# Patient Record
Sex: FEMALE | Race: WHITE | NOT HISPANIC OR LATINO | Employment: OTHER | URBAN - METROPOLITAN AREA
[De-identification: names, ages, dates, MRNs, and addresses within clinical notes are randomized per-mention and may not be internally consistent; named-entity substitution may affect disease eponyms.]

---

## 2017-01-05 ENCOUNTER — HOSPITAL ENCOUNTER (OUTPATIENT)
Facility: MEDICAL CENTER | Age: 62
End: 2017-01-05
Attending: PHYSICIAN ASSISTANT
Payer: COMMERCIAL

## 2017-01-05 PROCEDURE — 87624 HPV HI-RISK TYP POOLED RSLT: CPT

## 2017-01-05 PROCEDURE — 88175 CYTOPATH C/V AUTO FLUID REDO: CPT

## 2017-01-06 LAB
CYTOLOGY REG CYTOL: NORMAL
HPV HR 12 DNA CVX QL NAA+PROBE: NEGATIVE
HPV16 DNA SPEC QL NAA+PROBE: NEGATIVE
HPV18 DNA SPEC QL NAA+PROBE: NEGATIVE
SPECIMEN SOURCE: NORMAL

## 2017-02-08 ENCOUNTER — HOSPITAL ENCOUNTER (OUTPATIENT)
Dept: LAB | Facility: MEDICAL CENTER | Age: 62
End: 2017-02-08
Attending: INTERNAL MEDICINE
Payer: COMMERCIAL

## 2017-02-08 LAB
T4 FREE SERPL-MCNC: 0.8 NG/DL (ref 0.53–1.43)
TSH SERPL DL<=0.005 MIU/L-ACNC: 0.79 UIU/ML (ref 0.3–3.7)

## 2017-02-08 PROCEDURE — 36415 COLL VENOUS BLD VENIPUNCTURE: CPT

## 2017-02-08 PROCEDURE — 84439 ASSAY OF FREE THYROXINE: CPT

## 2017-02-08 PROCEDURE — 84443 ASSAY THYROID STIM HORMONE: CPT

## 2017-02-09 ENCOUNTER — HOSPITAL ENCOUNTER (OUTPATIENT)
Dept: RADIOLOGY | Facility: MEDICAL CENTER | Age: 62
End: 2017-02-09
Attending: FAMILY MEDICINE
Payer: COMMERCIAL

## 2017-02-09 DIAGNOSIS — N92.0 EXCESSIVE OR FREQUENT MENSTRUATION: ICD-10-CM

## 2017-02-09 DIAGNOSIS — N92.6 IRREGULAR MENSTRUAL CYCLE: ICD-10-CM

## 2017-02-09 PROCEDURE — 76830 TRANSVAGINAL US NON-OB: CPT

## 2017-02-21 ENCOUNTER — OFFICE VISIT (OUTPATIENT)
Dept: MEDICAL GROUP | Facility: PHYSICIAN GROUP | Age: 62
End: 2017-02-21
Payer: COMMERCIAL

## 2017-02-21 VITALS
HEART RATE: 74 BPM | BODY MASS INDEX: 40.97 KG/M2 | TEMPERATURE: 97.5 F | DIASTOLIC BLOOD PRESSURE: 82 MMHG | SYSTOLIC BLOOD PRESSURE: 130 MMHG | OXYGEN SATURATION: 98 % | HEIGHT: 64 IN | WEIGHT: 240 LBS

## 2017-02-21 DIAGNOSIS — F41.9 ANXIETY: ICD-10-CM

## 2017-02-21 DIAGNOSIS — I10 ESSENTIAL HYPERTENSION: ICD-10-CM

## 2017-02-21 DIAGNOSIS — E04.2 MULTIPLE THYROID NODULES: ICD-10-CM

## 2017-02-21 DIAGNOSIS — E66.01 MORBID OBESITY WITH BMI OF 40.0-44.9, ADULT (HCC): ICD-10-CM

## 2017-02-21 DIAGNOSIS — F40.243 FEAR OF FLYING: ICD-10-CM

## 2017-02-21 PROCEDURE — 99214 OFFICE O/P EST MOD 30 MIN: CPT | Performed by: FAMILY MEDICINE

## 2017-02-21 ASSESSMENT — PATIENT HEALTH QUESTIONNAIRE - PHQ9: CLINICAL INTERPRETATION OF PHQ2 SCORE: 0

## 2017-02-21 NOTE — ASSESSMENT & PLAN NOTE
Ongoing issue. Patient reports that she has a history of fear of flying and has utilized Xanax 0.25 mg in the past. She is not reporting any recent trips for which she will need the medication.

## 2017-02-21 NOTE — MR AVS SNAPSHOT
"        Kiley Foss   2017 11:40 AM   Office Visit   MRN: [id number]    Department:  [facility]   Dept Phone:  [phone]    Description:  Female : 1955   Provider:  Marysol Navarro D.O.           Reason for Visit     Follow-Up           Allergies as of 2017     Allergen Noted Reactions    Augmentin 2011       Feels anxious    Cephalexin 2015   Vomiting    Pcn [Penicillins] 2011       Sulfa Drugs 2011         You were diagnosed with     Fear of flying   [508126]       Anxiety   [028446]       Essential hypertension   [4954386]       Multiple thyroid nodules   [053043]       Morbid obesity with BMI of 40.0-44.9, adult (CMS-[facility])   [901477]         Vital Signs     Blood Pressure Pulse Temperature Height Weight Body Mass Index    130/82 mmHg 74 36.4 °C (97.5 °F) 1.626 m (5' 4.02\") 108.863 kg (240 lb) 41.18 kg/m2    Oxygen Saturation Smoking Status                98% Never Smoker           Basic Information     Date Of Birth Sex Race Ethnicity Preferred Language    1955 Female White Non- English      Your appointments     2017  2:30 PM   US MINGE with [facility]  2   [facility] ([address])    [address] [address] [phone]           Check in 30 minutes prior.            2017  3:00 PM   MA [id number] with [facility]  2   [facility] ([address])    [address] [address] [phone]           No deodorant, powder, perfume or lotion under the arm or breast area.            Aug 22, 2017 11:20 AM   Established Patient with Marysol Navarro D.O.   [facility] ([place])    [address] [place] [id number] [phone]           You will be receiving a confirmation call a few days before your appointment from our automated call confirmation system.              Problem List              ICD-10-CM Priority Class Noted - " Resolved    Anxiety F41.9   Unknown - Present    Hyperlipidemia E78.5   Unknown - Present    Non-seasonal allergic rhinitis J30.89   Unknown - Present    ASTHMA    Unknown - Present    GERD (gastroesophageal reflux disease) K21.9   Unknown - Present    Multiple thyroid nodules E04.2   Unknown - Present    RAD (reactive airway disease) (Chronic) J45.909   7/1/2011 - Present    Routine health maintenance Z00.00   7/1/2011 - Present    Essential hypertension I10   7/10/2015 - Present    Varicose veins of right lower extremity I83.91   7/10/2015 - Present    Obesity, Class III, BMI 40-49.9 (morbid obesity) (CMS-[facility]) E66.01   8/22/2016 - Present    Fear of flying F40.243   2/21/2017 - Present    Morbid obesity with BMI of 40.0-44.9, adult ([facility]) E66.01, Z68.41   2/21/2017 - Present      Health Maintenance        Date Due Completion Dates    IMM DTaP/Tdap/Td Vaccine (1 - Tdap) 10/22/1976 ---    IMM PNEUMOCOCCAL 19-64 (ADULT) MEDIUM RISK SERIES (1 of 1 - PPSV23) 10/22/1976 ---    IMM INFLUENZA (1) 9/1/2016 10/9/2015, 9/15/2014    MAMMOGRAM 2/12/2017 2/12/2016, 8/14/2014, 8/1/2014, 8/1/2014, 8/1/2014, 8/1/2014, 8/1/2014    PAP SMEAR 1/5/2020 1/5/2017, 8/14/2012    COLONOSCOPY 11/12/2020 11/12/2015            Current Immunizations     Influenza TIV (IM) 10/9/2015, 9/15/2014      Below and/or attached are the medications your provider expects you to take. Review all of your home medications and newly ordered medications with your provider and/or pharmacist. Follow medication instructions as directed by your provider and/or pharmacist. Please keep your medication list with you and share with your provider. Update the information when medications are discontinued, doses are changed, or new medications (including over-the-counter products) are added; and carry medication information at all times in the event of emergency situations     Allergies:  AUGMENTIN - (reactions not documented)     CEPHALEXIN - Vomiting     PCN -  (reactions not documented)     SULFA DRUGS - (reactions not documented)               Medications  Valid as of: February 21, 2017 - 12:09 PM    Generic Name Brand Name Tablet Size Instructions for use    Albuterol Sulfate (Aero Soln) albuterol 108 (90 BASE) MCG/ACT Inhale 2 Puffs by mouth every 6 hours as needed.        ALPRAZolam (Tab) XANAX 0.25 MG Take 1 Tab by mouth 1 time daily as needed for Anxiety (with flying). Indications: prn flying        Beclomethasone Dipropionate (Aero Soln) QVAR 40 MCG/ACT Inhale 2 Puffs by mouth 2 Times a Day.        Cetirizine HCl   Take  by mouth.        Cholecalciferol (Tab) cholecalciferol 1000 UNIT Take 1,000 Units by mouth every day.        Cyanocobalamin (Tab) VITAMIN B-12 100 MCG Take 100 mcg by mouth every day.        Fluticasone Propionate (Suspension) FLONASE 50 MCG/ACT SPRAY 2 SPRAYS IN EACH NOSTRILL EVERY DAY        HydroCHLOROthiazide (Cap) MICROZIDE 12.5 MG TAKE 1 CAPSULE BY MOUTH EVERY DAY        Multiple Vitamins-Minerals   Take  by mouth.        Norethindrone Acetate   Take  by mouth.        PredniSONE (Tab) DELTASONE 20 MG Take one tab PO qd x 5d        Progesterone Micronized   Take  by mouth.        Sertraline HCl (Tab) ZOLOFT 50 MG Take 1 Tab by mouth every day.        .                 Medicines prescribed today were sent to:     [facility] [address] [place] [address] - [address] AT [facility]. & [facility]    [address] [address]    Phone: [phone] Fax: [phone]    Open 24 Hours?: No      Medication refill instructions:       If your prescription bottle indicates you have medication refills left, it is not necessary to call your provider’s office. Please contact your pharmacy and they will refill your medication.    If your prescription bottle indicates you do not have any refills left, you may request refills at any time through one of the following ways: The online [employer] system (except Urgent Care), by calling  your provider’s office, or by asking your pharmacy to contact your provider’s office with a refill request. Medication refills are processed only during regular business hours and may not be available until the next business day. Your provider may request additional information or to have a follow-up visit with you prior to refilling your medication.   *Please Note: Medication refills are assigned a new Rx number when refilled electronically. Your pharmacy may indicate that no refills were authorized even though a new prescription for the same medication is available at the pharmacy. Please request the medicine by name with the pharmacy before contacting your provider for a refill.        Your To Do List     Future Labs/Procedures Complete By Expires    COMP METABOLIC PANEL  8/1/2017 2/22/2018    LIPID PROFILE  8/1/2017 2/22/2018    VITAMIN D,25 HYDROXY  8/1/2017 2/22/2018         "[employer]" Access Code: [email or web address]  Expires: 3/10/2017 10:44 AM    "[employer]"  A secure, online tool to manage your health information     [email or web address]’s "[employer]"® is a secure, online tool that connects you to your personalized health information from the privacy of your home -- day or night - making it very easy for you to manage your healthcare. Once the activation process is completed, you can even access your medical information using the "[employer]" delta, which is available for free in the Apple Delta store or Google Play store.     "[employer]" provides the following levels of access (as shown below):   My Chart Features   [facility] Doctor [facility]  Specialists [facility]  Urgent  Care Non-[facility]  Primary Care  Doctor   Email your healthcare team securely and privately 24/7 X X X    Manage appointments: schedule your next appointment; view details of past/upcoming appointments X      Request prescription refills. X      View recent personal medical records, including lab and immunizations X X X X   View health record, including health  history, allergies, medications X X X X   Read reports about your outpatient visits, procedures, consult and ER notes X X X X   See your discharge summary, which is a recap of your hospital and/or ER visit that includes your diagnosis, lab results, and care plan. X X       How to register for [employer]:  1. Go to  [email or web address].  2. Click on the Sign Up Now box, which takes you to the New Member Sign Up page. You will need to provide the following information:  a. Enter your [employer] Access Code exactly as it appears at the top of this page. (You will not need to use this code after you’ve completed the sign-up process. If you do not sign up before the expiration date, you must request a new code.)   b. Enter your date of birth.   c. Enter your home email address.   d. Click Submit, and follow the next screen’s instructions.  3. Create a [employer] ID. This will be your [employer] login ID and cannot be changed, so think of one that is secure and easy to remember.  4. Create a onkeat password. You can change your password at any time.  5. Enter your Password Reset Question and Answer. This can be used at a later time if you forget your password.   6. Enter your e-mail address. This allows you to receive e-mail notifications when new information is available in [employer].  7. Click Sign Up. You can now view your health information.    For assistance activating your [employer] account, call [phone]

## 2017-02-21 NOTE — PROGRESS NOTES
Subjective:   Kiley Foss is a 61 y.o. female here today for thyroid; obesity; HTN; anxiety    Multiple thyroid nodules  Ongoing issue. Patient reports that she is seeing her endocrinologist. They have evaluated and determined that she has no changes in her thyroid nodules. Chart review shows that her TSH and T4 both well within the normal range. Patient denies any issues such as difficulty swallowing, difficulty breathing, or temperature intolerance.    Morbid obesity with BMI of 40.0-44.9, adult (HCC)  Ongoing issue. Patient reports that over the last 3 months she is not doing very well with eating a healthy diet or getting any regular exercise. She reports that she will be following back up with [employer] and getting restarted on her exercise classes.    Fear of flying  Ongoing issue. Patient reports that she has a history of fear of flying and has utilized Xanax 0.25 mg in the past. She is not reporting any recent trips for which she will need the medication.    Essential hypertension  Ongoing issue. Patient reports 100% compliance with hydrochlorothiazide 12.5 mg daily. Patient denies any issues with changes in urinary frequency, headache, dizziness, chest pain. Chart review shows that her blood pressure and heart rate are within normal range.    Anxiety  Ongoing issue. Patient reports 100% compliance with Zoloft 50 mg daily. Patient states the stabilizes her mood and helps her to manage very well. She states that on one occasion she did have some acute issues of anxiety but this was directly related to her mother's death and going to the . Otherwise she does well and denies any suicidal or homicidal ideation.         Current medicines (including changes today)  Current Outpatient Prescriptions   Medication Sig Dispense Refill   • Progesterone Micronized (PROGESTERONE PO) Take  by mouth.     • NORETHINDRONE ACETATE PO Take  by mouth.     • cyanocobalamin (VITAMIN B-12) 100 MCG TABS Take 100  "mcg by mouth every day.     • vitamin D (CHOLECALCIFEROL) 1000 UNIT TABS Take 1,000 Units by mouth every day.     • sertraline (ZOLOFT) 50 MG TABS Take 1 Tab by mouth every day. 90 Tab 3   • Cetirizine HCl (ZYRTEC PO) Take  by mouth.     • Multiple Vitamins-Minerals (WOMENS MULTI VITAMIN & MINERAL PO) Take  by mouth.     • predniSONE (DELTASONE) 20 MG Tab Take one tab PO qd x 5d 5 Tab 0   • albuterol (PROVENTIL HFA) 108 (90 BASE) MCG/ACT Aero Soln inhalation aerosol Inhale 2 Puffs by mouth every 6 hours as needed. 20.1 g 6   • fluticasone (FLONASE) 50 MCG/ACT nasal spray SPRAY 2 SPRAYS IN EACH NOSTRILL EVERY DAY 1 Bottle 3   • hydrochlorothiazide (MICROZIDE) 12.5 MG capsule TAKE 1 CAPSULE BY MOUTH EVERY DAY 90 Cap 3   • alprazolam (XANAX) 0.25 MG TABS Take 1 Tab by mouth 1 time daily as needed for Anxiety (with flying). Indications: prn flying 10 Tab 0   • beclomethasone (QVAR) 40 MCG/ACT inhaler Inhale 2 Puffs by mouth 2 Times a Day. 3 Inhaler 3     No current facility-administered medications for this visit.     She  has a past medical history of Anxiety; Dyslipidemia; Non-seasonal allergic rhinitis; ASTHMA; Simple phobia; GERD (gastroesophageal reflux disease); Multiple thyroid nodules; RAD (reactive airway disease) (7/1/2011); Obesity -- 43 (7/27/2011); Hemorrhage; and Hemorrhoids (11/12/15). She also has no past medical history of Breast cancer (CMS-HCC).    ROS   No chest pain, no shortness of breath, no abdominal pain       Objective:     Blood pressure 130/82, pulse 74, temperature 36.4 °C (97.5 °F), height 1.626 m (5' 4.02\"), weight 108.863 kg (240 lb), SpO2 98 %. Body mass index is 41.18 kg/(m^2).   Physical Exam:  Alert, oriented in no acute distress.  Eye contact is good, speech goal directed, affect calm  HEENT: conjunctiva non-injected, sclera non-icteric.  Pinna normal. TM pearly gray.   Oral mucous membranes pink and moist with no lesions.  Neck No adenopathy or masses in the neck or supraclavicular " regions.  Lungs: clear to auscultation bilaterally with good excursion.  CV: regular rate and rhythm.  Abdomen: soft, nontender, No CVAT; obese  Ext: no edema, color normal, vascularity normal, temperature normal  Psych :normal affect and mood      Assessment and Plan:   The following treatment plan was discussed     1. Morbid obesity with BMI of 40.0-44.9, adult (CMS-[facility])  Patient identified as having weight management issue.  Appropriate orders and counseling given.    Uncontrolled. Encouraged patient to restart Weight Watchers and get back on her regular exercise program. Monitor   2. Essential hypertension  COMP METABOLIC PANEL    LIPID PROFILE    VITAMIN D,25 HYDROXY    Stable. Continue current medication; monitor   3. Multiple thyroid nodules      Stable. Continue follow-up with endocrinology. Monitor   4. Anxiety      Stable. Continue current medications; monitor   5. Fear of flying      Stable. If needed, would provide prescription Xanax 0.25 mg 5 tablets for travel. Monitor       Followup: Return in about 6 months (around 8/21/2017) for HTN/lipids/labs, Short.

## 2017-02-21 NOTE — ASSESSMENT & PLAN NOTE
Ongoing issue. Patient reports that she is seeing her endocrinologist. They have evaluated and determined that she has no changes in her thyroid nodules. Chart review shows that her TSH and T4 both well within the normal range. Patient denies any issues such as difficulty swallowing, difficulty breathing, or temperature intolerance.

## 2017-02-21 NOTE — ASSESSMENT & PLAN NOTE
Ongoing issue. Patient reports 100% compliance with Zoloft 50 mg daily. Patient states the stabilizes her mood and helps her to manage very well. She states that on one occasion she did have some acute issues of anxiety but this was directly related to her mother's death and going to the . Otherwise she does well and denies any suicidal or homicidal ideation.

## 2017-02-21 NOTE — ASSESSMENT & PLAN NOTE
Ongoing issue. Patient reports 100% compliance with hydrochlorothiazide 12.5 mg daily. Patient denies any issues with changes in urinary frequency, headache, dizziness, chest pain. Chart review shows that her blood pressure and heart rate are within normal range.

## 2017-02-21 NOTE — ASSESSMENT & PLAN NOTE
Ongoing issue. Patient reports that over the last 3 months she is not doing very well with eating a healthy diet or getting any regular exercise. She reports that she will be following back up with [employer] and getting restarted on her exercise classes.

## 2017-04-24 RX ORDER — ALBUTEROL SULFATE 2.5 MG/3ML
2.5 SOLUTION RESPIRATORY (INHALATION) EVERY 4 HOURS PRN
Qty: 75 ML | Refills: 0 | Status: SHIPPED | OUTPATIENT
Start: 2017-04-24 | End: 2017-09-19

## 2017-04-24 RX ORDER — ALBUTEROL SULFATE 2.5 MG/3ML
SOLUTION RESPIRATORY (INHALATION)
Qty: 75 ML | Refills: 0 | OUTPATIENT
Start: 2017-04-24

## 2017-04-25 ENCOUNTER — TELEPHONE (OUTPATIENT)
Dept: MEDICAL GROUP | Facility: PHYSICIAN GROUP | Age: 62
End: 2017-04-25

## 2017-04-25 ENCOUNTER — HOSPITAL ENCOUNTER (OUTPATIENT)
Dept: RADIOLOGY | Facility: MEDICAL CENTER | Age: 62
End: 2017-04-25
Attending: FAMILY MEDICINE
Payer: COMMERCIAL

## 2017-04-25 DIAGNOSIS — R92.8 ABNORMAL MAMMOGRAM OF LEFT BREAST: ICD-10-CM

## 2017-04-25 DIAGNOSIS — R92.30 DENSE BREAST TISSUE: ICD-10-CM

## 2017-04-25 DIAGNOSIS — Z13.9 SCREENING: ICD-10-CM

## 2017-04-25 PROCEDURE — 76641 ULTRASOUND BREAST COMPLETE: CPT

## 2017-04-25 PROCEDURE — 77063 BREAST TOMOSYNTHESIS BI: CPT

## 2017-04-25 NOTE — TELEPHONE ENCOUNTER
----- Message from Marysol Navarro D.O. sent at 4/25/2017 12:43 PM PDT -----  Please advise pt that the mammogram was normal but the ultrasound did show a small area in the left breast which needs further evaluation. I have ordered those tests; please give patient phone number for scheduling.    Marysol Navarro D.O.

## 2017-05-31 ENCOUNTER — HOSPITAL ENCOUNTER (OUTPATIENT)
Dept: RADIOLOGY | Facility: MEDICAL CENTER | Age: 62
End: 2017-05-31
Attending: FAMILY MEDICINE
Payer: COMMERCIAL

## 2017-05-31 DIAGNOSIS — R92.8 ABNORMAL MAMMOGRAM OF LEFT BREAST: ICD-10-CM

## 2017-05-31 PROCEDURE — 76642 ULTRASOUND BREAST LIMITED: CPT | Mod: LT

## 2017-06-01 ENCOUNTER — TELEPHONE (OUTPATIENT)
Dept: MEDICAL GROUP | Facility: PHYSICIAN GROUP | Age: 62
End: 2017-06-01

## 2017-06-01 NOTE — TELEPHONE ENCOUNTER
----- Message from Marysol Navarro D.O. sent at 6/1/2017  9:24 AM PDT -----  Please advise pt that the breast ultrasound of the concerning area was normal. If you have any further questions, please let me know    Marysol Navarro D.O.

## 2017-09-13 ENCOUNTER — HOSPITAL ENCOUNTER (OUTPATIENT)
Dept: LAB | Facility: MEDICAL CENTER | Age: 62
End: 2017-09-13
Attending: FAMILY MEDICINE
Payer: COMMERCIAL

## 2017-09-13 DIAGNOSIS — I10 ESSENTIAL HYPERTENSION: ICD-10-CM

## 2017-09-13 LAB
25(OH)D3 SERPL-MCNC: 31 NG/ML (ref 30–100)
ALBUMIN SERPL BCP-MCNC: 4.1 G/DL (ref 3.2–4.9)
ALBUMIN/GLOB SERPL: 1.4 G/DL
ALP SERPL-CCNC: 64 U/L (ref 30–99)
ALT SERPL-CCNC: 19 U/L (ref 2–50)
ANION GAP SERPL CALC-SCNC: 9 MMOL/L (ref 0–11.9)
AST SERPL-CCNC: 16 U/L (ref 12–45)
BILIRUB SERPL-MCNC: 2.2 MG/DL (ref 0.1–1.5)
BUN SERPL-MCNC: 17 MG/DL (ref 8–22)
CALCIUM SERPL-MCNC: 9.6 MG/DL (ref 8.5–10.5)
CHLORIDE SERPL-SCNC: 105 MMOL/L (ref 96–112)
CHOLEST SERPL-MCNC: 171 MG/DL (ref 100–199)
CO2 SERPL-SCNC: 27 MMOL/L (ref 20–33)
CREAT SERPL-MCNC: 0.85 MG/DL (ref 0.5–1.4)
GFR SERPL CREATININE-BSD FRML MDRD: >60 ML/MIN/1.73 M 2
GLOBULIN SER CALC-MCNC: 2.9 G/DL (ref 1.9–3.5)
GLUCOSE SERPL-MCNC: 77 MG/DL (ref 65–99)
HDLC SERPL-MCNC: 52 MG/DL
LDLC SERPL CALC-MCNC: 103 MG/DL
POTASSIUM SERPL-SCNC: 3.7 MMOL/L (ref 3.6–5.5)
PROT SERPL-MCNC: 7 G/DL (ref 6–8.2)
SODIUM SERPL-SCNC: 141 MMOL/L (ref 135–145)
TRIGL SERPL-MCNC: 82 MG/DL (ref 0–149)

## 2017-09-13 PROCEDURE — 80053 COMPREHEN METABOLIC PANEL: CPT

## 2017-09-13 PROCEDURE — 82306 VITAMIN D 25 HYDROXY: CPT

## 2017-09-13 PROCEDURE — 36415 COLL VENOUS BLD VENIPUNCTURE: CPT

## 2017-09-13 PROCEDURE — 80061 LIPID PANEL: CPT

## 2017-09-19 ENCOUNTER — OFFICE VISIT (OUTPATIENT)
Dept: MEDICAL GROUP | Facility: PHYSICIAN GROUP | Age: 62
End: 2017-09-19
Payer: COMMERCIAL

## 2017-09-19 VITALS
HEART RATE: 80 BPM | OXYGEN SATURATION: 93 % | BODY MASS INDEX: 43.87 KG/M2 | DIASTOLIC BLOOD PRESSURE: 78 MMHG | WEIGHT: 257 LBS | RESPIRATION RATE: 16 BRPM | TEMPERATURE: 97.2 F | HEIGHT: 64 IN | SYSTOLIC BLOOD PRESSURE: 148 MMHG

## 2017-09-19 DIAGNOSIS — J30.1 NON-SEASONAL ALLERGIC RHINITIS DUE TO POLLEN: ICD-10-CM

## 2017-09-19 DIAGNOSIS — I10 ESSENTIAL HYPERTENSION: ICD-10-CM

## 2017-09-19 DIAGNOSIS — F13.10 SEDATIVE, HYPNOTIC, OR ANXIOLYTIC ABUSE, DAILY USE (HCC): ICD-10-CM

## 2017-09-19 DIAGNOSIS — E78.00 PURE HYPERCHOLESTEROLEMIA: ICD-10-CM

## 2017-09-19 DIAGNOSIS — F41.9 ANXIETY: ICD-10-CM

## 2017-09-19 DIAGNOSIS — E66.01 MORBID OBESITY WITH BMI OF 40.0-44.9, ADULT (HCC): ICD-10-CM

## 2017-09-19 DIAGNOSIS — Z23 NEED FOR INFLUENZA VACCINATION: ICD-10-CM

## 2017-09-19 DIAGNOSIS — R79.89 LOW SERUM VITAMIN D: ICD-10-CM

## 2017-09-19 PROCEDURE — 99214 OFFICE O/P EST MOD 30 MIN: CPT | Mod: 25 | Performed by: FAMILY MEDICINE

## 2017-09-19 PROCEDURE — 90686 IIV4 VACC NO PRSV 0.5 ML IM: CPT | Performed by: FAMILY MEDICINE

## 2017-09-19 PROCEDURE — 90471 IMMUNIZATION ADMIN: CPT | Performed by: FAMILY MEDICINE

## 2017-09-19 RX ORDER — METRONIDAZOLE 500 MG/1
500 TABLET ORAL 3 TIMES DAILY
COMMUNITY
End: 2018-05-30

## 2017-09-19 RX ORDER — FLUCONAZOLE 150 MG/1
150 TABLET ORAL DAILY
COMMUNITY
End: 2017-12-19

## 2017-09-19 NOTE — ASSESSMENT & PLAN NOTE
Ongoing issue. Chart review shows recent lab work with a vitamin D level of 32. This is [facility] recommended range of 40. Patient currently takes 1000 international units of vitamin D daily

## 2017-09-19 NOTE — ASSESSMENT & PLAN NOTE
Ongoing issue. Patient reports that she is trying to eat healthy but is not getting regular daily exercise. She also has had multiple rounds of steroids due to her sinus infections.

## 2017-09-19 NOTE — ASSESSMENT & PLAN NOTE
Ongoing issue. Patient reports the last several months she has had issues with her allergies including recurrent sinus infections. She currently is taking antibiotics along with Zyrtec and Flonase. She states that the Flonase is now only mildly beneficial. She continues to have issues with sinus pressure and congestion; ear pressure; runny nose.

## 2017-09-19 NOTE — ASSESSMENT & PLAN NOTE
Ongoing issue. Patient reports that she utilizes Xanax 0.25 mg approximately once a month to help with anxiety that is situational in nature. Patient denies any issues of memory loss, grogginess and she utilizes medication.    Have reviewed risk of medication including but not limited to short term memory loss, grogginess, fatigue; patient instructed that she cannot drive all taking medication. Patient will be signing a controlled substance agreement and urine collected for urine drug screen

## 2017-09-19 NOTE — ASSESSMENT & PLAN NOTE
Ongoing issue. Patient reports 100% compliance with hydrochlorothiazide 12.5 mg daily. She denies any issues with headache, dizziness, chest pain. Chart review shows that her blood pressure is mildly elevated today.

## 2017-09-19 NOTE — PROGRESS NOTES
Subjective:   Kiley Foss is a 61 y.o. female here today for Allergies, obesity, lab review    Sedative, hypnotic, or anxiolytic abuse, daily use  Ongoing issue. Patient reports that she utilizes Xanax 0.25 mg approximately once a month to help with anxiety that is situational in nature. Patient denies any issues of memory loss, grogginess and she utilizes medication.    Have reviewed risk of medication including but not limited to short term memory loss, grogginess, fatigue; patient instructed that she cannot drive all taking medication. Patient will be signing a controlled substance agreement and urine collected for urine drug screen    Non-seasonal allergic rhinitis  Ongoing issue. Patient reports the last several months she has had issues with her allergies including recurrent sinus infections. She currently is taking antibiotics along with Zyrtec and Flonase. She states that the Flonase is now only mildly beneficial. She continues to have issues with sinus pressure and congestion; ear pressure; runny nose.    Morbid obesity with BMI of 40.0-44.9, adult (HCC)  Ongoing issue. Patient reports that she is trying to eat healthy but is not getting regular daily exercise. She also has had multiple rounds of steroids due to her sinus infections.    Low serum vitamin D  Ongoing issue. Chart review shows recent lab work with a vitamin D level of 32. This is [facility] recommended range of 40. Patient currently takes 1000 international units of vitamin D daily    Hyperlipidemia  Ongoing issue. Recent lab work shows significant improvement in her lipid panel. This could be a result of her recent diet changes.    Essential hypertension  Ongoing issue. Patient reports 100% compliance with hydrochlorothiazide 12.5 mg daily. She denies any issues with headache, dizziness, chest pain. Chart review shows that her blood pressure is mildly elevated today.    Anxiety  Ongoing issue. Patient reports 100% compliance with Zoloft 50  mg daily. Patient reports that this does help to stabilize her mood. She denies any issues with anger outbursts, physiologic symptoms such as chest pain or shortness of breath.         Current medicines (including changes today)  Current Outpatient Prescriptions   Medication Sig Dispense Refill   • fluconazole (DIFLUCAN) 150 MG tablet Take 150 mg by mouth every day.     • metronidazole (FLAGYL) 500 MG Tab Take 500 mg by mouth 3 times a day.     • fluticasone (FLONASE) 50 MCG/ACT nasal spray SPRAY 2 SPRAYS IN EACH NOSTRILL EVERY DAY 16 g 6   • hydrochlorothiazide (MICROZIDE) 12.5 MG capsule TAKE 1 CAPSULE BY MOUTH EVERY DAY 90 Cap 3   • cyanocobalamin (VITAMIN B-12) 100 MCG TABS Take 100 mcg by mouth every day.     • vitamin D (CHOLECALCIFEROL) 1000 UNIT TABS Take 1,000 Units by mouth every day.     • sertraline (ZOLOFT) 50 MG TABS Take 1 Tab by mouth every day. 90 Tab 3   • Cetirizine HCl (ZYRTEC PO) Take  by mouth.     • Multiple Vitamins-Minerals (WOMENS MULTI VITAMIN & MINERAL PO) Take  by mouth.     • Progesterone Micronized (PROGESTERONE PO) Take  by mouth.     • albuterol (PROVENTIL HFA) 108 (90 BASE) MCG/ACT Aero Soln inhalation aerosol Inhale 2 Puffs by mouth every 6 hours as needed. 20.1 g 6   • NORETHINDRONE ACETATE PO Take  by mouth.     • alprazolam (XANAX) 0.25 MG TABS Take 1 Tab by mouth 1 time daily as needed for Anxiety (with flying). Indications: prn flying 10 Tab 0   • beclomethasone (QVAR) 40 MCG/ACT inhaler Inhale 2 Puffs by mouth 2 Times a Day. 3 Inhaler 3     No current facility-administered medications for this visit.      She  has a past medical history of Anxiety; ASTHMA; Dyslipidemia; GERD (gastroesophageal reflux disease); Hemorrhage; Hemorrhoids (11/12/15); Multiple thyroid nodules; Non-seasonal allergic rhinitis; Obesity -- 43 (7/27/2011); RAD (reactive airway disease) (7/1/2011); and Simple phobia. She also has no past medical history of Breast cancer (CMS-[facility]).    ROS   No chest pain,  "no shortness of breath, no abdominal pain  +Sinus pressure and congestion     Objective:     Blood pressure 148/78, pulse 80, temperature 36.2 °C (97.2 °F), resp. rate 16, height 1.626 m (5' 4\"), weight 116.6 kg (257 lb), SpO2 93 %. Body mass index is 44.11 kg/m².   Physical Exam:  Alert, oriented in no acute distress.  Eye contact is good, speech goal directed, affect calm  HEENT: conjunctiva non-injected, sclera non-icteric.  Pinna normal. Noted serous drainage behind both tympanic membranes  Oral mucous membranes pink and moist with no lesions.  Neck No adenopathy or masses in the neck or supraclavicular regions.  Lungs: clear to auscultation bilaterally with good excursion.  CV: regular rate and rhythm.  Abdomen: soft, nontender, No CVAT ; obese  Ext: no edema, color normal, vascularity normal, temperature normal        Assessment and Plan:   The following treatment plan was discussed     1. Low serum vitamin D      Uncontrolled. Recommend vitamin D 2000 international units twice daily. Monitor   2. Pure hypercholesterolemia      Stable. Monitor   3. Essential hypertension      Stable. Continue current medications; monitor   4. Morbid obesity with BMI of 40.0-44.9, adult (HCC)      Uncontrolled. Encourage patient to start getting some regular daily exercise. Monitor   5. Non-seasonal allergic rhinitis due to pollen      Uncontrolled. Recommend patient change Flonase to Nasonex; monitor   6. Anxiety      Stable. Continue current medications; monitor   7. Sedative, hypnotic, or anxiolytic abuse, daily use  [facility] SCREEN    CONTROLLED SUBSTANCE TREATMENT AGREEMENT    Stable. Controlled substance agreement signed; urine collected for urine drug screen; with results, prescription for 10 pills will be provided   8. Need for influenza vaccination  Flu Quad Inj >3 Year Pre-Filled PF    Age appropriate immunization provided; patient tolerated procedure well.       Followup: Return in about 6 months " (around 3/19/2018) for medication review, Short.

## 2017-09-19 NOTE — ASSESSMENT & PLAN NOTE
Ongoing issue. Recent lab work shows significant improvement in her lipid panel. This could be a result of her recent diet changes.

## 2017-09-19 NOTE — ASSESSMENT & PLAN NOTE
Ongoing issue. Patient reports 100% compliance with Zoloft 50 mg daily. Patient reports that this does help to stabilize her mood. She denies any issues with anger outbursts, physiologic symptoms such as chest pain or shortness of breath.

## 2017-09-22 ENCOUNTER — HOSPITAL ENCOUNTER (OUTPATIENT)
Dept: LAB | Facility: MEDICAL CENTER | Age: 62
End: 2017-09-22
Attending: SPECIALIST
Payer: COMMERCIAL

## 2017-09-22 DIAGNOSIS — F40.298 SIMPLE PHOBIA: ICD-10-CM

## 2017-09-22 DIAGNOSIS — F41.9 ANXIETY: ICD-10-CM

## 2017-09-22 PROCEDURE — 87086 URINE CULTURE/COLONY COUNT: CPT

## 2017-09-22 RX ORDER — ALPRAZOLAM 0.25 MG/1
0.25 TABLET ORAL
Qty: 10 TAB | Refills: 0 | Status: SHIPPED
Start: 2017-09-22 | End: 2019-01-28 | Stop reason: SDUPTHER

## 2017-09-24 LAB
BACTERIA UR CULT: NORMAL
SIGNIFICANT IND 70042: NORMAL
SITE SITE: NORMAL
SOURCE SOURCE: NORMAL

## 2017-11-13 DIAGNOSIS — I10 ESSENTIAL HYPERTENSION: ICD-10-CM

## 2017-11-14 RX ORDER — HYDROCHLOROTHIAZIDE 12.5 MG/1
CAPSULE, GELATIN COATED ORAL
Qty: 90 CAP | Refills: 3 | Status: SHIPPED | OUTPATIENT
Start: 2017-11-14 | End: 2018-05-30

## 2017-12-15 ENCOUNTER — OFFICE VISIT (OUTPATIENT)
Dept: MEDICAL GROUP | Facility: PHYSICIAN GROUP | Age: 62
End: 2017-12-15
Payer: COMMERCIAL

## 2017-12-15 ENCOUNTER — HOSPITAL ENCOUNTER (OUTPATIENT)
Facility: MEDICAL CENTER | Age: 62
End: 2017-12-15
Attending: NURSE PRACTITIONER
Payer: COMMERCIAL

## 2017-12-15 VITALS
HEIGHT: 64 IN | HEART RATE: 89 BPM | BODY MASS INDEX: 43.71 KG/M2 | TEMPERATURE: 98.1 F | OXYGEN SATURATION: 97 % | RESPIRATION RATE: 12 BRPM | SYSTOLIC BLOOD PRESSURE: 110 MMHG | WEIGHT: 256 LBS | DIASTOLIC BLOOD PRESSURE: 78 MMHG

## 2017-12-15 DIAGNOSIS — N30.01 ACUTE CYSTITIS WITH HEMATURIA: ICD-10-CM

## 2017-12-15 LAB
APPEARANCE UR: ABNORMAL
BILIRUB UR STRIP-MCNC: ABNORMAL MG/DL
COLOR UR AUTO: ABNORMAL
GLUCOSE UR STRIP.AUTO-MCNC: ABNORMAL MG/DL
KETONES UR STRIP.AUTO-MCNC: ABNORMAL MG/DL
LEUKOCYTE ESTERASE UR QL STRIP.AUTO: ABNORMAL
NITRITE UR QL STRIP.AUTO: ABNORMAL
PH UR STRIP.AUTO: 6 [PH] (ref 5–8)
PROT UR QL STRIP: POSITIVE MG/DL
RBC UR QL AUTO: ABNORMAL
SP GR UR STRIP.AUTO: 1.01
UROBILINOGEN UR STRIP-MCNC: 0.2 MG/DL

## 2017-12-15 PROCEDURE — 87086 URINE CULTURE/COLONY COUNT: CPT

## 2017-12-15 PROCEDURE — 99213 OFFICE O/P EST LOW 20 MIN: CPT | Performed by: NURSE PRACTITIONER

## 2017-12-15 PROCEDURE — 81002 URINALYSIS NONAUTO W/O SCOPE: CPT | Performed by: NURSE PRACTITIONER

## 2017-12-15 PROCEDURE — 87077 CULTURE AEROBIC IDENTIFY: CPT

## 2017-12-15 PROCEDURE — 87186 SC STD MICRODIL/AGAR DIL: CPT

## 2017-12-15 RX ORDER — OXYCODONE HYDROCHLORIDE 5 MG/1
TABLET ORAL
Refills: 0 | COMMUNITY
Start: 2017-11-16 | End: 2018-05-30

## 2017-12-15 RX ORDER — DOXYCYCLINE HYCLATE 100 MG/1
CAPSULE ORAL
COMMUNITY
Start: 2017-10-23 | End: 2017-12-15

## 2017-12-15 RX ORDER — NITROFURANTOIN 25; 75 MG/1; MG/1
100 CAPSULE ORAL 2 TIMES DAILY
Qty: 10 CAP | Refills: 0 | Status: SHIPPED | OUTPATIENT
Start: 2017-12-15 | End: 2018-05-30

## 2017-12-15 RX ORDER — PHENAZOPYRIDINE HYDROCHLORIDE 200 MG/1
200 TABLET, FILM COATED ORAL 3 TIMES DAILY PRN
Qty: 6 TAB | Refills: 0 | Status: SHIPPED | OUTPATIENT
Start: 2017-12-15 | End: 2018-05-30

## 2017-12-15 RX ORDER — CLINDAMYCIN HYDROCHLORIDE 300 MG/1
CAPSULE ORAL
COMMUNITY
Start: 2017-10-31 | End: 2017-12-15

## 2017-12-15 RX ORDER — NITROFURANTOIN 25; 75 MG/1; MG/1
CAPSULE ORAL
COMMUNITY
Start: 2017-09-22 | End: 2017-12-15 | Stop reason: SDUPTHER

## 2017-12-16 NOTE — PROGRESS NOTES
Subjective:     Chief Complaint   Patient presents with   • Dysuria       HPI  Kiley Foss is a 62 y.o. female here today for UTI symptoms. Symptoms started a few days ago with abnormal colored urine. She drank a lot of water and this cleared up, but odor persisted. Now today it looks like she is just urinating blood and there are clots. She has urgency, frequency, and dysuria. Denies any F/C or flank pain. No fatigue, malaise, or lethargy. No diarrhea or abd pain. Last had UTI September this year    The encounter diagnosis was Acute cystitis with hematuria.    Allergies: Augmentin; Cephalexin; Pcn [penicillins]; and Sulfa drugs  Current medicines (including changes today)  Current Outpatient Prescriptions   Medication Sig Dispense Refill   • phenazopyridine (PYRIDIUM) 200 MG Tab Take 1 Tab by mouth 3 times a day as needed. 6 Tab 0   • nitrofurantoin monohydr macro (MACROBID) 100 MG Cap Take 1 Cap by mouth 2 times a day. 10 Cap 0   • norethindrone (AYGESTIN) 5 MG tablet TK 1 T PO D FOR THE FIRST 12 DAYS OF THE MONTH  11   • oxycodone immediate-release (ROXICODONE) 5 MG Tab TK 1-2 TS PO Q 4-6 HOURS PRN  0   • hydrochlorothiazide (MICROZIDE) 12.5 MG capsule TAKE 1 CAPSULE BY MOUTH EVERY DAY 90 Cap 3   • alprazolam (XANAX) 0.25 MG Tab Take 1 Tab by mouth 1 time daily as needed for Anxiety (with flying). 10 Tab 0   • fluconazole (DIFLUCAN) 150 MG tablet Take 150 mg by mouth every day.     • metronidazole (FLAGYL) 500 MG Tab Take 500 mg by mouth 3 times a day.     • fluticasone (FLONASE) 50 MCG/ACT nasal spray SPRAY 2 SPRAYS IN EACH NOSTRILL EVERY DAY 16 g 6   • Progesterone Micronized (PROGESTERONE PO) Take  by mouth.     • albuterol (PROVENTIL HFA) 108 (90 BASE) MCG/ACT Aero Soln inhalation aerosol Inhale 2 Puffs by mouth every 6 hours as needed. 20.1 g 6   • NORETHINDRONE ACETATE PO Take  by mouth.     • cyanocobalamin (VITAMIN B-12) 100 MCG TABS Take 100 mcg by mouth every day.     • vitamin D (CHOLECALCIFEROL)  "1000 UNIT TABS Take 1,000 Units by mouth every day.     • sertraline (ZOLOFT) 50 MG TABS Take 1 Tab by mouth every day. 90 Tab 3   • beclomethasone (QVAR) 40 MCG/ACT inhaler Inhale 2 Puffs by mouth 2 Times a Day. 3 Inhaler 3   • Cetirizine HCl (ZYRTEC PO) Take  by mouth.     • Multiple Vitamins-Minerals (WOMENS MULTI VITAMIN & MINERAL PO) Take  by mouth.       No current facility-administered medications for this visit.        She  has a past medical history of Anxiety; ASTHMA; Dyslipidemia; GERD (gastroesophageal reflux disease); Hemorrhage; Hemorrhoids (11/12/15); Multiple thyroid nodules; Non-seasonal allergic rhinitis; Obesity -- 45 (7/27/2011); RAD (reactive airway disease) (7/1/2011); and Simple phobia. She also has no past medical history of Breast cancer (CMS-HCC).        ROS  As stated in HPI     Objective:     Blood pressure 110/78, pulse 89, temperature 36.7 °C (98.1 °F), resp. rate 12, height 1.626 m (5' 4\"), weight 116.1 kg (256 lb), SpO2 97 %. Body mass index is 43.94 kg/m².  Physical Exam:  General: Alert, oriented, in no acute distress.  Eye contact is good, speech goal directed, affect calm  CNs grossly intact.  Gross hearing intact.  Abdomen: No CVAT  Gait steady.     Results for NIMO PINON (MRN [id number]) as of 12/15/2017 16:28   Ref. Range 12/15/2017 16:26   POC Color Latest Ref Range: Negative  kong   POC Appearance Latest Ref Range: Negative  cloudy/turbid   POC Specific Gravity Latest Ref Range: <1.005 - >1.030  1.015   POC Urine PH Latest Ref Range: 5.0 - 8.0  6.0   POC Glucose Latest Ref Range: Negative mg/dL neg   POC Ketones Latest Ref Range: Negative mg/dL neg   POC Protein Latest Ref Range: Negative mg/dL positive   POC Nitrites Latest Ref Range: Negative  neg   POC Leukocyte Esterase Latest Ref Range: Negative  large   POC Blood Latest Ref Range: Negative  large   POC Biliruben Latest Ref Range: Negative mg/dL neg   POC Urobiligen Latest Ref Range: Negative (0.2) mg/dL 0.2 "       Assessment and Plan:   Assessment/Plan:  1. Acute cystitis with hematuria  Start nitrofurantoin 100 mg BID x5 days and Pyridium twice daily for 6 days. Enc fluids. Due to recent multiple antibiotic use and UTI 3 months ago, we will culture urine today   - POCT Urinalysis  - URINE CULTURE       Follow up:   Return if symptoms worsen or fail to improve.    Educated in proper administration of medication(s) ordered today including safety, possible SE, risks, benefits, rationale and alternatives to therapy.   Supportive care, differential diagnoses, and indications for immediate follow-up discussed with patient.    Pathogenesis of diagnosis discussed including typical length and natural progression.    Instructed to return to clinic or nearest emergency department for any change in condition, further concerns, or worsening of symptoms.  Patient states understanding of the plan of care and discharge instructions.      Please note that this dictation was created using voice recognition software. I have made every reasonable attempt to correct obvious errors, but I expect that there are errors of grammar and possibly content that I did not discover before finalizing the note.    Followup: Return if symptoms worsen or fail to improve. sooner should new symptoms or problems arise.

## 2017-12-18 LAB
BACTERIA UR CULT: ABNORMAL
BACTERIA UR CULT: ABNORMAL
SIGNIFICANT IND 70042: ABNORMAL
SITE SITE: ABNORMAL
SOURCE SOURCE: ABNORMAL

## 2017-12-19 ENCOUNTER — TELEPHONE (OUTPATIENT)
Dept: MEDICAL GROUP | Facility: PHYSICIAN GROUP | Age: 62
End: 2017-12-19

## 2017-12-19 RX ORDER — FLUCONAZOLE 150 MG/1
150 TABLET ORAL DAILY
Qty: 1 TAB | Refills: 0 | Status: SHIPPED | OUTPATIENT
Start: 2017-12-19 | End: 2018-05-30

## 2017-12-20 NOTE — TELEPHONE ENCOUNTER
----- Message from Rafaela Doan, Med Ass't sent at 12/19/2017 11:52 AM PST -----  Pt. Notified.  She is now having vaginal burning.  Maybe now has a yeast inf?  Please advise.  She has taken diflucan in the past at one and then another one a couple of days later.

## 2017-12-23 ENCOUNTER — OFFICE VISIT (OUTPATIENT)
Dept: URGENT CARE | Facility: PHYSICIAN GROUP | Age: 62
End: 2017-12-23
Payer: COMMERCIAL

## 2017-12-23 VITALS
OXYGEN SATURATION: 96 % | SYSTOLIC BLOOD PRESSURE: 142 MMHG | DIASTOLIC BLOOD PRESSURE: 92 MMHG | BODY MASS INDEX: 43.71 KG/M2 | TEMPERATURE: 97.5 F | WEIGHT: 256 LBS | RESPIRATION RATE: 16 BRPM | HEART RATE: 88 BPM | HEIGHT: 64 IN

## 2017-12-23 DIAGNOSIS — J20.9 ACUTE BRONCHITIS, UNSPECIFIED ORGANISM: ICD-10-CM

## 2017-12-23 PROCEDURE — 99214 OFFICE O/P EST MOD 30 MIN: CPT | Performed by: NURSE PRACTITIONER

## 2017-12-23 RX ORDER — FLUTICASONE PROPIONATE 110 UG/1
2 AEROSOL, METERED RESPIRATORY (INHALATION) 2 TIMES DAILY
Qty: 1 INHALER | Refills: 0 | Status: SHIPPED | OUTPATIENT
Start: 2017-12-23 | End: 2018-05-30

## 2017-12-23 RX ORDER — PREDNISONE 20 MG/1
TABLET ORAL
Qty: 10 TAB | Refills: 0 | Status: SHIPPED | OUTPATIENT
Start: 2017-12-23 | End: 2018-05-30

## 2017-12-24 NOTE — PROGRESS NOTES
Subjective:      Kiley Foss is a 62 y.o. female who presents with Cough (x 2 months)            HPI New problem. 62 year old female with cough for 2 months. She has chronic sinus issues as well as RAD. She denies fever, chills,myalgia, shortness of breath or wheezing. She has been on numerous antibiotics and would prefer not to treat. Was seen on 12/12 and had steroids and negative chest film.  Taking tessalon, flonase and zyrtec.  Augmentin; Cephalexin; Pcn [penicillins]; and Sulfa drugs  Current Outpatient Prescriptions on File Prior to Visit   Medication Sig Dispense Refill   • fluconazole (DIFLUCAN) 150 MG tablet Take 1 Tab by mouth every day. 1 Tab 0   • norethindrone (AYGESTIN) 5 MG tablet TK 1 T PO D FOR THE FIRST 12 DAYS OF THE MONTH  11   • oxycodone immediate-release (ROXICODONE) 5 MG Tab TK 1-2 TS PO Q 4-6 HOURS PRN  0   • phenazopyridine (PYRIDIUM) 200 MG Tab Take 1 Tab by mouth 3 times a day as needed. 6 Tab 0   • nitrofurantoin monohydr macro (MACROBID) 100 MG Cap Take 1 Cap by mouth 2 times a day. 10 Cap 0   • hydrochlorothiazide (MICROZIDE) 12.5 MG capsule TAKE 1 CAPSULE BY MOUTH EVERY DAY 90 Cap 3   • alprazolam (XANAX) 0.25 MG Tab Take 1 Tab by mouth 1 time daily as needed for Anxiety (with flying). 10 Tab 0   • metronidazole (FLAGYL) 500 MG Tab Take 500 mg by mouth 3 times a day.     • fluticasone (FLONASE) 50 MCG/ACT nasal spray SPRAY 2 SPRAYS IN EACH NOSTRILL EVERY DAY 16 g 6   • Progesterone Micronized (PROGESTERONE PO) Take  by mouth.     • albuterol (PROVENTIL HFA) 108 (90 BASE) MCG/ACT Aero Soln inhalation aerosol Inhale 2 Puffs by mouth every 6 hours as needed. 20.1 g 6   • NORETHINDRONE ACETATE PO Take  by mouth.     • cyanocobalamin (VITAMIN B-12) 100 MCG TABS Take 100 mcg by mouth every day.     • vitamin D (CHOLECALCIFEROL) 1000 UNIT TABS Take 1,000 Units by mouth every day.     • sertraline (ZOLOFT) 50 MG TABS Take 1 Tab by mouth every day. 90 Tab 3   • beclomethasone (QVAR) 40  "MCG/ACT inhaler Inhale 2 Puffs by mouth 2 Times a Day. 3 Inhaler 3   • Cetirizine HCl (ZYRTEC PO) Take  by mouth.     • Multiple Vitamins-Minerals (WOMENS MULTI VITAMIN & MINERAL PO) Take  by mouth.       No current facility-administered medications on file prior to visit.      Social History     Social History   • Marital status:      Spouse name: N/A   • Number of children: 2S   • Years of education: 1y college     Occupational History   • Retired Other     Social History Main Topics   • Smoking status: Never Smoker   • Smokeless tobacco: Never Used   • Alcohol use Yes      Comment: twice a month   • Drug use: No   • Sexual activity: Yes     Partners: Male     Other Topics Concern   • Not on file     Social History Narrative   • No narrative on file     family history includes Cancer in her brother, father, and sister; Diabetes in her mother; Heart Disease in her maternal grandfather and paternal grandfather; Hypertension in her mother; Stroke in her maternal grandfather and paternal grandfather.    ROS       Objective:     /92   Pulse 88   Temp 36.4 °C (97.5 °F)   Resp 16   Ht 1.626 m (5' 4\")   Wt 116.1 kg (256 lb)   SpO2 96%   BMI 43.94 kg/m²      Physical Exam   Constitutional: She is oriented to person, place, and time. She appears well-developed and well-nourished. No distress.   HENT:   Head: Normocephalic and atraumatic.   Right Ear: External ear and ear canal normal. Tympanic membrane is not injected and not perforated. No middle ear effusion.   Left Ear: External ear and ear canal normal. Tympanic membrane is not injected and not perforated.  No middle ear effusion.   Nose: Mucosal edema present.   Mouth/Throat: Posterior oropharyngeal erythema present. No oropharyngeal exudate.   Eyes: Conjunctivae are normal. Right eye exhibits no discharge. Left eye exhibits no discharge.   Neck: Normal range of motion. Neck supple.   Cardiovascular: Normal rate, regular rhythm and normal heart " sounds.    No murmur heard.  Pulmonary/Chest: Effort normal and breath sounds normal. No respiratory distress. She has no wheezes. She has no rales.   Musculoskeletal: Normal range of motion.   Normal movement of all 4 extremities.   Lymphadenopathy:     She has no cervical adenopathy.        Right: No supraclavicular adenopathy present.        Left: No supraclavicular adenopathy present.   Neurological: She is alert and oriented to person, place, and time. Gait normal.   Skin: Skin is warm and dry.   Psychiatric: She has a normal mood and affect. Her behavior is normal. Thought content normal.   Nursing note and vitals reviewed.              Assessment/Plan:     1. Acute bronchitis, unspecified organism  predniSONE (DELTASONE) 20 MG Tab    fluticasone (FLOVENT HFA) 110 MCG/ACT Aerosol     Differential diagnosis, natural history, supportive care, and indications for immediate follow-up discussed at length.   Call if not improving and will consider CT of sinuses.

## 2018-02-26 ENCOUNTER — HOSPITAL ENCOUNTER (OUTPATIENT)
Dept: LAB | Facility: MEDICAL CENTER | Age: 63
End: 2018-02-26
Attending: SPECIALIST
Payer: COMMERCIAL

## 2018-02-26 PROCEDURE — 36415 COLL VENOUS BLD VENIPUNCTURE: CPT

## 2018-02-26 PROCEDURE — 83001 ASSAY OF GONADOTROPIN (FSH): CPT

## 2018-02-27 LAB — FSH SERPL-ACNC: 68.2 MIU/ML

## 2018-03-13 ENCOUNTER — HOSPITAL ENCOUNTER (OUTPATIENT)
Dept: RADIOLOGY | Facility: MEDICAL CENTER | Age: 63
End: 2018-03-13
Attending: SPECIALIST
Payer: COMMERCIAL

## 2018-03-13 DIAGNOSIS — N92.4 PREMENOPAUSAL MENORRHAGIA: ICD-10-CM

## 2018-03-13 DIAGNOSIS — N92.6 IRREGULAR MENSTRUAL CYCLE: ICD-10-CM

## 2018-03-13 PROCEDURE — 76830 TRANSVAGINAL US NON-OB: CPT

## 2018-05-25 ENCOUNTER — HOSPITAL ENCOUNTER (OUTPATIENT)
Dept: LAB | Facility: MEDICAL CENTER | Age: 63
End: 2018-05-25
Attending: INTERNAL MEDICINE
Payer: COMMERCIAL

## 2018-05-25 LAB — TSH SERPL DL<=0.005 MIU/L-ACNC: 0.44 UIU/ML (ref 0.38–5.33)

## 2018-05-25 PROCEDURE — 84443 ASSAY THYROID STIM HORMONE: CPT

## 2018-05-25 PROCEDURE — 36415 COLL VENOUS BLD VENIPUNCTURE: CPT

## 2018-05-30 ENCOUNTER — OFFICE VISIT (OUTPATIENT)
Dept: MEDICAL GROUP | Facility: PHYSICIAN GROUP | Age: 63
End: 2018-05-30
Payer: COMMERCIAL

## 2018-05-30 VITALS
SYSTOLIC BLOOD PRESSURE: 130 MMHG | OXYGEN SATURATION: 95 % | BODY MASS INDEX: 42.51 KG/M2 | WEIGHT: 249 LBS | HEART RATE: 70 BPM | HEIGHT: 64 IN | TEMPERATURE: 97.5 F | DIASTOLIC BLOOD PRESSURE: 78 MMHG

## 2018-05-30 DIAGNOSIS — I10 ESSENTIAL HYPERTENSION: ICD-10-CM

## 2018-05-30 DIAGNOSIS — I83.90 VARICOSE VEIN OF LEG: ICD-10-CM

## 2018-05-30 DIAGNOSIS — R22.43 LOCALIZED SWELLING OF BOTH LOWER LEGS: ICD-10-CM

## 2018-05-30 PROBLEM — R60.0 EDEMA EXTREMITIES: Status: ACTIVE | Noted: 2018-05-30

## 2018-05-30 PROCEDURE — 99214 OFFICE O/P EST MOD 30 MIN: CPT | Performed by: FAMILY MEDICINE

## 2018-05-30 RX ORDER — HYDROCHLOROTHIAZIDE 25 MG/1
25 TABLET ORAL DAILY
Qty: 90 TAB | Refills: 0 | Status: SHIPPED | OUTPATIENT
Start: 2018-05-30 | End: 2018-08-13 | Stop reason: SDUPTHER

## 2018-05-30 ASSESSMENT — PATIENT HEALTH QUESTIONNAIRE - PHQ9: CLINICAL INTERPRETATION OF PHQ2 SCORE: 0

## 2018-05-30 ASSESSMENT — PAIN SCALES - GENERAL: PAINLEVEL: NO PAIN

## 2018-06-03 NOTE — PROGRESS NOTES
Subjective:   Kiley Foss is a 62 y.o. female here today for leg swelling    HPI :     Concerned about swelling and knots on the legs and feet.No pain, redness, rash.  Hypertension managed with low dose HCTZ.No shortness of breath, chest pain.  Recent labs with normal TSH.Routine labs last year unremarkable.    Current medicines (including changes today)  Current Outpatient Prescriptions   Medication Sig Dispense Refill   • hydroCHLOROthiazide (HYDRODIURIL) 25 MG Tab Take 1 Tab by mouth every day. 90 Tab 0   • fluticasone (FLONASE) 50 MCG/ACT nasal spray SPRAY 2 SPRAYS IN EACH NOSTRIL EVERY DAY 1 Bottle 6   • albuterol (PROVENTIL) 2.5mg/3ml Nebu Soln solution for nebulization INHALE 1 VIAL BY MOUTH VIA NEBULIZER EVERY 4 HOURS AS NEEDED FOR SHORTNESS OF BREATH 75 mL 2   • sertraline (ZOLOFT) 50 MG Tab TAKE 1 TABLET BY MOUTH EVERY DAY 90 Tab 0   • PROVENTIL  (90 Base) MCG/ACT Aero Soln inhalation aerosol INHALE 2 PUFFS BY MOUTH EVERY 6 HOURS AS NEEDED 20.1 g 3   • alprazolam (XANAX) 0.25 MG Tab Take 1 Tab by mouth 1 time daily as needed for Anxiety (with flying). 10 Tab 0   • cyanocobalamin (VITAMIN B-12) 100 MCG TABS Take 100 mcg by mouth every day.     • vitamin D (CHOLECALCIFEROL) 1000 UNIT TABS Take 1,000 Units by mouth every day.     • beclomethasone (QVAR) 40 MCG/ACT inhaler Inhale 2 Puffs by mouth 2 Times a Day. 3 Inhaler 3   • Cetirizine HCl (ZYRTEC PO) Take  by mouth.     • Multiple Vitamins-Minerals (WOMENS MULTI VITAMIN & MINERAL PO) Take  by mouth.     • NORETHINDRONE ACETATE PO Take  by mouth.       No current facility-administered medications for this visit.      She  has a past medical history of Anxiety; ASTHMA; Dyslipidemia; GERD (gastroesophageal reflux disease); Hemorrhage; Hemorrhoids (11/12/15); Multiple thyroid nodules; Non-seasonal allergic rhinitis; Obesity -- 45 (7/27/2011); RAD (reactive airway disease) (7/1/2011); and Simple phobia. She also has no past medical history of Breast  "cancer (HCC).    ROS   No chest pain, no shortness of breath, no abdominal pain       Objective:     Blood pressure 130/78, pulse 70, temperature 36.4 °C (97.5 °F), height 1.626 m (5' 4\"), weight 112.9 kg (249 lb), SpO2 95 %. Body mass index is 42.74 kg/m².     PHYSICAL EXAM     GEN: Alert and oriented,well appearing, no acute distress  SKIN: warm, dry to touch, no rashes or lesions in visible areas  PSYCH: mood and affect normal, judgement normal  RESPIRATORY : Unlabored respiratory effort, no distress noted, clear to auscultation bilaterally, no wheeze, rhonchi, crackles  CARDIOVASCULAR: RRR, S1 S2 normal, no murmurs  MUSCULOSKELETAL : Normal gait and stance, no obvious abnormalities   NEURO: No overt focal neurologic deficits,sensation intact  EXT : B/L trace pedal edema, multiple varicose veins present bilaterally      Assessment and Plan:   The following treatment plan was discussed    1. Localized swelling of both lower legs  2. Varicose vein of leg  New problem  Reassured patient.This is chronic but benign condition.Do not suspect any cardiac cause due to absence of associated symptoms.Will get more recent Cr/GFR.Advised leg elevation, compression stockings.  - BASIC METABOLIC PANEL; Future    3. Essential hypertension  BP borderline.Will increase HCTZ to 25 mg daily.Goal BP < 130/80  - hydroCHLOROthiazide (HYDRODIURIL) 25 MG Tab; Take 1 Tab by mouth every day.  Dispense: 90 Tab; Refill: 0      Followup: Return if symptoms worsen or fail to improve.         Please note that this dictation was created using voice recognition software. I have made every reasonable attempt to correct obvious errors, but I expect that there are errors of grammar and possibly content that I did not discover before finalizing the note.  "

## 2018-06-14 ENCOUNTER — HOSPITAL ENCOUNTER (OUTPATIENT)
Dept: LAB | Facility: MEDICAL CENTER | Age: 63
End: 2018-06-14
Attending: FAMILY MEDICINE
Payer: COMMERCIAL

## 2018-06-14 DIAGNOSIS — R22.43 LOCALIZED SWELLING OF BOTH LOWER LEGS: ICD-10-CM

## 2018-06-14 LAB
ANION GAP SERPL CALC-SCNC: 13 MMOL/L (ref 0–11.9)
BUN SERPL-MCNC: 29 MG/DL (ref 8–22)
CALCIUM SERPL-MCNC: 9.5 MG/DL (ref 8.5–10.5)
CHLORIDE SERPL-SCNC: 104 MMOL/L (ref 96–112)
CO2 SERPL-SCNC: 24 MMOL/L (ref 20–33)
CREAT SERPL-MCNC: 0.86 MG/DL (ref 0.5–1.4)
GLUCOSE SERPL-MCNC: 82 MG/DL (ref 65–99)
POTASSIUM SERPL-SCNC: 3.5 MMOL/L (ref 3.6–5.5)
SODIUM SERPL-SCNC: 141 MMOL/L (ref 135–145)

## 2018-06-14 PROCEDURE — 36415 COLL VENOUS BLD VENIPUNCTURE: CPT

## 2018-06-14 PROCEDURE — 80048 BASIC METABOLIC PNL TOTAL CA: CPT

## 2018-06-15 ENCOUNTER — HOSPITAL ENCOUNTER (OUTPATIENT)
Dept: RADIOLOGY | Facility: MEDICAL CENTER | Age: 63
End: 2018-06-15
Attending: SPECIALIST
Payer: COMMERCIAL

## 2018-06-15 DIAGNOSIS — Z12.31 ENCOUNTER FOR SCREENING MAMMOGRAM FOR MALIGNANT NEOPLASM OF BREAST: ICD-10-CM

## 2018-06-15 DIAGNOSIS — R92.30 DENSE BREAST TISSUE: ICD-10-CM

## 2018-06-15 PROCEDURE — 76641 ULTRASOUND BREAST COMPLETE: CPT

## 2018-06-15 PROCEDURE — 77067 SCR MAMMO BI INCL CAD: CPT

## 2018-06-20 ENCOUNTER — OFFICE VISIT (OUTPATIENT)
Dept: MEDICAL GROUP | Facility: PHYSICIAN GROUP | Age: 63
End: 2018-06-20
Payer: COMMERCIAL

## 2018-06-20 VITALS
OXYGEN SATURATION: 94 % | BODY MASS INDEX: 42.68 KG/M2 | WEIGHT: 250 LBS | HEIGHT: 64 IN | DIASTOLIC BLOOD PRESSURE: 82 MMHG | SYSTOLIC BLOOD PRESSURE: 148 MMHG | TEMPERATURE: 97 F | HEART RATE: 81 BPM

## 2018-06-20 DIAGNOSIS — E66.01 MORBID OBESITY WITH BMI OF 40.0-44.9, ADULT (HCC): ICD-10-CM

## 2018-06-20 DIAGNOSIS — I83.90 VARICOSE VEIN OF LEG: ICD-10-CM

## 2018-06-20 DIAGNOSIS — M25.562 CHRONIC PAIN OF BOTH KNEES: ICD-10-CM

## 2018-06-20 DIAGNOSIS — Z23 NEED FOR VACCINATION: ICD-10-CM

## 2018-06-20 DIAGNOSIS — M25.561 CHRONIC PAIN OF BOTH KNEES: ICD-10-CM

## 2018-06-20 DIAGNOSIS — G89.29 CHRONIC PAIN OF BOTH KNEES: ICD-10-CM

## 2018-06-20 PROCEDURE — 99214 OFFICE O/P EST MOD 30 MIN: CPT | Performed by: FAMILY MEDICINE

## 2018-06-20 NOTE — PROGRESS NOTES
Subjective:   Kiley Foss is a 62 y.o. female here today for varicose veins, bilateral knee pain, obesity    Varicose vein of leg  Ongoing issues; patient continues to report issues with varicose veins in both of her legs. She states in 2015 she was evaluated by vascular medicine specialist and was planning to have a procedure but at the time she cannot financially afford it. She states that she would like to be reevaluated this time to see if anything else can be done to help with the pain and swelling.    Morbid obesity with BMI of 40.0-44.9, adult (HCC)  Ongoing issues; patient reports that she has been trying to eat healthy and even tried Weight Watchers again. She states that she continues to have issues with weight loss. At this time she is not getting any regular exercise due to pain in both of her knees.    Chronic pain of both knees  This problem is new to me. Patient is reporting bilateral knee pain that is worse with activity and better with rest. She states there is some occasional swelling but there is no radiation of the pain; no numbness or tingling.         Current medicines (including changes today)  Current Outpatient Prescriptions   Medication Sig Dispense Refill   • Zoster Vac Recomb Adjuvanted (SHINGRIX) 50 MCG Recon Susp 0.5 mL by Intramuscular route Once for 1 dose. 1 Each 0   • hydroCHLOROthiazide (HYDRODIURIL) 25 MG Tab Take 1 Tab by mouth every day. 90 Tab 0   • fluticasone (FLONASE) 50 MCG/ACT nasal spray SPRAY 2 SPRAYS IN EACH NOSTRIL EVERY DAY 1 Bottle 6   • sertraline (ZOLOFT) 50 MG Tab TAKE 1 TABLET BY MOUTH EVERY DAY 90 Tab 0   • cyanocobalamin (VITAMIN B-12) 100 MCG TABS Take 100 mcg by mouth every day.     • vitamin D (CHOLECALCIFEROL) 1000 UNIT TABS Take 1,000 Units by mouth every day.     • Cetirizine HCl (ZYRTEC PO) Take  by mouth.     • Multiple Vitamins-Minerals (WOMENS MULTI VITAMIN & MINERAL PO) Take  by mouth.     • albuterol (PROVENTIL) 2.5mg/3ml Nebu Soln solution  "for nebulization INHALE 1 VIAL BY MOUTH VIA NEBULIZER EVERY 4 HOURS AS NEEDED FOR SHORTNESS OF BREATH 75 mL 2   • PROVENTIL  (90 Base) MCG/ACT Aero Soln inhalation aerosol INHALE 2 PUFFS BY MOUTH EVERY 6 HOURS AS NEEDED 20.1 g 3   • alprazolam (XANAX) 0.25 MG Tab Take 1 Tab by mouth 1 time daily as needed for Anxiety (with flying). 10 Tab 0   • NORETHINDRONE ACETATE PO Take  by mouth.     • beclomethasone (QVAR) 40 MCG/ACT inhaler Inhale 2 Puffs by mouth 2 Times a Day. 3 Inhaler 3     No current facility-administered medications for this visit.      She  has a past medical history of Anxiety; ASTHMA; Dyslipidemia; GERD (gastroesophageal reflux disease); Hemorrhage; Hemorrhoids (11/12/15); Multiple thyroid nodules; Non-seasonal allergic rhinitis; Obesity -- 43 (7/27/2011); RAD (reactive airway disease) (7/1/2011); and Simple phobia. She also has no past medical history of Breast cancer (HCC).    ROS   No chest pain, no shortness of breath, no abdominal pain  + Bilateral knee pain; lower extremity swelling     Objective:     Blood pressure 148/82, pulse 81, temperature 36.1 °C (97 °F), height 1.626 m (5' 4\"), weight 113.4 kg (250 lb), SpO2 94 %. Body mass index is 42.91 kg/m².   Physical Exam:  Alert, oriented in no acute distress.  Eye contact is good, speech goal directed, affect calm  HEENT: conjunctiva non-injected, sclera non-icteric.  Pinna normal. TM pearly gray.   Oral mucous membranes pink and moist with no lesions.  Neck No adenopathy or masses in the neck or supraclavicular regions.  Lungs: clear to auscultation bilaterally with good excursion.  CV: regular rate and rhythm.  Abdomen: soft, nontender, No CVAT; obese  Ext: Trace edema to bilateral ankles, color normal, vascularity normal, temperature normal        Assessment and Plan:   The following treatment plan was discussed   1. Varicose vein of leg  REFERRAL TO VASCULAR MEDICINE Reason for Referral? Established Vascular Disease    Uncontrolled; " referral back to vascular medicine specialist for reevaluation   2. Chronic pain of both knees  REFERRAL TO ORTHOPEDICS    Uncontrolled; referral to orthopedic specialist for further evaluation and treatment   3. Morbid obesity with BMI of 40.0-44.9, adult (HCC)  Patient identified as having weight management issue.  Appropriate orders and counseling given.    REFERRAL TO [facility] (HIP) Services Requested: Physician Medical Weight Management Program; Reason for Referral? BMI>30; Reason for Visit: Overweight/Obesity    Uncontrolled; referral to weight management program; monitor   4. Need for vaccination  Zoster Vac Recomb Adjuvanted (SHINGRIX) 50 MCG Recon Susp    Prescription for shingles vaccine provided       Followup: Return in about 5 months (around 11/20/2018) for lab review, Short.

## 2018-06-20 NOTE — ASSESSMENT & PLAN NOTE
This problem is new to me. Patient is reporting bilateral knee pain that is worse with activity and better with rest. She states there is some occasional swelling but there is no radiation of the pain; no numbness or tingling.

## 2018-06-20 NOTE — ASSESSMENT & PLAN NOTE
Ongoing issues; patient continues to report issues with varicose veins in both of her legs. She states in 2015 she was evaluated by vascular medicine specialist and was planning to have a procedure but at the time she cannot financially afford it. She states that she would like to be reevaluated this time to see if anything else can be done to help with the pain and swelling.

## 2018-06-20 NOTE — ASSESSMENT & PLAN NOTE
Ongoing issues; patient reports that she has been trying to eat healthy and even tried Weight Watchers again. She states that she continues to have issues with weight loss. At this time she is not getting any regular exercise due to pain in both of her knees.

## 2018-06-21 ENCOUNTER — TELEPHONE (OUTPATIENT)
Dept: MEDICAL GROUP | Facility: PHYSICIAN GROUP | Age: 63
End: 2018-06-21

## 2018-06-21 NOTE — TELEPHONE ENCOUNTER
bun is blood urea nitrogen which is an indication of your kidneys filtering protein; anion gap has to do with how your kidneys are filtering electrolytes. These numbers are still within an acceptable range and could've indicated that you were dehydrated on the day that the blood work was done. Her numbers have always been within a recommended range.    Marysol Navarro D.O.

## 2018-06-21 NOTE — TELEPHONE ENCOUNTER
Anion gap and BUN were flagged in her blood work. She would like to know what each of those are and what does this mean. Thank you

## 2018-08-20 ENCOUNTER — TELEPHONE (OUTPATIENT)
Dept: MEDICAL GROUP | Facility: PHYSICIAN GROUP | Age: 63
End: 2018-08-20

## 2018-08-20 NOTE — TELEPHONE ENCOUNTER
1. Caller Name: [facility]                                         Call Back Number: [phone]      Patient approves a detailed voicemail message: N\A    Current medication PROVENTIL  (90 Base) MCG/ACT Aero Soln inhalation aerosol is no longer covered by insurance. Preferred  Alternative is Proairhfa, Proairrespiclick, Ventolinhfa.  Please send in a new Rx for one of the Alternatives or would you like to start a Prior Auth

## 2018-08-21 RX ORDER — ALBUTEROL SULFATE 90 UG/1
2 AEROSOL, METERED RESPIRATORY (INHALATION) EVERY 6 HOURS PRN
Qty: 8.5 G | Refills: 3 | Status: SHIPPED | OUTPATIENT
Start: 2018-08-21 | End: 2019-05-13

## 2018-09-13 ENCOUNTER — TELEPHONE (OUTPATIENT)
Dept: MEDICAL GROUP | Facility: PHYSICIAN GROUP | Age: 63
End: 2018-09-13

## 2018-09-13 NOTE — TELEPHONE ENCOUNTER
Refill request for QVAR Inhaler 40 mg. Manufacture no longer makes Alternative is QVAR 40 mg REDI INHALER  Please send in a new RX.  Thank you

## 2018-11-14 ENCOUNTER — OFFICE VISIT (OUTPATIENT)
Dept: MEDICAL GROUP | Facility: PHYSICIAN GROUP | Age: 63
End: 2018-11-14
Payer: COMMERCIAL

## 2018-11-14 VITALS
HEIGHT: 64 IN | TEMPERATURE: 98.2 F | SYSTOLIC BLOOD PRESSURE: 136 MMHG | WEIGHT: 246 LBS | BODY MASS INDEX: 42 KG/M2 | OXYGEN SATURATION: 95 % | HEART RATE: 74 BPM | RESPIRATION RATE: 12 BRPM | DIASTOLIC BLOOD PRESSURE: 82 MMHG

## 2018-11-14 DIAGNOSIS — E04.2 MULTIPLE THYROID NODULES: ICD-10-CM

## 2018-11-14 DIAGNOSIS — F41.9 ANXIETY: ICD-10-CM

## 2018-11-14 DIAGNOSIS — I10 ESSENTIAL HYPERTENSION: ICD-10-CM

## 2018-11-14 DIAGNOSIS — E66.01 MORBID OBESITY WITH BMI OF 40.0-44.9, ADULT (HCC): ICD-10-CM

## 2018-11-14 DIAGNOSIS — F13.10: ICD-10-CM

## 2018-11-14 DIAGNOSIS — J30.1 NON-SEASONAL ALLERGIC RHINITIS DUE TO POLLEN: ICD-10-CM

## 2018-11-14 PROCEDURE — 99214 OFFICE O/P EST MOD 30 MIN: CPT | Performed by: FAMILY MEDICINE

## 2018-11-14 NOTE — ASSESSMENT & PLAN NOTE
This is a chronic medical problem for the last few years.  It has been well controlled with as needed 0.25 mg Xanax.  She utilizes a Xanax occasionall

## 2018-11-14 NOTE — ASSESSMENT & PLAN NOTE
This is a chronic medical problem which is well controlled with her Zoloft 50 mg and as needed Xanax 0.25 mg.    She does take the Zoloft daily takes the Xanax rarely. As a  Matter of fact, she does have some left over  from 2017.  We will need to sign a controlled substance contract and obtain a urine tox screen today.

## 2018-11-14 NOTE — PROGRESS NOTES
CC:  Diagnoses of Essential hypertension, Non-seasonal allergic rhinitis due to pollen, Anxiety, Morbid obesity with BMI of 40.0-44.9, adult ([facility]), Sedative, hypnotic or anxiolytic abuse, episodic (HCC), and Multiple thyroid nodules were pertinent to this visit.    HISTORY OF THE PRESENT ILLNESS: Patient is a 63 y.o. female. This pleasant patient is here today to establish care.    Health Maintenance: She received her flu vaccine this year.      Essential hypertension  This is a chronic medical problem which is well controlled with hydrochlorothiazide 25 mg daily.  Her blood pressure today is 136/82.  She denies any headaches or chest pain.  She does have some lower extremity swelling.    Non-seasonal allergic rhinitis  This is a chronic medical problem for which she has had multiple flareups this past year.  She takes Zyrtec and Flonase on a daily basis.   She also mentions that she recently received a  Kenalog injection.  Many times her allergies will lead to bronchitis and at that point she will utilize her albuterol or Qvar inhalers.  She denies any coughing or shortness of breath today.  She has been on oral steroids frequently in the past and is concerned about their long-term effects.    Anxiety  This is a chronic medical problem which is well controlled with her Zoloft 50 mg and as needed Xanax 0.25 mg.    She does take the Zoloft daily and takes the Xanax rarely. As a  Matter of fact, she does have some Xanax left over  from 2017.  We will need to sign a controlled substance contract and obtain a urine tox screen today for future refills.    Morbid obesity with BMI of 40.0-44.9, adult (HCC)  This is a chronic medical problem for her.  She is unable to exercise as much due to her bilateral knee arthritis.  She is interested in a referral to the [employer] program.     Multiple thyroid nodules  This is a chronic medical problem for which she has been followed by Dr. Collins.  Her most recent TSH was  performed in May 2018.  She is scheduled for another follow-up with Dr. Karina bass.  She denies any dysphagia,  dry skin or constipation.    Sedative, hypnotic or anxiolytic abuse, episodic (HCC)  This is a chronic medical problem for the last few years.  It has been well controlled with as needed 0.25 mg Xanax.  She utilizes a Xanax occasionall    Allergies: Augmentin; Cephalexin; Pcn [penicillins]; and Sulfa drugs    Current Outpatient Prescriptions Ordered in [facility]   Medication Sig Dispense Refill   • aspirin EC (ECOTRIN) 81 MG Tablet Delayed Response Take 81 mg by mouth every day.     • beclomethasone (QVAR) 40 MCG/ACT inhaler Inhale 1 Puff by mouth 2 Times a Day. 1 Inhaler 4   • hydroCHLOROthiazide (HYDRODIURIL) 25 MG Tab TAKE 1 TABLET BY MOUTH EVERY DAY 90 Tab 3   • fluticasone (FLONASE) 50 MCG/ACT nasal spray SPRAY 2 SPRAYS IN EACH NOSTRIL EVERY DAY 1 Bottle 6   • sertraline (ZOLOFT) 50 MG Tab TAKE 1 TABLET BY MOUTH EVERY DAY 90 Tab 0   • PROVENTIL  (90 Base) MCG/ACT Aero Soln inhalation aerosol INHALE 2 PUFFS BY MOUTH EVERY 6 HOURS AS NEEDED 20.1 g 3   • alprazolam (XANAX) 0.25 MG Tab Take 1 Tab by mouth 1 time daily as needed for Anxiety (with flying). 10 Tab 0   • cyanocobalamin (VITAMIN B-12) 100 MCG TABS Take 100 mcg by mouth every day.     • vitamin D (CHOLECALCIFEROL) 1000 UNIT TABS Take 1,000 Units by mouth every day.     • Cetirizine HCl (ZYRTEC PO) Take  by mouth.     • Multiple Vitamins-Minerals (WOMENS MULTI VITAMIN & MINERAL PO) Take  by mouth.     • albuterol 108 (90 Base) MCG/ACT Aero Soln inhalation aerosol Inhale 2 Puffs by mouth every 6 hours as needed for Shortness of Breath. 8.5 g 3   • albuterol (PROVENTIL) 2.5mg/3ml Nebu Soln solution for nebulization INHALE 1 VIAL BY MOUTH VIA NEBULIZER EVERY 4 HOURS AS NEEDED FOR SHORTNESS OF BREATH 75 mL 2     No current Epic-ordered facility-administered medications on file.        Past Medical History:   Diagnosis Date   • Anxiety    • ASTHMA     • Dyslipidemia    • GERD (gastroesophageal reflux disease)    • Hemorrhage    • Hemorrhoids 11/12/15    colonoscopy    • Multiple thyroid nodules    • Non-seasonal allergic rhinitis    • Obesity -- 45 7/27/2011   • RAD (reactive airway disease) 7/1/2011   • Simple phobia     flying       Past Surgical History:   Procedure Laterality Date   • BIOPSY GENERAL      thyroid    • SALPINGECTOMY      ectopic pregnancy   • SINUSOTOMIES     • TONSILLECTOMY     • TUBAL COAGULATION LAPAROSCOPIC BILATERAL         Social History   Substance Use Topics   • Smoking status: Never Smoker   • Smokeless tobacco: Never Used   • Alcohol use Yes      Comment: twice a month       Social History     Social History Narrative   • No narrative on file       Family History   Problem Relation Age of Onset   • Hypertension Mother    • Diabetes Mother    • Cancer Father         Non Hodgekin's Lymphoma   • Cancer Sister         cervical   • Cancer Brother         skin   • Heart Disease Maternal Grandfather    • Stroke Maternal Grandfather    • Heart Disease Paternal Grandfather    • Stroke Paternal Grandfather        ROS:     - Constitutional: Negative for fever, chills, and fatigue   - Eyes:  Negative blurry vision or eye pain    - ENT: Negative for ear pain, rhinorrhea, sinus congestion, sore throat    - Respiratory: Negative for cough or shortness of breath    - Cardiovascular: Negative for chest pain, palpitations    - Gastrointestinal: Negative for heartburn, nausea, vomiting, abdominal pain,     - Genitourinary: Negative for dysuria    - Musculoskeletal: Negative for myalgias    - Skin: Negative for rash, itching    - Neurological: Negative for headaches, dizziness    - Endo:  Negative for increased thirst or polyuria    - Heme/Lymph: Does not bruise/bleed easily.     - Psychiatric/Behavioral: Negative for depression and anxiety   .      Exam: Blood pressure 136/82, pulse 74, temperature 36.8 °C (98.2 °F), resp. rate 12, height 1.626 m (5'  "4\"), weight 111.6 kg (246 lb), SpO2 95 %. Body mass index is 42.23 kg/m².    General:  Normal appearing. No distress.  Eyes:  Eyes conjunctiva clear lids without ptosis, pupils equal and reactive to light accommodation,   ENMT:   ears normal shape and contour, canals are clear bilaterally, tympanic membranes are benign, nasal mucosa benign, oropharynx is without erythema, edema or exudates.   Neck:  Supple without JVD or bruit.   Pulmonary:  Clear to ausculation.  Normal effort. No rales, ronchi, or wheezing.  Cardiovascular:  1+ LE edema Regular rate and rhythm without murmur.  Abdomen:  Soft, nontender, nondistended. Normal bowel sounds.  Neurologic:  No tremors  Lymph:  No cervical, supraclavicular  lymph nodes are palpable  Skin: LE varicosities Warm and dry.  No obvious lesions.  Musculoskeletal:  Normal gait. No extremity cyanosis, clubbing, or edema.  Psych:   Normal mood and affect. Alert and oriented x3. Judgment and insight is normal.    Please note that this dictation was created using voice recognition software. I have made every reasonable attempt to correct obvious errors, but I expect that there are errors of grammar and possibly content that I did not discover before finalizing the note.      Assessment/Plan  1. Essential hypertension  This is a chronic medical problem which is well controlled with hydrochlorothiazide.  - BASIC METABOLIC PANEL; Future  - Lipid Profile; Future  - CBC WITHOUT DIFFERENTIAL; Future    2. Non-seasonal allergic rhinitis due to pollen  This is a chronic medical problem which is currently well controlled with Flonase and Zyrtec.    3. Anxiety  This is a chronic medical problem which is well controlled with Zoloft and as needed Xanax.  - [facility] PAIN MANAGEMENT SCREEN; Future  - Controlled Substance Treatment Agreement    4. Morbid obesity with BMI of 40.0-44.9, adult (HCC)  Chronic medical problem which is currently not well controlled.  Will refer to [facility] " improvement programs.  - REFERRAL TO [facility] IMPROVEMENT [facility]GRAMS (HIP) Services Requested: Physician Medical Weight Management Program, Registered Dietitian for Medical Nutrition Therapy; Reason for Referral? BMI>30; Reason for Visit: Overweight/Obesity    5. Multiple thyroid nodules  This is a chronic medical problem for which she continues to follow-up with endocrinology.  Currently stable.        Return in about 2 weeks (around 11/28/2018), or 2-3 weeks, for Controlled Substances Management.

## 2018-11-14 NOTE — LETTER
[facility]  Anne Ryan M.D.  [address] [place] [place] [address]  Fax: [phone]   Authorization for Release/Disclosure of   Protected Health Information   Name: NIMO PINON : 1955 SSN: [id number]   Address: [address] [place] [address] Phone:    [phone] (home)    I authorize the entity listed below to release/disclose the PHI below to:   [facility]/Anne Ryan M.D. and Anne Ryan M.D.   Provider or Entity Name:  Vascular Surgeon, Kelley [facility]   Address   City, State, [facility] Phone:      Fax:     Reason for request: continuity of care   Information to be released:    [  ] LAST COLONOSCOPY,  including any PATH REPORT and follow-up  [  ] LAST FIT/COLOGUARD RESULT [  ] LAST DEXA  [  ] LAST MAMMOGRAM  [  ] LAST PAP  [  ] LAST LABS [  ] RETINA EXAM REPORT  [  ] IMMUNIZATION RECORDS  [xxx] Release all info      [  ] Check here and initial the line next to each item to release ALL health information INCLUDING  _____ Care and treatment for drug and / or alcohol abuse  _____ HIV testing, infection status, or AIDS  _____ Genetic Testing    DATES OF SERVICE OR TIME PERIOD TO BE DISCLOSED: _____________  I understand and acknowledge that:  * This Authorization may be revoked at any time by you in writing, except if your health information has already been used or disclosed.  * Your health information that will be used or disclosed as a result of you signing this authorization could be re-disclosed by the recipient. If this occurs, your re-disclosed health information may no longer be protected by State or Federal laws.  * You may refuse to sign this Authorization. Your refusal will not affect your ability to obtain treatment.  * This Authorization becomes effective upon signing and will  on (date) __________.      If no date is indicated, this Authorization will  one (1) year from the signature date.    Name: Nimo  Prudence    Signature:   Date:     11/14/2018       PLEASE FAX REQUESTED RECORDS BACK TO: [phone]

## 2018-11-14 NOTE — ASSESSMENT & PLAN NOTE
This is a chronic medical problem for which she is been followed by Dr. Collins.  Her most recent TSH was performed in May 2018.  She is scheduled for another follow-up with Dr. Collins soon.  She denies any dysphagia,  dry skin or constipation.

## 2018-11-14 NOTE — ASSESSMENT & PLAN NOTE
This is a chronic medical problem which is well controlled with hydrochlorothiazide 25 mg daily.  Her blood pressure today is 136/82.  She denies any headaches or chest pain.

## 2018-11-14 NOTE — ASSESSMENT & PLAN NOTE
This is a chronic medical problem for which she has had multiple flareups this past year.  She takes Zyrtec and Flonase on a daily basis.   She also mentions that she recently received a  Kenalog injection.  Many times her allergies will lead to bronchitis and at that point she will utilize her albuterol or Qvar inhalers.  She denies any coughing or shortness of breath today.

## 2018-11-14 NOTE — LETTER
[facility]  Anne Ryan M.D.  [address]  [facility] [address]  Fax: [phone]   Authorization for Release/Disclosure of   Protected Health Information   Name: KILEY PINON : 1955 SSN: [id number]   Address: [address] [place] [address] Phone:    [phone] (home)    I authorize the entity listed below to release/disclose the PHI below to:   [facility]/Anne Ryan M.D. and Anne Ryan M.D.   Provider or Entity Name:  Dr. Peralta   Address   City, [facility] Phone:      Fax:     Reason for request: continuity of care   Information to be released:    [  ] LAST COLONOSCOPY,  including any PATH REPORT and follow-up  [  ] LAST FIT/COLOGUARD RESULT [  ] LAST DEXA  [  ] LAST MAMMOGRAM  [  ] LAST PAP  [  ] LAST LABS [  ] RETINA EXAM REPORT  [  ] IMMUNIZATION RECORDS  [xxx] Release all info      [  ] Check here and initial the line next to each item to release ALL health information INCLUDING  _____ Care and treatment for drug and / or alcohol abuse  _____ HIV testing, infection status, or AIDS  _____ Genetic Testing    DATES OF SERVICE OR TIME PERIOD TO BE DISCLOSED: _____________  I understand and acknowledge that:  * This Authorization may be revoked at any time by you in writing, except if your health information has already been used or disclosed.  * Your health information that will be used or disclosed as a result of you signing this authorization could be re-disclosed by the recipient. If this occurs, your re-disclosed health information may no longer be protected by State or Federal laws.  * You may refuse to sign this Authorization. Your refusal will not affect your ability to obtain treatment.  * This Authorization becomes effective upon signing and will  on (date) __________.      If no date is indicated, this Authorization will  one (1) year from the signature date.    Name: Kiley Pinon    Signature:   Date:     2018       PLEASE  FAX REQUESTED RECORDS BACK TO: [phone]

## 2018-11-14 NOTE — ASSESSMENT & PLAN NOTE
This is a chronic medical problem for her.  She is unable to exercise as much due to her bilateral knee arthritis.  She is interested in a referral to the [facility] program.

## 2018-11-26 ENCOUNTER — TELEPHONE (OUTPATIENT)
Dept: MEDICAL GROUP | Facility: PHYSICIAN GROUP | Age: 63
End: 2018-11-26

## 2018-11-26 NOTE — TELEPHONE ENCOUNTER
VOICEMAIL  1. Caller Name: Kiley Foss                      Call Back Number: [phone] (home)     2. Message: patient states that Dr. Ryan would like to see her for lab results, she has not done them as she is waiting for her to get the appointment. Unfortunately we are booked out, she is requesting her appointment be after December 11th and it is ok to book out to January if needed. What date and time works best for you. Please advise    3. Patient approves office to leave a detailed voicemail/MyChart message: yes

## 2018-11-27 ENCOUNTER — HOSPITAL ENCOUNTER (OUTPATIENT)
Dept: LAB | Facility: MEDICAL CENTER | Age: 63
End: 2018-11-27
Attending: FAMILY MEDICINE
Payer: COMMERCIAL

## 2018-11-27 DIAGNOSIS — I10 ESSENTIAL HYPERTENSION: ICD-10-CM

## 2018-11-27 LAB
ANION GAP SERPL CALC-SCNC: 8 MMOL/L (ref 0–11.9)
BUN SERPL-MCNC: 17 MG/DL (ref 8–22)
CALCIUM SERPL-MCNC: 9.4 MG/DL (ref 8.5–10.5)
CHLORIDE SERPL-SCNC: 103 MMOL/L (ref 96–112)
CHOLEST SERPL-MCNC: 196 MG/DL (ref 100–199)
CO2 SERPL-SCNC: 27 MMOL/L (ref 20–33)
CREAT SERPL-MCNC: 0.77 MG/DL (ref 0.5–1.4)
ERYTHROCYTE [DISTWIDTH] IN BLOOD BY AUTOMATED COUNT: 43.5 FL (ref 35.9–50)
FASTING STATUS PATIENT QL REPORTED: NORMAL
GLUCOSE SERPL-MCNC: 75 MG/DL (ref 65–99)
HCT VFR BLD AUTO: 45.9 % (ref 37–47)
HDLC SERPL-MCNC: 50 MG/DL
HGB BLD-MCNC: 15.5 G/DL (ref 12–16)
LDLC SERPL CALC-MCNC: 123 MG/DL
MCH RBC QN AUTO: 30.6 PG (ref 27–33)
MCHC RBC AUTO-ENTMCNC: 33.8 G/DL (ref 33.6–35)
MCV RBC AUTO: 90.5 FL (ref 81.4–97.8)
PLATELET # BLD AUTO: 230 K/UL (ref 164–446)
PMV BLD AUTO: 10.8 FL (ref 9–12.9)
POTASSIUM SERPL-SCNC: 3.8 MMOL/L (ref 3.6–5.5)
RBC # BLD AUTO: 5.07 M/UL (ref 4.2–5.4)
SODIUM SERPL-SCNC: 138 MMOL/L (ref 135–145)
TRIGL SERPL-MCNC: 116 MG/DL (ref 0–149)
WBC # BLD AUTO: 5.9 K/UL (ref 4.8–10.8)

## 2018-11-27 PROCEDURE — 80048 BASIC METABOLIC PNL TOTAL CA: CPT

## 2018-11-27 PROCEDURE — 36415 COLL VENOUS BLD VENIPUNCTURE: CPT

## 2018-11-27 PROCEDURE — 85027 COMPLETE CBC AUTOMATED: CPT

## 2018-11-27 PROCEDURE — 80061 LIPID PANEL: CPT

## 2018-11-27 NOTE — TELEPHONE ENCOUNTER
Phone Number Called: [phone] (home)     Message: Left message to call back    Left Message for patient to call back: yes

## 2018-12-24 NOTE — TELEPHONE ENCOUNTER
Was the patient seen in the last year in this department? Yes     Does patient have an active prescription for medications requested? Yes    Received Request Via: Pharmacy

## 2019-01-28 ENCOUNTER — OFFICE VISIT (OUTPATIENT)
Dept: MEDICAL GROUP | Facility: PHYSICIAN GROUP | Age: 64
End: 2019-01-28
Payer: COMMERCIAL

## 2019-01-28 VITALS
TEMPERATURE: 97.9 F | WEIGHT: 250 LBS | OXYGEN SATURATION: 94 % | SYSTOLIC BLOOD PRESSURE: 132 MMHG | DIASTOLIC BLOOD PRESSURE: 88 MMHG | HEIGHT: 64 IN | RESPIRATION RATE: 12 BRPM | BODY MASS INDEX: 42.68 KG/M2 | HEART RATE: 68 BPM

## 2019-01-28 DIAGNOSIS — E78.00 PURE HYPERCHOLESTEROLEMIA: ICD-10-CM

## 2019-01-28 DIAGNOSIS — F41.9 ANXIETY: ICD-10-CM

## 2019-01-28 DIAGNOSIS — F40.298 SIMPLE PHOBIA: ICD-10-CM

## 2019-01-28 DIAGNOSIS — I10 ESSENTIAL HYPERTENSION: ICD-10-CM

## 2019-01-28 DIAGNOSIS — E66.01 MORBID OBESITY WITH BMI OF 40.0-44.9, ADULT (HCC): ICD-10-CM

## 2019-01-28 DIAGNOSIS — F13.10: ICD-10-CM

## 2019-01-28 PROCEDURE — 99214 OFFICE O/P EST MOD 30 MIN: CPT | Performed by: FAMILY MEDICINE

## 2019-01-28 RX ORDER — ALPRAZOLAM 0.25 MG/1
0.25 TABLET ORAL
Qty: 30 TAB | Refills: 0 | Status: SHIPPED | OUTPATIENT
Start: 2019-01-28 | End: 2019-01-28 | Stop reason: SDUPTHER

## 2019-01-28 RX ORDER — ALPRAZOLAM 0.25 MG/1
0.25 TABLET ORAL
Qty: 10 TAB | Refills: 0 | Status: SHIPPED | OUTPATIENT
Start: 2019-01-28 | End: 2020-12-01 | Stop reason: SDUPTHER

## 2019-01-28 NOTE — PROGRESS NOTES
cc: Follow-up anxiety and discussion of lab results    Subjective:     Kiley Foss is a 63 y.o. female presenting for a Xanax refill and to further discuss her cholesterol lab results.    Essential hypertension  This is a chronic medical problem which is well controlled with cHydrochlorothiazide 25 mg daily.  Her blood pressure today is 132/88.  She denies any headaches or chest pain.    Hyperlipidemia  This is a chronic medical problem which she is currently not on any statin.  She does mention that she was on a statin many years ago and had discontinued it due to some muscle aches.  Her labs were monitored and were fine off of the statin.  However over the last 2 years have been increasing.  Her most recent cholesterol panel from November 2018 shows total cholesterol 196, triglycerides 116, HDL 50, and .    Anxiety  This is a chronic medical problem for which she has been utilizing Xanax 0.25 mg as needed.    She typically takes it for flying or with panic attacks.  She does mention that it has been a few years since she last had to use it.      Asthma  This is a chronic medical problem which is well controlled with As needed albuterol inhaler and Flonase.  She denies any coughing or wheezing.    Sedative, hypnotic or anxiolytic abuse, episodic (HCC)  This is a chronic diagnosis for her.    She will re-sign a controlled substance contract today.      Morbid obesity with BMI of 40.0-44.9, adult (HCC)  This is a chronic medical problem for her.  BMI is 42.91 today.  She just recently bought an exercise bike and has started using it.  She also several times a week and will try to decrease that.        Review of systems:    Patient Active Problem List    Diagnosis Date Noted   • Chronic pain of both knees 06/20/2018   • Edema extremities 05/30/2018   • Low serum vitamin D 09/19/2017   • Sedative, hypnotic or anxiolytic abuse, episodic (HCC) 09/19/2017   • Fear of flying 02/21/2017   • Morbid obesity with  BMI of 40.0-44.9, adult ([facility]) 02/21/2017   • Essential hypertension 07/10/2015   • Varicose vein of leg 07/10/2015   • Routine health maintenance 07/01/2011   • Anxiety    • Hyperlipidemia    • Non-seasonal allergic rhinitis    • ASTHMA    • GERD (gastroesophageal reflux disease)    • Multiple thyroid nodules      Past Medical History:   Diagnosis Date   • Anxiety    • ASTHMA    • Dyslipidemia    • GERD (gastroesophageal reflux disease)    • Hemorrhage    • Hemorrhoids 11/12/15    colonoscopy    • Multiple thyroid nodules    • Non-seasonal allergic rhinitis    • Obesity -- 45 7/27/2011   • RAD (reactive airway disease) 7/1/2011   • Simple phobia     flying     Current Outpatient Prescriptions on File Prior to Visit   Medication Sig Dispense Refill   • sertraline (ZOLOFT) 50 MG Tab TAKE 1 TABLET BY MOUTH EVERY DAY 90 Tab 3   • aspirin EC (ECOTRIN) 81 MG Tablet Delayed Response Take 81 mg by mouth every day.     • hydroCHLOROthiazide (HYDRODIURIL) 25 MG Tab TAKE 1 TABLET BY MOUTH EVERY DAY 90 Tab 3   • fluticasone (FLONASE) 50 MCG/ACT nasal spray SPRAY 2 SPRAYS IN EACH NOSTRIL EVERY DAY 1 Bottle 6   • PROVENTIL  (90 Base) MCG/ACT Aero Soln inhalation aerosol INHALE 2 PUFFS BY MOUTH EVERY 6 HOURS AS NEEDED 20.1 g 3   • cyanocobalamin (VITAMIN B-12) 100 MCG TABS Take 100 mcg by mouth every day.     • vitamin D (CHOLECALCIFEROL) 1000 UNIT TABS Take 1,000 Units by mouth every day.     • Cetirizine HCl (ZYRTEC PO) Take  by mouth.     • Multiple Vitamins-Minerals (WOMENS MULTI VITAMIN & MINERAL PO) Take  by mouth.     • albuterol 108 (90 Base) MCG/ACT Aero Soln inhalation aerosol Inhale 2 Puffs by mouth every 6 hours as needed for Shortness of Breath. 8.5 g 3   • albuterol (PROVENTIL) 2.5mg/3ml Nebu Soln solution for nebulization INHALE 1 VIAL BY MOUTH VIA NEBULIZER EVERY 4 HOURS AS NEEDED FOR SHORTNESS OF BREATH 75 mL 2     No current facility-administered medications on file prior to visit.      Allergies    Allergen Reactions   • Augmentin      Feels anxious   • Cephalexin Vomiting   • Pcn [Penicillins]    • Sulfa Drugs      Family History   Problem Relation Age of Onset   • Hypertension Mother    • Diabetes Mother    • Cancer Father         Non Hodgekin's Lymphoma   • Cancer Sister         cervical   • Cancer Brother         skin   • Heart Disease Maternal Grandfather    • Stroke Maternal Grandfather    • Heart Disease Paternal Grandfather    • Stroke Paternal Grandfather      Social History   Substance Use Topics   • Smoking status: Never Smoker   • Smokeless tobacco: Never Used   • Alcohol use Yes      Comment: twice a month       ROS  Constitutional :  No fevers, chills, fatigue.  Eye :  No eye pain, no redness, no  dry eyes  ENT: no sore throat, no tinnitus  Respiratory : No chronic cough, No shortness of breath,  Cardiovascular : No chest pain, No palpitations  Gastrointestinal : No abdominal pain, No nausea, vomiting, diarrhea, or constipation  Genitourinary:  no dysuria, no hematuria  Musculoskeletal/Extremities : no muscle weakness, no joint swelling,  Hematologic/Lymphatic :  No easy bruising, No night sweats, No swollen nodes  Skin/Integumentary :No evidence of rash. No pruritus  Neurologic : No headaches,  No tremors,  Psychiatric : No depression, No anxiety      Current Outpatient Prescriptions:   •  norethindrone (AYGESTIN) 5 MG tablet, TK 1 T PO D FOR THE FIRST 12 DAYS OF THE MONTH, Disp: , Rfl: 10  •  ALPRAZolam (XANAX) 0.25 MG Tab, Take 1 Tab by mouth 1 time daily as needed for Anxiety (with flying) for up to 10 days., Disp: 10 Tab, Rfl: 0  •  sertraline (ZOLOFT) 50 MG Tab, TAKE 1 TABLET BY MOUTH EVERY DAY, Disp: 90 Tab, Rfl: 3  •  aspirin EC (ECOTRIN) 81 MG Tablet Delayed Response, Take 81 mg by mouth every day., Disp: , Rfl:   •  hydroCHLOROthiazide (HYDRODIURIL) 25 MG Tab, TAKE 1 TABLET BY MOUTH EVERY DAY, Disp: 90 Tab, Rfl: 3  •  fluticasone (FLONASE) 50 MCG/ACT nasal spray, SPRAY 2 SPRAYS IN  "EACH NOSTRIL EVERY DAY, Disp: 1 Bottle, Rfl: 6  •  PROVENTIL  (90 Base) MCG/ACT Aero Soln inhalation aerosol, INHALE 2 PUFFS BY MOUTH EVERY 6 HOURS AS NEEDED, Disp: 20.1 g, Rfl: 3  •  cyanocobalamin (VITAMIN B-12) 100 MCG TABS, Take 100 mcg by mouth every day., Disp: , Rfl:   •  vitamin D (CHOLECALCIFEROL) 1000 UNIT TABS, Take 1,000 Units by mouth every day., Disp: , Rfl:   •  Cetirizine HCl (ZYRTEC PO), Take  by mouth., Disp: , Rfl:   •  Multiple Vitamins-Minerals (WOMENS MULTI VITAMIN & MINERAL PO), Take  by mouth., Disp: , Rfl:   •  albuterol 108 (90 Base) MCG/ACT Aero Soln inhalation aerosol, Inhale 2 Puffs by mouth every 6 hours as needed for Shortness of Breath., Disp: 8.5 g, Rfl: 3  •  albuterol (PROVENTIL) 2.5mg/3ml Nebu Soln solution for nebulization, INHALE 1 VIAL BY MOUTH VIA NEBULIZER EVERY 4 HOURS AS NEEDED FOR SHORTNESS OF BREATH, Disp: 75 mL, Rfl: 2    Allergies, past medical history, past surgical history, family history, social history reviewed and updated    Objective:     Vitals: /88   Pulse 68   Temp 36.6 °C (97.9 °F)   Resp 12   Ht 1.626 m (5' 4\")   Wt 113.4 kg (250 lb)   SpO2 94%   BMI 42.91 kg/m²   General:  Alert, pleasant, NAD  Eyes:  normal inspection of conjunctivae and lids, PERRLA, no icterus  ENMT:  External ears and nose are normal.  TMs visualized, Oropharynx non-erythematous, no exudates    Neck  supple  Heart:  Regular rate and rhythm  Respiratory:  Normal respiratory effortClear to auscultation bilaterally.  Abdomen:   soft, Non-distended, non tender,   Skin:  Warm, dryno rashesno jaundice  Musculoskeletal:  Normal gait  Neurological: No tremors,   Psych:   Affect/mood is normal, judgement is good, memory is intact, grooming is appropriate.    Assessment/Plan:     1. Essential hypertension  Chronic medical condition.  Currently well controlled with hydrochlorothiazide 25 mg daily.    2. Pure hypercholesterolemia  Chronic medical condition.  Currently not well " controlled.  We did discuss that her labs have been increasing over the last 2 years.  She will work on diet, exercise and weight loss.  She will follow-up with me in 4 months and recheck fasting lipid profile a week prior to that.  - Lipid Profile; Future    3. Anxiety  Chronic medical condition.  Stable with as needed Xanax.  - ALPRAZolam (XANAX) 0.25 MG Tab; Take 1 Tab by mouth 1 time daily as needed for Anxiety (with flying) for up to 10 days.  Dispense: 10 Tab; Refill: 0    4. Sedative, hypnotic or anxiolytic abuse, episodic (HCC)  Chronic diagnosis for her.  - Controlled Substance Treatment Agreement    5. Simple phobia  This is a chronic diagnosis for her.  She does have a fear of flying and utilizes Xanax 0.25 mg prior to that.  - ALPRAZolam (XANAX) 0.25 MG Tab; Take 1 Tab by mouth 1 time daily as needed for Anxiety (with flying) for up to 10 days.  Dispense: 10 Tab; Refill: 0    6. Anxiety  Chronic medical problem for her.  Currently stable with daily Zoloft and as needed Xanax.  - ALPRAZolam (XANAX) 0.25 MG Tab; Take 1 Tab by mouth 1 time daily as needed for Anxiety (with flying) for up to 10 days.  Dispense: 10 Tab; Refill: 0    7. Morbid obesity with BMI of 40.0-44.9, adult (HCC)  Chronic medical condition.  Her BMI has been stable.  She recently got an exercise bike for [employer] and will start utilizing it.  Follow-up with me in 4 months.      No Follow-up on file.

## 2019-01-28 NOTE — ASSESSMENT & PLAN NOTE
This is a chronic diagnosis for her.    She will re-signed a controlled substance contract today.

## 2019-01-28 NOTE — ASSESSMENT & PLAN NOTE
This is a chronic medical problem which is well controlled with As needed albuterol inhaler and Flonase.  She denies any coughing or wheezing.

## 2019-01-28 NOTE — ASSESSMENT & PLAN NOTE
This is a chronic medical problem for her.  BMI is 42.91 today.  She just recently bought an exercise bike and has started using it.  She also several times a week and will try to decrease that.

## 2019-01-28 NOTE — ASSESSMENT & PLAN NOTE
This is a chronic medical problem for which she has been utilizing Xanax 0.25 mg as needed.    She typically takes it for flying or with panic attacks.  She does mention that it has been a few years since she last had to use it.

## 2019-01-28 NOTE — ASSESSMENT & PLAN NOTE
This is a chronic medical problem which she is currently not on any statin.  She does mention that she was on a statin many years ago and had discontinued it due to some muscle aches.  Her labs were monitored and were fine off of the statin.  However over the last 2 years have been increasing.  Her most recent cholesterol panel from November 2018 shows total cholesterol 196, triglycerides 116, HDL 50, and .

## 2019-01-28 NOTE — ASSESSMENT & PLAN NOTE
This is a chronic medical problem which is well controlled with cHydrochlorothiazide 25 mg daily.  Her blood pressure today is 132/88.  She denies any headaches or chest pain.

## 2019-05-02 ENCOUNTER — HOSPITAL ENCOUNTER (OUTPATIENT)
Dept: LAB | Facility: MEDICAL CENTER | Age: 64
End: 2019-05-02
Attending: INTERNAL MEDICINE
Payer: COMMERCIAL

## 2019-05-02 LAB — TSH SERPL DL<=0.005 MIU/L-ACNC: 0.54 UIU/ML (ref 0.38–5.33)

## 2019-05-02 PROCEDURE — 36415 COLL VENOUS BLD VENIPUNCTURE: CPT

## 2019-05-02 PROCEDURE — 84439 ASSAY OF FREE THYROXINE: CPT

## 2019-05-02 PROCEDURE — 84443 ASSAY THYROID STIM HORMONE: CPT

## 2019-05-07 LAB — T4 FREE SERPL DIALY-MCNC: 1.5 NG/DL (ref 1.1–2.4)

## 2019-05-13 RX ORDER — ALBUTEROL SULFATE 2.5 MG/3ML
SOLUTION RESPIRATORY (INHALATION)
Qty: 75 ML | Refills: 0 | Status: SHIPPED | OUTPATIENT
Start: 2019-05-13 | End: 2020-05-27

## 2019-05-13 NOTE — TELEPHONE ENCOUNTER
Was the patient seen in the last year in this department? Yes LOV 01/28/19    Does patient have an active prescription for medications requested? No     Received Request Via: Pharmacy

## 2019-05-13 NOTE — TELEPHONE ENCOUNTER
Requested Prescriptions     Pending Prescriptions Disp Refills   • albuterol (PROVENTIL) 2.5mg/3ml Nebu Soln solution for nebulization [Pharmacy Med Name: ALBUTEROL 0.083%(2.5MG/3ML) 25X3ML] 75 mL 0     Sig: INHALE 1 VIAL BY MOUTH VIA NEBULIZER EVERY 4 HOURS AS NEEDED FOR SHORTNESS OF BREATH   Anne Ryan M.D.

## 2019-05-20 ENCOUNTER — OFFICE VISIT (OUTPATIENT)
Dept: URGENT CARE | Facility: PHYSICIAN GROUP | Age: 64
End: 2019-05-20
Payer: COMMERCIAL

## 2019-05-20 VITALS
WEIGHT: 250 LBS | HEART RATE: 83 BPM | BODY MASS INDEX: 42.68 KG/M2 | DIASTOLIC BLOOD PRESSURE: 76 MMHG | RESPIRATION RATE: 18 BRPM | OXYGEN SATURATION: 97 % | TEMPERATURE: 98 F | HEIGHT: 64 IN | SYSTOLIC BLOOD PRESSURE: 128 MMHG

## 2019-05-20 DIAGNOSIS — H92.02 OTALGIA, LEFT EAR: ICD-10-CM

## 2019-05-20 DIAGNOSIS — Z86.19 HISTORY OF CANDIDIASIS: ICD-10-CM

## 2019-05-20 DIAGNOSIS — J01.01 ACUTE RECURRENT MAXILLARY SINUSITIS: Primary | ICD-10-CM

## 2019-05-20 PROCEDURE — 99214 OFFICE O/P EST MOD 30 MIN: CPT | Performed by: PHYSICIAN ASSISTANT

## 2019-05-20 RX ORDER — DOXYCYCLINE HYCLATE 100 MG
100 TABLET ORAL 2 TIMES DAILY
Qty: 20 TAB | Refills: 0 | Status: SHIPPED | OUTPATIENT
Start: 2019-05-20 | End: 2019-05-30

## 2019-05-20 RX ORDER — FLUCONAZOLE 150 MG/1
150 TABLET ORAL DAILY
Qty: 2 TAB | Refills: 0 | Status: SHIPPED | OUTPATIENT
Start: 2019-05-20 | End: 2019-07-31

## 2019-05-20 NOTE — PROGRESS NOTES
Subjective:      Kiley Foss is a 63 y.o. female who presents with Sinus Problem (with coughing x 2 weeks )    PMH:  has a past medical history of Anxiety; ASTHMA; Dyslipidemia; GERD (gastroesophageal reflux disease); Hemorrhage; Hemorrhoids (11/12/15); Multiple thyroid nodules; Non-seasonal allergic rhinitis; Obesity -- 45 (7/27/2011); RAD (reactive airway disease) (7/1/2011); and Simple phobia. She also has no past medical history of Breast cancer (HCC).  MEDS:   Current Outpatient Prescriptions:   •  doxycycline (VIBRAMYCIN) 100 MG Tab, Take 1 Tab by mouth 2 times a day for 10 days., Disp: 20 Tab, Rfl: 0  •  fluconazole (DIFLUCAN) 150 MG tablet, Take 1 Tab by mouth every day., Disp: 2 Tab, Rfl: 0  •  sertraline (ZOLOFT) 50 MG Tab, TAKE 1 TABLET BY MOUTH EVERY DAY, Disp: 90 Tab, Rfl: 3  •  aspirin EC (ECOTRIN) 81 MG Tablet Delayed Response, Take 81 mg by mouth every day., Disp: , Rfl:   •  hydroCHLOROthiazide (HYDRODIURIL) 25 MG Tab, TAKE 1 TABLET BY MOUTH EVERY DAY, Disp: 90 Tab, Rfl: 3  •  fluticasone (FLONASE) 50 MCG/ACT nasal spray, SPRAY 2 SPRAYS IN EACH NOSTRIL EVERY DAY, Disp: 1 Bottle, Rfl: 6  •  cyanocobalamin (VITAMIN B-12) 100 MCG TABS, Take 100 mcg by mouth every day., Disp: , Rfl:   •  vitamin D (CHOLECALCIFEROL) 1000 UNIT TABS, Take 1,000 Units by mouth every day., Disp: , Rfl:   •  Cetirizine HCl (ZYRTEC PO), Take  by mouth., Disp: , Rfl:   •  albuterol (PROVENTIL) 2.5mg/3ml Nebu Soln solution for nebulization, INHALE 1 VIAL BY MOUTH VIA NEBULIZER EVERY 4 HOURS AS NEEDED FOR SHORTNESS OF BREATH, Disp: 75 mL, Rfl: 0  •  norethindrone (AYGESTIN) 5 MG tablet, TK 1 T PO D FOR THE FIRST 12 DAYS OF THE MONTH, Disp: , Rfl: 10  •  PROVENTIL  (90 Base) MCG/ACT Aero Soln inhalation aerosol, INHALE 2 PUFFS BY MOUTH EVERY 6 HOURS AS NEEDED, Disp: 20.1 g, Rfl: 3  •  Multiple Vitamins-Minerals (WOMENS MULTI VITAMIN & MINERAL PO), Take  by mouth., Disp: , Rfl:   ALLERGIES:   Allergies   Allergen  "Reactions   • Augmentin      Feels anxious   • Cephalexin Vomiting   • Pcn [Penicillins]    • Sulfa Drugs      SURGHX:   Past Surgical History:   Procedure Laterality Date   • BIOPSY GENERAL      thyroid    • SALPINGECTOMY      ectopic pregnancy   • SINUSOTOMIES     • TONSILLECTOMY     • TUBAL COAGULATION LAPAROSCOPIC BILATERAL       SOCHX:  reports that she has never smoked. She has never used smokeless tobacco. She reports that she drinks alcohol. She reports that she does not use drugs.  FH: Reviewed with patient, not pertinent to this visit.           Patient presents with:  Sinus Problem: sinus congestion, pain and pressure with coughing x 2 weeks . Pt has history of chronic and recurrent sinusitis, as well as history of sinus surgery.  Pt has been using otc allergy medicine, neti pot, flonase, sudafed and humidifier with little relief.           Sinus Problem   This is a new problem. The current episode started 1 to 4 weeks ago. The problem has been gradually worsening since onset. There has been no fever. Her pain is at a severity of 6/10. Associated symptoms include congestion, coughing, ear pain, headaches and sinus pressure. Pertinent negatives include no sore throat. Past treatments include oral decongestants and saline sprays (otc meds). The treatment provided no relief.       Review of Systems   Constitutional: Negative for fever.   HENT: Positive for congestion, ear pain, sinus pain and sinus pressure. Negative for sore throat.    Respiratory: Positive for cough. Negative for sputum production and wheezing.    Neurological: Positive for headaches.   All other systems reviewed and are negative.         Objective:     /76 (BP Location: Left arm, Patient Position: Sitting, BP Cuff Size: Large adult)   Pulse 83   Temp 36.7 °C (98 °F) (Temporal)   Resp 18   Ht 1.626 m (5' 4\")   Wt 113.4 kg (250 lb)   LMP 07/26/2012   SpO2 97%   BMI 42.91 kg/m²      Physical Exam   Constitutional: She is " oriented to person, place, and time. She appears well-developed and well-nourished. No distress.   HENT:   Head: Normocephalic and atraumatic.   Right Ear: Tympanic membrane normal.   Left Ear: A middle ear effusion (clear) is present.   Nose: Mucosal edema and rhinorrhea present. Right sinus exhibits maxillary sinus tenderness. Left sinus exhibits maxillary sinus tenderness.   Mouth/Throat: Uvula is midline and mucous membranes are normal. Posterior oropharyngeal erythema present. No oropharyngeal exudate or posterior oropharyngeal edema.   Eyes: Pupils are equal, round, and reactive to light. Conjunctivae and EOM are normal.   Neck: Normal range of motion. Neck supple.   Cardiovascular: Normal rate and regular rhythm.    Pulmonary/Chest: Effort normal and breath sounds normal. No respiratory distress.   Abdominal: Soft.   Musculoskeletal: Normal range of motion.   Lymphadenopathy:     She has no cervical adenopathy.   Neurological: She is alert and oriented to person, place, and time.   Skin: Skin is warm and dry. Capillary refill takes less than 2 seconds.   Psychiatric: She has a normal mood and affect.   Nursing note and vitals reviewed.       Assessment/Plan:     1. Acute recurrent maxillary sinusitis  doxycycline (VIBRAMYCIN) 100 MG Tab    fluconazole (DIFLUCAN) 150 MG tablet   2. Otalgia, left ear  doxycycline (VIBRAMYCIN) 100 MG Tab    fluconazole (DIFLUCAN) 150 MG tablet   3. History of candidiasis  fluconazole (DIFLUCAN) 150 MG tablet     PT can continue OTC medications, increase fluids and rest until symptoms improve.     PT should follow up with PCP in 1-2 days for re-evaluation if symptoms have not improved.     Discussed red flags and reasons to return to UC or ED.  Pt and/or family verbalized understanding of diagnosis and follow up instructions and was offered informational handout on diagnosis.  PT discharged.

## 2019-05-21 ASSESSMENT — ENCOUNTER SYMPTOMS
SINUS PAIN: 1
WHEEZING: 0
COUGH: 1
SINUS PRESSURE: 1
FEVER: 0
HEADACHES: 1
SORE THROAT: 0
SPUTUM PRODUCTION: 0

## 2019-05-24 DIAGNOSIS — J20.9 ACUTE BRONCHITIS, UNSPECIFIED ORGANISM: ICD-10-CM

## 2019-05-24 RX ORDER — PREDNISONE 20 MG/1
TABLET ORAL
Qty: 10 TAB | Refills: 0 | OUTPATIENT
Start: 2019-05-24

## 2019-05-24 NOTE — TELEPHONE ENCOUNTER
Please let patient know that she needs to be evaluated by a provider before receiving steroids and/ or antibiotics.

## 2019-05-24 NOTE — TELEPHONE ENCOUNTER
Was the patient seen in the last year in this department? Yes    Does patient have an active prescription for medications requested? No     Received Request Via: Patient     Pt has sinus infection and the last time she took the antibiotic as well as the steroid to get rid of it

## 2019-05-25 ENCOUNTER — OFFICE VISIT (OUTPATIENT)
Dept: URGENT CARE | Facility: PHYSICIAN GROUP | Age: 64
End: 2019-05-25
Payer: COMMERCIAL

## 2019-05-25 VITALS
HEART RATE: 75 BPM | HEIGHT: 64 IN | WEIGHT: 258 LBS | OXYGEN SATURATION: 95 % | DIASTOLIC BLOOD PRESSURE: 78 MMHG | TEMPERATURE: 98.7 F | SYSTOLIC BLOOD PRESSURE: 128 MMHG | RESPIRATION RATE: 16 BRPM | BODY MASS INDEX: 44.05 KG/M2

## 2019-05-25 DIAGNOSIS — J45.20 MILD INTERMITTENT REACTIVE AIRWAY DISEASE WITHOUT COMPLICATION: ICD-10-CM

## 2019-05-25 DIAGNOSIS — J30.89 NON-SEASONAL ALLERGIC RHINITIS, UNSPECIFIED TRIGGER: ICD-10-CM

## 2019-05-25 PROCEDURE — 99203 OFFICE O/P NEW LOW 30 MIN: CPT | Performed by: EMERGENCY MEDICINE

## 2019-05-25 RX ORDER — PREDNISONE 20 MG/1
40 TABLET ORAL DAILY
Qty: 10 TAB | Refills: 0 | Status: SHIPPED | OUTPATIENT
Start: 2019-05-25 | End: 2019-05-30

## 2019-05-25 ASSESSMENT — ENCOUNTER SYMPTOMS
COUGH: 1
VOMITING: 0
SINUS PAIN: 1
SINUS PRESSURE: 1
WHEEZING: 0
DIARRHEA: 0
HEARTBURN: 0
SHORTNESS OF BREATH: 0
HEMOPTYSIS: 0
SORE THROAT: 0

## 2019-05-25 NOTE — PROGRESS NOTES
Subjective:      Kiley Foss is a 63 y.o. female who presents with Sinus Problem (ongoing sinus pressure & drainage, cough was seen 05/20/19 on antibiotics for 5 days not getting better)            Sinus Problem   This is a recurrent problem. Episode onset: 3 weeks. The problem has been waxing and waning since onset. There has been no fever. The pain is mild. Associated symptoms include congestion, coughing and sinus pressure. Pertinent negatives include no ear pain, shortness of breath or sore throat. Treatments tried: doxycycline. The treatment provided mild relief.       Review of Systems   HENT: Positive for congestion, sinus pain and sinus pressure. Negative for ear discharge, ear pain, hearing loss, nosebleeds and sore throat.    Respiratory: Positive for cough. Negative for hemoptysis, shortness of breath and wheezing.    Gastrointestinal: Negative for diarrhea, heartburn and vomiting.   Endo/Heme/Allergies: Positive for environmental allergies.     PMH:  has a past medical history of Anxiety; ASTHMA; Dyslipidemia; GERD (gastroesophageal reflux disease); Hemorrhage; Hemorrhoids (11/12/15); Multiple thyroid nodules; Non-seasonal allergic rhinitis; Obesity -- 45 (7/27/2011); RAD (reactive airway disease) (7/1/2011); and Simple phobia. She also has no past medical history of Breast cancer (HCC).  MEDS:   Current Outpatient Prescriptions:   •  predniSONE (DELTASONE) 20 MG Tab, Take 2 Tabs by mouth every day for 5 days., Disp: 10 Tab, Rfl: 0  •  doxycycline (VIBRAMYCIN) 100 MG Tab, Take 1 Tab by mouth 2 times a day for 10 days., Disp: 20 Tab, Rfl: 0  •  norethindrone (AYGESTIN) 5 MG tablet, TK 1 T PO D FOR THE FIRST 12 DAYS OF THE MONTH, Disp: , Rfl: 10  •  sertraline (ZOLOFT) 50 MG Tab, TAKE 1 TABLET BY MOUTH EVERY DAY, Disp: 90 Tab, Rfl: 3  •  aspirin EC (ECOTRIN) 81 MG Tablet Delayed Response, Take 81 mg by mouth every day., Disp: , Rfl:   •  hydroCHLOROthiazide (HYDRODIURIL) 25 MG Tab, TAKE 1 TABLET BY  "MOUTH EVERY DAY, Disp: 90 Tab, Rfl: 3  •  fluticasone (FLONASE) 50 MCG/ACT nasal spray, SPRAY 2 SPRAYS IN EACH NOSTRIL EVERY DAY, Disp: 1 Bottle, Rfl: 6  •  PROVENTIL  (90 Base) MCG/ACT Aero Soln inhalation aerosol, INHALE 2 PUFFS BY MOUTH EVERY 6 HOURS AS NEEDED, Disp: 20.1 g, Rfl: 3  •  cyanocobalamin (VITAMIN B-12) 100 MCG TABS, Take 100 mcg by mouth every day., Disp: , Rfl:   •  vitamin D (CHOLECALCIFEROL) 1000 UNIT TABS, Take 1,000 Units by mouth every day., Disp: , Rfl:   •  Cetirizine HCl (ZYRTEC PO), Take  by mouth., Disp: , Rfl:   •  Multiple Vitamins-Minerals (WOMENS MULTI VITAMIN & MINERAL PO), Take  by mouth., Disp: , Rfl:   •  fluconazole (DIFLUCAN) 150 MG tablet, Take 1 Tab by mouth every day., Disp: 2 Tab, Rfl: 0  •  albuterol (PROVENTIL) 2.5mg/3ml Nebu Soln solution for nebulization, INHALE 1 VIAL BY MOUTH VIA NEBULIZER EVERY 4 HOURS AS NEEDED FOR SHORTNESS OF BREATH, Disp: 75 mL, Rfl: 0  ALLERGIES:   Allergies   Allergen Reactions   • Augmentin      Feels anxious   • Cephalexin Vomiting   • Pcn [Penicillins]    • Sulfa Drugs      SURGHX:   Past Surgical History:   Procedure Laterality Date   • BIOPSY GENERAL      thyroid    • SALPINGECTOMY      ectopic pregnancy   • SINUSOTOMIES     • TONSILLECTOMY     • TUBAL COAGULATION LAPAROSCOPIC BILATERAL       SOCHX:  reports that she has never smoked. She has never used smokeless tobacco. She reports that she drinks alcohol. She reports that she does not use drugs.  FH: family history includes Cancer in her brother, father, and sister; Diabetes in her mother; Heart Disease in her maternal grandfather and paternal grandfather; Hypertension in her mother; Stroke in her maternal grandfather and paternal grandfather.       Objective:     /78 (BP Location: Left arm, Patient Position: Sitting, BP Cuff Size: Adult)   Pulse 75   Temp 37.1 °C (98.7 °F) (Temporal)   Resp 16   Ht 1.626 m (5' 4\")   Wt 117 kg (258 lb)   LMP 07/26/2012   SpO2 95%   " BMI 44.29 kg/m²      Physical Exam   Constitutional: She is oriented to person, place, and time. She appears well-developed and well-nourished. She is cooperative.  Non-toxic appearance. She does not appear ill. No distress.   HENT:   Head: Normocephalic.   Right Ear: Tympanic membrane and ear canal normal.   Left Ear: Tympanic membrane and ear canal normal.   Nose: Mucosal edema and rhinorrhea present.   Mouth/Throat: Uvula is midline. No trismus in the jaw. No uvula swelling. Oropharyngeal exudate present. No posterior oropharyngeal edema or posterior oropharyngeal erythema.   Eyes: Conjunctivae are normal.   Neck: Trachea normal. Neck supple.   Cardiovascular: Normal rate, regular rhythm and normal heart sounds.    Pulmonary/Chest: Effort normal. She has no wheezes. She has no rhonchi. She has no rales.   Lymphadenopathy:     She has no cervical adenopathy.   Neurological: She is alert and oriented to person, place, and time.   Skin: Skin is warm and dry.   Psychiatric: She has a normal mood and affect.          Advised finish course of doxycycline     Assessment/Plan:     1. Non-seasonal allergic rhinitis, unspecified trigger  Continue saline rinse, inhaled nasal steroid  - predniSONE (DELTASONE) 20 MG Tab; Take 2 Tabs by mouth every day for 5 days.  Dispense: 10 Tab; Refill: 0    2. Mild intermittent reactive airway disease without complication  Use albuterol with spacer as needed  - predniSONE (DELTASONE) 20 MG Tab; Take 2 Tabs by mouth every day for 5 days.  Dispense: 10 Tab; Refill: 0

## 2019-06-06 ENCOUNTER — HOSPITAL ENCOUNTER (OUTPATIENT)
Dept: RADIOLOGY | Facility: MEDICAL CENTER | Age: 64
End: 2019-06-06
Attending: INTERNAL MEDICINE
Payer: COMMERCIAL

## 2019-06-06 DIAGNOSIS — E04.2 NONTOXIC MULTINODULAR GOITER: ICD-10-CM

## 2019-06-06 PROCEDURE — 78014 THYROID IMAGING W/BLOOD FLOW: CPT

## 2019-07-26 ENCOUNTER — HOSPITAL ENCOUNTER (OUTPATIENT)
Dept: LAB | Facility: MEDICAL CENTER | Age: 64
End: 2019-07-26
Attending: FAMILY MEDICINE
Payer: COMMERCIAL

## 2019-07-26 DIAGNOSIS — E78.00 PURE HYPERCHOLESTEROLEMIA: ICD-10-CM

## 2019-07-26 LAB
CHOLEST SERPL-MCNC: 197 MG/DL (ref 100–199)
FASTING STATUS PATIENT QL REPORTED: NORMAL
HDLC SERPL-MCNC: 49 MG/DL
LDLC SERPL CALC-MCNC: 122 MG/DL
TRIGL SERPL-MCNC: 132 MG/DL (ref 0–149)

## 2019-07-26 PROCEDURE — 80061 LIPID PANEL: CPT

## 2019-07-26 PROCEDURE — 36415 COLL VENOUS BLD VENIPUNCTURE: CPT

## 2019-07-31 ENCOUNTER — OFFICE VISIT (OUTPATIENT)
Dept: MEDICAL GROUP | Facility: PHYSICIAN GROUP | Age: 64
End: 2019-07-31
Payer: COMMERCIAL

## 2019-07-31 VITALS
DIASTOLIC BLOOD PRESSURE: 60 MMHG | TEMPERATURE: 98.6 F | SYSTOLIC BLOOD PRESSURE: 118 MMHG | HEART RATE: 80 BPM | RESPIRATION RATE: 12 BRPM | BODY MASS INDEX: 44.39 KG/M2 | HEIGHT: 64 IN | OXYGEN SATURATION: 94 % | WEIGHT: 260 LBS

## 2019-07-31 DIAGNOSIS — Z01.84 IMMUNITY STATUS TESTING: ICD-10-CM

## 2019-07-31 DIAGNOSIS — E04.2 MULTIPLE THYROID NODULES: ICD-10-CM

## 2019-07-31 DIAGNOSIS — I10 ESSENTIAL HYPERTENSION: ICD-10-CM

## 2019-07-31 DIAGNOSIS — E78.00 PURE HYPERCHOLESTEROLEMIA: ICD-10-CM

## 2019-07-31 PROCEDURE — 99214 OFFICE O/P EST MOD 30 MIN: CPT | Performed by: FAMILY MEDICINE

## 2019-07-31 RX ORDER — PREDNISONE 10 MG/1
TABLET ORAL
Refills: 0 | COMMUNITY
Start: 2019-06-10 | End: 2019-07-31

## 2019-07-31 RX ORDER — AZELASTINE 1 MG/ML
1 SPRAY, METERED NASAL DAILY
Refills: 12 | COMMUNITY
Start: 2019-05-23 | End: 2021-05-14

## 2019-07-31 RX ORDER — DOXYCYCLINE HYCLATE 100 MG/1
CAPSULE ORAL
Refills: 0 | COMMUNITY
Start: 2019-06-24 | End: 2019-07-31

## 2019-07-31 SDOH — HEALTH STABILITY: MENTAL HEALTH: HOW OFTEN DO YOU HAVE 6 OR MORE DRINKS ON ONE OCCASION?: NEVER

## 2019-07-31 SDOH — HEALTH STABILITY: MENTAL HEALTH: HOW OFTEN DO YOU HAVE A DRINK CONTAINING ALCOHOL?: MONTHLY OR LESS

## 2019-07-31 SDOH — HEALTH STABILITY: MENTAL HEALTH: HOW MANY STANDARD DRINKS CONTAINING ALCOHOL DO YOU HAVE ON A TYPICAL DAY?: 1 OR 2

## 2019-07-31 NOTE — PROGRESS NOTES
cc: Hypercholesterolemia    Subjective:     Kiley Foss is a 63 y.o. female presenting for follow-up of her chronic medical problems and to discuss her recent lab results.    1. Pure hypercholesterolemia  Problem.  Currently not on any medications.  Most recent labs  From last week do show n total cholesterol 197, triglycerides 132, HDL 49, and .  She is gained approximately 10 pounds over the last few months.  Mentions that she was sick with sinus infection and completed a course of steroids.  Has not been exercising as much.    2. Essential hypertension  Chronic medical diagnosis.  Current blood pressure is 118/60.  Currently taking hydrochlorothiazide 25 mg daily.    3. Multiple thyroid nodules  Chronic medical diagnosis.  Continues to follow-up with endocrine, Dr. Collins for this.  She recently had a thyroid uptake scan in June.  Results showed normal iodine uptake.  And has a follow-up with Dr. Collins in December.    4. Immunity status testing  Is wondering if she is immune to measles.  Does not recall receiving the MMR vaccine.      Review of systems:  See above and negative for fever chills, chest pain or shortness of breath.  Allergies   Allergen Reactions   • Augmentin      Feels anxious   • Cephalexin Vomiting   • Pcn [Penicillins]    • Sulfa Drugs          Current Outpatient Medications:   •  azelastine (ASTELIN) 137 MCG/SPRAY nasal spray, SPRAY 1 SPRAY IN EACH NOSTRIL BID, Disp: , Rfl: 12  •  albuterol (PROVENTIL) 2.5mg/3ml Nebu Soln solution for nebulization, INHALE 1 VIAL BY MOUTH VIA NEBULIZER EVERY 4 HOURS AS NEEDED FOR SHORTNESS OF BREATH, Disp: 75 mL, Rfl: 0  •  sertraline (ZOLOFT) 50 MG Tab, TAKE 1 TABLET BY MOUTH EVERY DAY, Disp: 90 Tab, Rfl: 3  •  aspirin EC (ECOTRIN) 81 MG Tablet Delayed Response, Take 81 mg by mouth every day., Disp: , Rfl:   •  hydroCHLOROthiazide (HYDRODIURIL) 25 MG Tab, TAKE 1 TABLET BY MOUTH EVERY DAY, Disp: 90 Tab, Rfl: 3  •  fluticasone (FLONASE) 50  "MCG/ACT nasal spray, SPRAY 2 SPRAYS IN EACH NOSTRIL EVERY DAY, Disp: 1 Bottle, Rfl: 6  •  PROVENTIL  (90 Base) MCG/ACT Aero Soln inhalation aerosol, INHALE 2 PUFFS BY MOUTH EVERY 6 HOURS AS NEEDED, Disp: 20.1 g, Rfl: 3  •  cyanocobalamin (VITAMIN B-12) 100 MCG TABS, Take 100 mcg by mouth every day., Disp: , Rfl:   •  vitamin D (CHOLECALCIFEROL) 1000 UNIT TABS, Take 1,000 Units by mouth every day., Disp: , Rfl:   •  Cetirizine HCl (ZYRTEC PO), Take  by mouth., Disp: , Rfl:   •  Multiple Vitamins-Minerals (WOMENS MULTI VITAMIN & MINERAL PO), Take  by mouth., Disp: , Rfl:     Allergies, past medical history, past surgical history, family history, social history reviewed and updated    Objective:     Vitals: /60 (BP Location: Left arm, Patient Position: Sitting, BP Cuff Size: Adult long)   Pulse 80   Temp 37 °C (98.6 °F) (Temporal)   Resp 12   Ht 1.626 m (5' 4\")   Wt 117.9 kg (260 lb)   LMP 07/26/2012   SpO2 94%   BMI 44.63 kg/m²   General:  Alert, pleasant, NAD  Eyes:  normal inspection of conjunctivae and lids, EOMI,   ENMT:  External ears and nose are normal.    Neck  supple,   Heart:  Regular rate and rhythm,  No LE edema  Respiratory:  Normal respiratory effort, Clear to auscultation bilaterally.  Abdomen:   soft, Non-distended,   Skin:  Warm, dry, no rashes,   Musculoskeletal:  Normal gait, Normal digits and nails.  Neurological: No tremors,   Psych:   Affect/mood is normal, judgement is good, memory is intact, grooming is appropriate.    Assessment/Plan:     Kiley was seen today for follow-up, skin discoloration and results.    Diagnoses and all orders for this visit:    Pure hypercholesterolemia  Chronic.  Stable.  Encouraged to make lifestyle changes and we will recheck these labs in 3 months.  -     Lipid Profile; Future    Essential hypertension  Chronic.  Stable.  Continue with hydrochlorothiazide 25 mg daily.    Multiple thyroid nodules  Chronic.  Stable.  Continue to follow-up with " Dr. Collins.    Immunity status testing  -     RUBEOLA IGG AB; Future          Return in about 3 months (around 10/31/2019) for hyperlipidemia.

## 2019-08-14 ENCOUNTER — OFFICE VISIT (OUTPATIENT)
Dept: MEDICAL GROUP | Facility: PHYSICIAN GROUP | Age: 64
End: 2019-08-14
Payer: COMMERCIAL

## 2019-08-14 ENCOUNTER — HOSPITAL ENCOUNTER (OUTPATIENT)
Dept: LAB | Facility: MEDICAL CENTER | Age: 64
End: 2019-08-14
Attending: FAMILY MEDICINE
Payer: COMMERCIAL

## 2019-08-14 VITALS
BODY MASS INDEX: 44.05 KG/M2 | WEIGHT: 258 LBS | OXYGEN SATURATION: 97 % | RESPIRATION RATE: 20 BRPM | DIASTOLIC BLOOD PRESSURE: 82 MMHG | TEMPERATURE: 97.7 F | HEIGHT: 64 IN | SYSTOLIC BLOOD PRESSURE: 142 MMHG | HEART RATE: 80 BPM

## 2019-08-14 DIAGNOSIS — E04.2 MULTIPLE THYROID NODULES: ICD-10-CM

## 2019-08-14 DIAGNOSIS — I10 ESSENTIAL HYPERTENSION: ICD-10-CM

## 2019-08-14 DIAGNOSIS — R00.2 HEART PALPITATIONS: ICD-10-CM

## 2019-08-14 LAB
ALBUMIN SERPL BCP-MCNC: 4.6 G/DL (ref 3.2–4.9)
ALBUMIN/GLOB SERPL: 1.5 G/DL
ALP SERPL-CCNC: 65 U/L (ref 30–99)
ALT SERPL-CCNC: 29 U/L (ref 2–50)
ANION GAP SERPL CALC-SCNC: 9 MMOL/L (ref 0–11.9)
AST SERPL-CCNC: 25 U/L (ref 12–45)
BASOPHILS # BLD AUTO: 0.8 % (ref 0–1.8)
BASOPHILS # BLD: 0.06 K/UL (ref 0–0.12)
BILIRUB SERPL-MCNC: 1.9 MG/DL (ref 0.1–1.5)
BUN SERPL-MCNC: 21 MG/DL (ref 8–22)
CALCIUM SERPL-MCNC: 10.1 MG/DL (ref 8.5–10.5)
CHLORIDE SERPL-SCNC: 103 MMOL/L (ref 96–112)
CO2 SERPL-SCNC: 29 MMOL/L (ref 20–33)
CREAT SERPL-MCNC: 0.87 MG/DL (ref 0.5–1.4)
EOSINOPHIL # BLD AUTO: 0.09 K/UL (ref 0–0.51)
EOSINOPHIL NFR BLD: 1.3 % (ref 0–6.9)
ERYTHROCYTE [DISTWIDTH] IN BLOOD BY AUTOMATED COUNT: 45 FL (ref 35.9–50)
FASTING STATUS PATIENT QL REPORTED: NORMAL
GLOBULIN SER CALC-MCNC: 3 G/DL (ref 1.9–3.5)
GLUCOSE SERPL-MCNC: 84 MG/DL (ref 65–99)
HCT VFR BLD AUTO: 50.9 % (ref 37–47)
HGB BLD-MCNC: 16.1 G/DL (ref 12–16)
IMM GRANULOCYTES # BLD AUTO: 0.03 K/UL (ref 0–0.11)
IMM GRANULOCYTES NFR BLD AUTO: 0.4 % (ref 0–0.9)
LYMPHOCYTES # BLD AUTO: 2.18 K/UL (ref 1–4.8)
LYMPHOCYTES NFR BLD: 30.4 % (ref 22–41)
MCH RBC QN AUTO: 29.1 PG (ref 27–33)
MCHC RBC AUTO-ENTMCNC: 31.6 G/DL (ref 33.6–35)
MCV RBC AUTO: 91.9 FL (ref 81.4–97.8)
MONOCYTES # BLD AUTO: 0.66 K/UL (ref 0–0.85)
MONOCYTES NFR BLD AUTO: 9.2 % (ref 0–13.4)
NEUTROPHILS # BLD AUTO: 4.15 K/UL (ref 2–7.15)
NEUTROPHILS NFR BLD: 57.9 % (ref 44–72)
NRBC # BLD AUTO: 0 K/UL
NRBC BLD-RTO: 0 /100 WBC
PLATELET # BLD AUTO: 243 K/UL (ref 164–446)
PMV BLD AUTO: 10.9 FL (ref 9–12.9)
POTASSIUM SERPL-SCNC: 3.9 MMOL/L (ref 3.6–5.5)
PROT SERPL-MCNC: 7.6 G/DL (ref 6–8.2)
RBC # BLD AUTO: 5.54 M/UL (ref 4.2–5.4)
SODIUM SERPL-SCNC: 141 MMOL/L (ref 135–145)
WBC # BLD AUTO: 7.2 K/UL (ref 4.8–10.8)

## 2019-08-14 PROCEDURE — 99214 OFFICE O/P EST MOD 30 MIN: CPT | Performed by: FAMILY MEDICINE

## 2019-08-14 PROCEDURE — 80053 COMPREHEN METABOLIC PANEL: CPT

## 2019-08-14 PROCEDURE — 85025 COMPLETE CBC W/AUTO DIFF WBC: CPT

## 2019-08-14 PROCEDURE — 36415 COLL VENOUS BLD VENIPUNCTURE: CPT

## 2019-08-14 PROCEDURE — 84439 ASSAY OF FREE THYROXINE: CPT

## 2019-08-14 PROCEDURE — 84443 ASSAY THYROID STIM HORMONE: CPT

## 2019-08-14 PROCEDURE — 93000 ELECTROCARDIOGRAM COMPLETE: CPT | Performed by: FAMILY MEDICINE

## 2019-08-14 ASSESSMENT — PATIENT HEALTH QUESTIONNAIRE - PHQ9: CLINICAL INTERPRETATION OF PHQ2 SCORE: 0

## 2019-08-14 NOTE — PROGRESS NOTES
cc: Heart flutter    Subjective:     Kiley Foss is a 63 y.o. female presenting for further evaluation of her new onset of heart flutter.    1. Heart palpitations  This is a new medical problem which started few weeks ago.  She says that onset was less than a month ago.  She has noticed which she describes as vibrations to her heart several times a day.  There is no chest pain, no nausea no diaphoresis no lightheadedness associated with this.  She has not changed any amount of caffeine which she drinks.  Denies any changes to her baseline shortness of breath.  She had a follow-up with Dr. Collins recently and labs were checked for her thyroid in May which were within normal range.  Continues to take an aspirin.  She mentions that she had multiple episodes yesterday evening and even in the exam room today.    2. Essential hypertension  Chronic medical diagnosis.  Currently taking hydrochlorothiazide 25 mg daily.  Has not been checking her blood pressure at home as it has been in the normal range in the past.  However in the office it is elevated at 150/86 today.  She does have a blood pressure cuff at home will start doing that.    3. Multiple thyroid nodules  Chronic medical diagnosis for which she continues to follow-up with endocrine, Dr. Mitzi Collins.  Recent labs from May 2019 were reviewed.  We will go ahead and recheck these.  She denies any tremor.      Review of systems:  See above and negative for fever chills or anxiety  Allergies   Allergen Reactions   • Augmentin      Feels anxious   • Cephalexin Vomiting   • Pcn [Penicillins]    • Sulfa Drugs          Current Outpatient Medications:   •  azelastine (ASTELIN) 137 MCG/SPRAY nasal spray, SPRAY 1 SPRAY IN EACH NOSTRIL BID, Disp: , Rfl: 12  •  albuterol (PROVENTIL) 2.5mg/3ml Nebu Soln solution for nebulization, INHALE 1 VIAL BY MOUTH VIA NEBULIZER EVERY 4 HOURS AS NEEDED FOR SHORTNESS OF BREATH, Disp: 75 mL, Rfl: 0  •  sertraline (ZOLOFT) 50 MG Tab,  "TAKE 1 TABLET BY MOUTH EVERY DAY, Disp: 90 Tab, Rfl: 3  •  aspirin EC (ECOTRIN) 81 MG Tablet Delayed Response, Take 81 mg by mouth every day., Disp: , Rfl:   •  hydroCHLOROthiazide (HYDRODIURIL) 25 MG Tab, TAKE 1 TABLET BY MOUTH EVERY DAY, Disp: 90 Tab, Rfl: 3  •  fluticasone (FLONASE) 50 MCG/ACT nasal spray, SPRAY 2 SPRAYS IN EACH NOSTRIL EVERY DAY, Disp: 1 Bottle, Rfl: 6  •  PROVENTIL  (90 Base) MCG/ACT Aero Soln inhalation aerosol, INHALE 2 PUFFS BY MOUTH EVERY 6 HOURS AS NEEDED, Disp: 20.1 g, Rfl: 3  •  cyanocobalamin (VITAMIN B-12) 100 MCG TABS, Take 100 mcg by mouth every day., Disp: , Rfl:   •  vitamin D (CHOLECALCIFEROL) 1000 UNIT TABS, Take 1,000 Units by mouth every day., Disp: , Rfl:   •  Cetirizine HCl (ZYRTEC PO), Take  by mouth., Disp: , Rfl:   •  Multiple Vitamins-Minerals (WOMENS MULTI VITAMIN & MINERAL PO), Take  by mouth., Disp: , Rfl:     Allergies, past medical history, past surgical history, family history, social history reviewed and updated    Objective:     Vitals: /82 (BP Location: Left arm, Patient Position: Sitting, BP Cuff Size: Adult long)   Pulse 80   Temp 36.5 °C (97.7 °F) (Temporal)   Resp 20   Ht 1.626 m (5' 4\")   Wt 117 kg (258 lb)   LMP 07/26/2012   SpO2 97%   BMI 44.29 kg/m²   General:  Alert, pleasant, NAD  Eyes:  normal inspection of conjunctivae and lids, EOMI,   ENMT:  External ears and nose are normal.    Neck  supple,   Heart:  Regular rate and rhythm,  No LE edema  Respiratory:  Normal respiratory effort, Clear to auscultation bilaterally.  Abdomen:   soft, Non-distended,   Skin:  Warm, dry, no rashes,   Musculoskeletal:  Normal gait, Normal digits and nails.  Neurological: No tremors,   Psych:   Affect/mood is normal, judgement is good, memory is intact, grooming is appropriate.    Assessment/Plan:     Kiley was seen today for heart problem.    Diagnoses and all orders for this visit:    Heart palpitations  New medical problem.  EKG done in the " office reviewed with the patient.  Sinus rhythm with probable LVH.  -     EKG  -     EC-ECHOCARDIOGRAM COMPLETE W/O CONT; Future  -     REFERRAL TO CARDIOLOGY    Essential hypertension  Chronic medical problem.  Blood pressure is currently elevated systolic in the 150s.  Encouraged to start checking blood pressure at home and update me.  Discussed with her that we may need to titrate her dose of medication.  -     CBC WITH DIFFERENTIAL; Future  -     Comp Metabolic Panel; Future    Multiple thyroid nodules  Chronic diagnosis.  Stable per laboratory work.  -     FREE THYROXINE; Future  -     TSH; Future          Return in about 4 weeks (around 9/11/2019) for palpitations.

## 2019-08-15 LAB
T4 FREE SERPL-MCNC: 0.91 NG/DL (ref 0.53–1.43)
TSH SERPL DL<=0.005 MIU/L-ACNC: 0.63 UIU/ML (ref 0.38–5.33)

## 2019-08-16 ENCOUNTER — PATIENT MESSAGE (OUTPATIENT)
Dept: MEDICAL GROUP | Facility: PHYSICIAN GROUP | Age: 64
End: 2019-08-16

## 2019-08-19 ENCOUNTER — TELEPHONE (OUTPATIENT)
Dept: MEDICAL GROUP | Facility: PHYSICIAN GROUP | Age: 64
End: 2019-08-19

## 2019-08-19 DIAGNOSIS — R92.30 DENSE BREAST: ICD-10-CM

## 2019-08-19 DIAGNOSIS — R92.2 DENSE BREAST: ICD-10-CM

## 2019-08-19 NOTE — TELEPHONE ENCOUNTER
2. SPECIFIC Action To Be Taken: Ultra Sound Order  US-SCREENING WHOLE BREAST BILATERAL Prevoius image done on 06/15/18       3. Diagnosis/Clinical Reason for Request: For  Dense Breast    4. Specialty & Provider Name/Lab/Imaging Location: [facility]     5. Is appointment scheduled for requested order/referral: no    Patient was informed they will receive a return phone call from the office ONLY if there are any questions before processing their request. Advised to call back if they haven't received a call from the referral department in 5 days.

## 2019-08-21 ENCOUNTER — HOSPITAL ENCOUNTER (OUTPATIENT)
Dept: CARDIOLOGY | Facility: MEDICAL CENTER | Age: 64
End: 2019-08-21
Attending: FAMILY MEDICINE
Payer: COMMERCIAL

## 2019-08-21 DIAGNOSIS — R00.2 HEART PALPITATIONS: ICD-10-CM

## 2019-08-21 LAB
LV EJECT FRACT  99904: 65
LV EJECT FRACT MOD 2C 99903: 70.24
LV EJECT FRACT MOD 4C 99902: 58.52
LV EJECT FRACT MOD BP 99901: 65.71

## 2019-08-21 PROCEDURE — 93306 TTE W/DOPPLER COMPLETE: CPT

## 2019-08-21 PROCEDURE — 93306 TTE W/DOPPLER COMPLETE: CPT | Mod: 26 | Performed by: INTERNAL MEDICINE

## 2019-09-10 DIAGNOSIS — I10 ESSENTIAL HYPERTENSION: ICD-10-CM

## 2019-09-11 RX ORDER — HYDROCHLOROTHIAZIDE 25 MG/1
TABLET ORAL
Qty: 90 TAB | Refills: 2 | Status: SHIPPED | OUTPATIENT
Start: 2019-09-11 | End: 2020-05-29

## 2019-09-11 NOTE — TELEPHONE ENCOUNTER
Requested Prescriptions     Pending Prescriptions Disp Refills   • hydroCHLOROthiazide (HYDRODIURIL) 25 MG Tab [Pharmacy Med Name: HYDROCHLOROTHIAZIDE 25MG TABLETS] 90 Tab 2     Sig: TAKE 1 TABLET BY MOUTH EVERY DAY   Anne Ryan M.D.

## 2019-09-16 DIAGNOSIS — E80.6 HYPERBILIRUBINEMIA: ICD-10-CM

## 2019-09-18 DIAGNOSIS — R00.2 HEART PALPITATIONS: ICD-10-CM

## 2019-09-19 ENCOUNTER — HOSPITAL ENCOUNTER (OUTPATIENT)
Dept: LAB | Facility: MEDICAL CENTER | Age: 64
End: 2019-09-19
Attending: FAMILY MEDICINE
Payer: COMMERCIAL

## 2019-09-19 DIAGNOSIS — R00.2 HEART PALPITATIONS: ICD-10-CM

## 2019-09-19 DIAGNOSIS — Z01.84 IMMUNITY STATUS TESTING: ICD-10-CM

## 2019-09-19 DIAGNOSIS — E80.6 HYPERBILIRUBINEMIA: ICD-10-CM

## 2019-09-19 LAB
ALBUMIN SERPL BCP-MCNC: 4.8 G/DL (ref 3.2–4.9)
ALBUMIN/GLOB SERPL: 2.1 G/DL
ALP SERPL-CCNC: 66 U/L (ref 30–99)
ALT SERPL-CCNC: 21 U/L (ref 2–50)
ANION GAP SERPL CALC-SCNC: 11 MMOL/L (ref 0–11.9)
AST SERPL-CCNC: 20 U/L (ref 12–45)
BASOPHILS # BLD AUTO: 1 % (ref 0–1.8)
BASOPHILS # BLD: 0.07 K/UL (ref 0–0.12)
BILIRUB SERPL-MCNC: 2.1 MG/DL (ref 0.1–1.5)
BUN SERPL-MCNC: 22 MG/DL (ref 8–22)
CALCIUM SERPL-MCNC: 9.8 MG/DL (ref 8.5–10.5)
CHLORIDE SERPL-SCNC: 104 MMOL/L (ref 96–112)
CO2 SERPL-SCNC: 26 MMOL/L (ref 20–33)
CREAT SERPL-MCNC: 0.86 MG/DL (ref 0.5–1.4)
EOSINOPHIL # BLD AUTO: 0.08 K/UL (ref 0–0.51)
EOSINOPHIL NFR BLD: 1.2 % (ref 0–6.9)
ERYTHROCYTE [DISTWIDTH] IN BLOOD BY AUTOMATED COUNT: 43.2 FL (ref 35.9–50)
FASTING STATUS PATIENT QL REPORTED: NORMAL
GLOBULIN SER CALC-MCNC: 2.3 G/DL (ref 1.9–3.5)
GLUCOSE SERPL-MCNC: 80 MG/DL (ref 65–99)
HCT VFR BLD AUTO: 48 % (ref 37–47)
HGB BLD-MCNC: 15.6 G/DL (ref 12–16)
IMM GRANULOCYTES # BLD AUTO: 0.04 K/UL (ref 0–0.11)
IMM GRANULOCYTES NFR BLD AUTO: 0.6 % (ref 0–0.9)
LYMPHOCYTES # BLD AUTO: 2.16 K/UL (ref 1–4.8)
LYMPHOCYTES NFR BLD: 31.5 % (ref 22–41)
MCH RBC QN AUTO: 29.7 PG (ref 27–33)
MCHC RBC AUTO-ENTMCNC: 32.5 G/DL (ref 33.6–35)
MCV RBC AUTO: 91.3 FL (ref 81.4–97.8)
MONOCYTES # BLD AUTO: 0.69 K/UL (ref 0–0.85)
MONOCYTES NFR BLD AUTO: 10.1 % (ref 0–13.4)
NEUTROPHILS # BLD AUTO: 3.81 K/UL (ref 2–7.15)
NEUTROPHILS NFR BLD: 55.6 % (ref 44–72)
NRBC # BLD AUTO: 0 K/UL
NRBC BLD-RTO: 0 /100 WBC
PLATELET # BLD AUTO: 255 K/UL (ref 164–446)
PMV BLD AUTO: 11 FL (ref 9–12.9)
POTASSIUM SERPL-SCNC: 3.8 MMOL/L (ref 3.6–5.5)
PROT SERPL-MCNC: 7.1 G/DL (ref 6–8.2)
RBC # BLD AUTO: 5.26 M/UL (ref 4.2–5.4)
SODIUM SERPL-SCNC: 141 MMOL/L (ref 135–145)
TROPONIN T SERPL-MCNC: 7 NG/L (ref 6–19)
WBC # BLD AUTO: 6.9 K/UL (ref 4.8–10.8)

## 2019-09-19 PROCEDURE — 85025 COMPLETE CBC W/AUTO DIFF WBC: CPT

## 2019-09-19 PROCEDURE — 80053 COMPREHEN METABOLIC PANEL: CPT

## 2019-09-19 PROCEDURE — 36415 COLL VENOUS BLD VENIPUNCTURE: CPT

## 2019-09-19 PROCEDURE — 86765 RUBEOLA ANTIBODY: CPT

## 2019-09-19 PROCEDURE — 84484 ASSAY OF TROPONIN QUANT: CPT

## 2019-09-20 LAB — MEV IGG SER IA-ACNC: 2.97

## 2019-09-24 ENCOUNTER — HOSPITAL ENCOUNTER (OUTPATIENT)
Dept: RADIOLOGY | Facility: MEDICAL CENTER | Age: 64
End: 2019-09-24
Attending: FAMILY MEDICINE
Payer: COMMERCIAL

## 2019-09-24 ENCOUNTER — OFFICE VISIT (OUTPATIENT)
Dept: MEDICAL GROUP | Facility: PHYSICIAN GROUP | Age: 64
End: 2019-09-24
Payer: COMMERCIAL

## 2019-09-24 VITALS
WEIGHT: 258 LBS | TEMPERATURE: 98.4 F | BODY MASS INDEX: 44.05 KG/M2 | DIASTOLIC BLOOD PRESSURE: 78 MMHG | OXYGEN SATURATION: 95 % | HEIGHT: 64 IN | RESPIRATION RATE: 20 BRPM | SYSTOLIC BLOOD PRESSURE: 142 MMHG | HEART RATE: 90 BPM

## 2019-09-24 DIAGNOSIS — E78.00 PURE HYPERCHOLESTEROLEMIA: ICD-10-CM

## 2019-09-24 DIAGNOSIS — R92.2 DENSE BREAST: ICD-10-CM

## 2019-09-24 DIAGNOSIS — R00.2 HEART PALPITATIONS: ICD-10-CM

## 2019-09-24 DIAGNOSIS — Z11.59 NEED FOR HEPATITIS C SCREENING TEST: ICD-10-CM

## 2019-09-24 DIAGNOSIS — E80.6 HYPERBILIRUBINEMIA: ICD-10-CM

## 2019-09-24 DIAGNOSIS — I10 ESSENTIAL HYPERTENSION: ICD-10-CM

## 2019-09-24 DIAGNOSIS — Z12.31 VISIT FOR SCREENING MAMMOGRAM: ICD-10-CM

## 2019-09-24 DIAGNOSIS — Z11.59 NEED FOR HEPATITIS B SCREENING TEST: ICD-10-CM

## 2019-09-24 DIAGNOSIS — R92.30 DENSE BREAST: ICD-10-CM

## 2019-09-24 PROCEDURE — 99214 OFFICE O/P EST MOD 30 MIN: CPT | Performed by: FAMILY MEDICINE

## 2019-09-24 PROCEDURE — 4410555 US-SCREENING WHOLE BREAST (3D SCREENING)

## 2019-09-24 PROCEDURE — 77063 BREAST TOMOSYNTHESIS BI: CPT

## 2019-09-24 NOTE — PROGRESS NOTES
cc: Palpitations    Subjective:     Kiley Foss is a 63 y.o. female presenting for follow-up of her recent labs and to discuss her heart palpitations.    1. Heart palpitations  This is an ongoing issue.  She has had an EKG and an echocardiogram recently with normal results.  She is scheduled to follow-up with cardiology, Dr. casey, in November.  She had an episode of midsternal chest pain while at rest with no radiation.  She was concerned and called [facility].  Keenan are round and EKG and recommended a troponin test.  Patient had these labs done last week with results in the normal range.  Has not had any recurrence of this pain since then.      2. Essential hypertension  Chronic medical diagnosis.  She is brought in her blood pressure readings from home.  Readings are in the systolics 130s to 140s range.  Denies any headaches.  Continues to take hydrochlorothiazide 25 mg daily.  Discontinue to complain of chronic knee pain which she thinks could be contributing to her blood pressure being slightly elevated.  She is scheduled to follow-up with Ortho next week for injection    3. Need for hepatitis C screening test  She will need to be tested for hepatitis C.    4. Hyperbilirubinemia  Chronic medical diagnosis.  Reviewing her labs back 7 8 years it seems that her bilirubin level has been elevated.  Most recent labs show a level of 2.1.  Denies any jaundice or pruritus.    5. Need for hepatitis B screening test  She will need to be screened for hepatitis B.  Unsure if she is been immunized.    6. Pure hypercholesterolemia  Chronic medical diagnosis.  Recent labs from July 2019 do have total cholesterol 197, triglycerides 132, HDL 49, and .       Review of systems:  See above and negative for fever chills  Allergies   Allergen Reactions   • Augmentin      Feels anxious   • Cephalexin Vomiting   • Pcn [Penicillins]    • Sulfa Drugs          Current Outpatient Medications:   •  Diclofenac Sodium 1 % Gel, APPLY  "1 TO 2 GRAMS  AA 4 TO 5 TIMES DAILY, Disp: , Rfl: 0  •  hydroCHLOROthiazide (HYDRODIURIL) 25 MG Tab, TAKE 1 TABLET BY MOUTH EVERY DAY, Disp: 90 Tab, Rfl: 2  •  azelastine (ASTELIN) 137 MCG/SPRAY nasal spray, SPRAY 1 SPRAY IN EACH NOSTRIL BID, Disp: , Rfl: 12  •  albuterol (PROVENTIL) 2.5mg/3ml Nebu Soln solution for nebulization, INHALE 1 VIAL BY MOUTH VIA NEBULIZER EVERY 4 HOURS AS NEEDED FOR SHORTNESS OF BREATH, Disp: 75 mL, Rfl: 0  •  sertraline (ZOLOFT) 50 MG Tab, TAKE 1 TABLET BY MOUTH EVERY DAY, Disp: 90 Tab, Rfl: 3  •  aspirin EC (ECOTRIN) 81 MG Tablet Delayed Response, Take 81 mg by mouth every day., Disp: , Rfl:   •  fluticasone (FLONASE) 50 MCG/ACT nasal spray, SPRAY 2 SPRAYS IN EACH NOSTRIL EVERY DAY, Disp: 1 Bottle, Rfl: 6  •  PROVENTIL  (90 Base) MCG/ACT Aero Soln inhalation aerosol, INHALE 2 PUFFS BY MOUTH EVERY 6 HOURS AS NEEDED, Disp: 20.1 g, Rfl: 3  •  cyanocobalamin (VITAMIN B-12) 100 MCG TABS, Take 100 mcg by mouth every day., Disp: , Rfl:   •  vitamin D (CHOLECALCIFEROL) 1000 UNIT TABS, Take 1,000 Units by mouth every day., Disp: , Rfl:   •  Cetirizine HCl (ZYRTEC PO), Take  by mouth., Disp: , Rfl:   •  Multiple Vitamins-Minerals (WOMENS MULTI VITAMIN & MINERAL PO), Take  by mouth., Disp: , Rfl:     Allergies, past medical history, past surgical history, family history, social history reviewed and updated    Objective:     Vitals: /78 (BP Location: Right arm, Patient Position: Sitting, BP Cuff Size: Adult long)   Pulse 90   Temp 36.9 °C (98.4 °F) (Temporal)   Resp 20   Ht 1.626 m (5' 4\")   Wt 117 kg (258 lb)   LMP 07/26/2012   SpO2 95%   BMI 44.29 kg/m²   General:  Alert, pleasant, NAD  Eyes:  normal inspection of conjunctivae and lids, EOMI,   ENMT:  External ears and nose are normal.    Neck  supple,   Heart:  Regular rate and rhythm,  No LE edema  Respiratory:  Normal respiratory effort, Clear to auscultation bilaterally.  Abdomen:   soft, Non-distended,   Skin:  Warm, " dry, no rashes,   Musculoskeletal:  Normal gait, Normal digits and nails.  Neurological: No tremors,   Psych:   Affect/mood is normal, judgement is good, memory is intact, grooming is appropriate.    Assessment/Plan:     Kiley was seen today for follow-up.    Diagnoses and all orders for this visit:    Heart palpitations  Chronic medical problem.  Episodes are not as frequent as they were before.  Has an appointment with cardiology next month.  Precautions discussed with patient.    Essential hypertension  Medical diagnosis.  Stable with hydrochlorothiazide.    Need for hepatitis C screening test  -     HEP C VIRUS ANTIBODY; Future    Hyperbilirubinemia  Chronic medical diagnosis.  Not improving.  Has not been further worked up.  -     US-RUQ; Future  -     Comp Metabolic Panel; Future    Need for hepatitis B screening test  -     HEP B SURFACE AB; Future    Pure hypercholesterolemia  Chronic medical diagnosis.  Currently working on lifestyle modifications.  We will recheck labs prior to next appointment with me.      Return in about 4 months (around 1/24/2020), or if symptoms worsen or fail to improve.  This note was created using voice recognition software (Dragon). The accuracy of the dictation is limited by the abilities of the software. I have reviewed the note prior to signing, however some errors in grammar and context are still possible. If you have any questions related to this note please do not hesitate to contact our office.

## 2019-10-08 ENCOUNTER — HOSPITAL ENCOUNTER (OUTPATIENT)
Dept: RADIOLOGY | Facility: MEDICAL CENTER | Age: 64
End: 2019-10-08
Attending: FAMILY MEDICINE
Payer: COMMERCIAL

## 2019-10-08 DIAGNOSIS — E80.6 HYPERBILIRUBINEMIA: ICD-10-CM

## 2019-10-08 PROCEDURE — 76705 ECHO EXAM OF ABDOMEN: CPT

## 2019-10-09 DIAGNOSIS — R06.02 SOB (SHORTNESS OF BREATH): ICD-10-CM

## 2019-10-09 NOTE — TELEPHONE ENCOUNTER
Was the patient seen in the last year in this department? Yes LOV 09/24/19    Does patient have an active prescription for medications requested? No     Received Request Via: Pharmacy

## 2019-10-10 RX ORDER — ALBUTEROL SULFATE 90 UG/1
2 AEROSOL, METERED RESPIRATORY (INHALATION) EVERY 6 HOURS PRN
Qty: 18 G | Refills: 0 | Status: SHIPPED | OUTPATIENT
Start: 2019-10-10 | End: 2020-02-28

## 2019-11-01 ENCOUNTER — OFFICE VISIT (OUTPATIENT)
Dept: CARDIOLOGY | Facility: MEDICAL CENTER | Age: 64
End: 2019-11-01
Payer: COMMERCIAL

## 2019-11-01 VITALS
HEART RATE: 68 BPM | DIASTOLIC BLOOD PRESSURE: 80 MMHG | HEIGHT: 64 IN | BODY MASS INDEX: 43.71 KG/M2 | OXYGEN SATURATION: 96 % | SYSTOLIC BLOOD PRESSURE: 142 MMHG | WEIGHT: 256 LBS

## 2019-11-01 DIAGNOSIS — I10 HTN (HYPERTENSION), MALIGNANT: ICD-10-CM

## 2019-11-01 DIAGNOSIS — R00.2 PALPITATIONS: ICD-10-CM

## 2019-11-01 PROCEDURE — 99244 OFF/OP CNSLTJ NEW/EST MOD 40: CPT | Performed by: INTERNAL MEDICINE

## 2019-11-01 PROCEDURE — 93000 ELECTROCARDIOGRAM COMPLETE: CPT | Performed by: INTERNAL MEDICINE

## 2019-11-01 RX ORDER — AMLODIPINE BESYLATE 5 MG/1
5 TABLET ORAL DAILY
Qty: 90 TAB | Refills: 3 | Status: SHIPPED | OUTPATIENT
Start: 2019-11-01 | End: 2020-01-15

## 2019-11-01 ASSESSMENT — ENCOUNTER SYMPTOMS
SPEECH CHANGE: 0
DEPRESSION: 0
FEVER: 0
WEIGHT LOSS: 0
MYALGIAS: 0
SHORTNESS OF BREATH: 0
SENSORY CHANGE: 0
EYE PAIN: 0
COUGH: 0
CHILLS: 0
BLURRED VISION: 0
ORTHOPNEA: 0
DOUBLE VISION: 0
PND: 0
DIZZINESS: 0
CLAUDICATION: 0
VOMITING: 0
PALPITATIONS: 1
ABDOMINAL PAIN: 0
EYE DISCHARGE: 0
NAUSEA: 0
BLOOD IN STOOL: 0
FALLS: 0
HALLUCINATIONS: 0
BRUISES/BLEEDS EASILY: 0
HEADACHES: 0
LOSS OF CONSCIOUSNESS: 0

## 2019-11-01 NOTE — PROGRESS NOTES
Chief Complaint   Patient presents with   • Palpitations     New patient        Subjective:   Kiley Foss is a 64 y.o. female who presents today for cardiac care and evaluation of palpitations. Palpitation is sporadic. No specific worsening symptoms or precipitating symptoms. Patient feels like his heart is being pulled down. No family history of sudden cardiac death. No presyncope or syncope associated with his palpitations.    I have personally interpreted her EKG today with patient, there is no evidence of acute coronary syndrome, no evidence of prior infarct, normal WI and QT interval, no significant conduction disease.    I have independently interpreted and reviewed echocardiogram's actual images with patient which showed normal left ventricular systolic function. No wall motion abnormality. No evidence of pulmonary hypertension. No significant valvular disease.      Past Medical History:   Diagnosis Date   • Anxiety    • ASTHMA    • Dyslipidemia    • GERD (gastroesophageal reflux disease)    • Hemorrhage    • Hemorrhoids 11/12/15    colonoscopy    • Multiple thyroid nodules    • Non-seasonal allergic rhinitis    • Obesity -- 45 7/27/2011   • RAD (reactive airway disease) 7/1/2011   • Simple phobia     flying     Past Surgical History:   Procedure Laterality Date   • BIOPSY GENERAL      thyroid    • SALPINGECTOMY      ectopic pregnancy   • SINUSOTOMIES     • TONSILLECTOMY     • TUBAL COAGULATION LAPAROSCOPIC BILATERAL       Family History   Problem Relation Age of Onset   • Hypertension Mother    • Diabetes Mother    • Cancer Father         Non Hodgekin's Lymphoma   • Cancer Sister         cervical   • Cancer Brother         skin   • Heart Disease Maternal Grandfather    • Stroke Maternal Grandfather    • Heart Disease Paternal Grandfather    • Stroke Paternal Grandfather      Social History     Socioeconomic History   • Marital status:      Spouse name: Not on file   • Number of children: 2S    • Years of education: 1y college   • Highest education level: Not on file   Occupational History   • Occupation: Retired     Employer: OTHER   Social Needs   • Financial resource strain: Not on file   • Food insecurity:     Worry: Not on file     Inability: Not on file   • Transportation needs:     Medical: Not on file     Non-medical: Not on file   Tobacco Use   • Smoking status: Never Smoker   • Smokeless tobacco: Never Used   Substance and Sexual Activity   • Alcohol use: Yes     Frequency: Monthly or less     Drinks per session: 1 or 2     Binge frequency: Never     Comment: twice a month   • Drug use: No   • Sexual activity: Yes     Partners: Male     Comment: , 2 sons, retired from [employer]   Lifestyle   • Physical activity:     Days per week: Not on file     Minutes per session: Not on file   • Stress: Not on file   Relationships   • Social connections:     Talks on phone: Not on file     Gets together: Not on file     Attends Buddhist service: Not on file     Active member of club or organization: Not on file     Attends meetings of clubs or organizations: Not on file     Relationship status: Not on file   • Intimate partner violence:     Fear of current or ex partner: Not on file     Emotionally abused: Not on file     Physically abused: Not on file     Forced sexual activity: Not on file   Other Topics Concern   • Not on file   Social History Narrative   • Not on file     Allergies   Allergen Reactions   • Augmentin      Feels anxious   • Cephalexin Vomiting   • Pcn [Penicillins]    • Sulfa Drugs      Outpatient Encounter Medications as of 11/1/2019   Medication Sig Dispense Refill   • albuterol 108 (90 Base) MCG/ACT Aero Soln inhalation aerosol INHALE 2 PUFFS BY MOUTH EVERY 6 HOURS AS NEEDED FOR SHORTNESS OF BREATH 18 g 0   • hydroCHLOROthiazide (HYDRODIURIL) 25 MG Tab TAKE 1 TABLET BY MOUTH EVERY DAY 90 Tab 2   • albuterol (PROVENTIL) 2.5mg/3ml Nebu Soln solution for nebulization INHALE 1 VIAL  BY MOUTH VIA NEBULIZER EVERY 4 HOURS AS NEEDED FOR SHORTNESS OF BREATH 75 mL 0   • sertraline (ZOLOFT) 50 MG Tab TAKE 1 TABLET BY MOUTH EVERY DAY 90 Tab 3   • aspirin EC (ECOTRIN) 81 MG Tablet Delayed Response Take 81 mg by mouth every day.     • fluticasone (FLONASE) 50 MCG/ACT nasal spray SPRAY 2 SPRAYS IN EACH NOSTRIL EVERY DAY 1 Bottle 6   • vitamin D (CHOLECALCIFEROL) 1000 UNIT TABS Take 1,000 Units by mouth every day.     • Multiple Vitamins-Minerals (WOMENS MULTI VITAMIN & MINERAL PO) Take  by mouth.     • Diclofenac Sodium 1 % Gel APPLY 1 TO 2 GRAMS  AA 4 TO 5 TIMES DAILY  0   • azelastine (ASTELIN) 137 MCG/SPRAY nasal spray SPRAY 1 SPRAY IN EACH NOSTRIL BID  12   • PROVENTIL  (90 Base) MCG/ACT Aero Soln inhalation aerosol INHALE 2 PUFFS BY MOUTH EVERY 6 HOURS AS NEEDED (Patient not taking: Reported on 11/1/2019) 20.1 g 3   • cyanocobalamin (VITAMIN B-12) 100 MCG TABS Take 100 mcg by mouth every day.     • Cetirizine HCl (ZYRTEC PO) Take  by mouth.       No facility-administered encounter medications on file as of 11/1/2019.      Review of Systems   Constitutional: Negative for chills, fever, malaise/fatigue and weight loss.   HENT: Negative for ear discharge, ear pain, hearing loss and nosebleeds.    Eyes: Negative for blurred vision, double vision, pain and discharge.   Respiratory: Negative for cough and shortness of breath.    Cardiovascular: Positive for palpitations. Negative for chest pain, orthopnea, claudication, leg swelling and PND.   Gastrointestinal: Negative for abdominal pain, blood in stool, melena, nausea and vomiting.   Genitourinary: Negative for dysuria and hematuria.   Musculoskeletal: Negative for falls, joint pain and myalgias.   Skin: Negative for itching and rash.   Neurological: Negative for dizziness, sensory change, speech change, loss of consciousness and headaches.   Endo/Heme/Allergies: Negative for environmental allergies. Does not bruise/bleed easily.  "  Psychiatric/Behavioral: Negative for depression, hallucinations and suicidal ideas.        Objective:   /80 (BP Location: Left arm, Patient Position: Sitting, BP Cuff Size: Adult)   Pulse 68   Ht 1.626 m (5' 4\")   Wt 116.1 kg (256 lb)   LMP 07/26/2012   SpO2 96%   BMI 43.94 kg/m²     Physical Exam   Constitutional: She is oriented to person, place, and time. No distress.   HENT:   Head: Normocephalic and atraumatic.   Right Ear: External ear normal.   Left Ear: External ear normal.   Eyes: Right eye exhibits no discharge. Left eye exhibits no discharge.   Neck: No JVD present. No thyromegaly present.   Cardiovascular: Normal rate, regular rhythm, normal heart sounds and intact distal pulses. Exam reveals no gallop and no friction rub.   No murmur heard.  Pulmonary/Chest: Breath sounds normal. No respiratory distress.   Abdominal: Bowel sounds are normal. She exhibits no distension. There is no tenderness.   Musculoskeletal: She exhibits no edema or tenderness.   Neurological: She is alert and oriented to person, place, and time. No cranial nerve deficit.   Skin: Skin is warm and dry. She is not diaphoretic.   Psychiatric: She has a normal mood and affect. Her behavior is normal.   Nursing note and vitals reviewed.      Assessment:     1. Palpitations  EKG    [facility] ZIO PATCH MONITOR   2. HTN (hypertension), malignant         Medical Decision Making:  Today's Assessment / Status / Plan:   I will obtain home long-term event monitoring with zio patch.  I will also send patient for sleep study evaluation  Trial of Amlodipine 5 mg once a day for better blood pressure control.    "

## 2019-11-04 LAB — EKG IMPRESSION: NORMAL

## 2019-11-19 ENCOUNTER — HOSPITAL ENCOUNTER (OUTPATIENT)
Dept: RADIOLOGY | Facility: MEDICAL CENTER | Age: 64
End: 2019-11-19
Attending: SPECIALIST
Payer: COMMERCIAL

## 2019-11-19 DIAGNOSIS — N95.0 POSTMENOPAUSAL BLEEDING: ICD-10-CM

## 2019-11-19 PROCEDURE — 76830 TRANSVAGINAL US NON-OB: CPT

## 2019-11-25 ENCOUNTER — NON-PROVIDER VISIT (OUTPATIENT)
Dept: CARDIOLOGY | Facility: MEDICAL CENTER | Age: 64
End: 2019-11-25
Payer: COMMERCIAL

## 2019-11-25 ENCOUNTER — TELEPHONE (OUTPATIENT)
Dept: CARDIOLOGY | Facility: MEDICAL CENTER | Age: 64
End: 2019-11-25

## 2019-11-25 DIAGNOSIS — I49.3 PVC (PREMATURE VENTRICULAR CONTRACTION): ICD-10-CM

## 2019-11-25 DIAGNOSIS — R00.2 PALPITATIONS: ICD-10-CM

## 2019-12-04 ENCOUNTER — HOSPITAL ENCOUNTER (OUTPATIENT)
Facility: MEDICAL CENTER | Age: 64
End: 2019-12-04
Attending: ORTHOPAEDIC SURGERY | Admitting: ORTHOPAEDIC SURGERY
Payer: COMMERCIAL

## 2019-12-12 ENCOUNTER — HOSPITAL ENCOUNTER (OUTPATIENT)
Dept: LAB | Facility: MEDICAL CENTER | Age: 64
End: 2019-12-12
Attending: INTERNAL MEDICINE
Payer: COMMERCIAL

## 2019-12-12 LAB
T4 FREE SERPL-MCNC: 0.88 NG/DL (ref 0.53–1.43)
TSH SERPL DL<=0.005 MIU/L-ACNC: 0.69 UIU/ML (ref 0.38–5.33)

## 2019-12-12 PROCEDURE — 84439 ASSAY OF FREE THYROXINE: CPT

## 2019-12-12 PROCEDURE — 84443 ASSAY THYROID STIM HORMONE: CPT

## 2019-12-12 PROCEDURE — 36415 COLL VENOUS BLD VENIPUNCTURE: CPT

## 2019-12-18 PROCEDURE — 0296T PR EXT ECG > 48HR TO 21 DAY RCRD W/CONECT INTL RCRD: CPT | Performed by: INTERNAL MEDICINE

## 2019-12-18 PROCEDURE — 0298T PR EXT ECG > 48HR TO 21 DAY REVIEW AND INTERPRETATN: CPT | Performed by: INTERNAL MEDICINE

## 2019-12-26 ENCOUNTER — TELEPHONE (OUTPATIENT)
Dept: CARDIOLOGY | Facility: MEDICAL CENTER | Age: 64
End: 2019-12-26

## 2019-12-26 NOTE — TELEPHONE ENCOUNTER
Returned patient call. Discussed Zio patch results. Pt informed to have Dr. Lind's office send us a surgical clearance request if in fact she does need one. [phone] provided to patient. Discussed sleep study referral. Pt has not scheduled yet. Pt given [phone] to call to schedule. Pt also notified that the plan moving forward is 2/2020 f/u. Pt is appreciative of the information and call back and wishes me a happy new year.

## 2019-12-26 NOTE — TELEPHONE ENCOUNTER
TT      Patient is calling to discuss surgical clearance and her monitor results. She also wants to discuss what the plan is going forward. She can be reached at [phone].

## 2020-01-08 ENCOUNTER — APPOINTMENT (RX ONLY)
Dept: URBAN - METROPOLITAN AREA CLINIC 4 | Facility: CLINIC | Age: 65
Setting detail: DERMATOLOGY
End: 2020-01-08

## 2020-01-08 DIAGNOSIS — L82.1 OTHER SEBORRHEIC KERATOSIS: ICD-10-CM

## 2020-01-08 DIAGNOSIS — D18.0 HEMANGIOMA: ICD-10-CM

## 2020-01-08 DIAGNOSIS — D22 MELANOCYTIC NEVI: ICD-10-CM

## 2020-01-08 DIAGNOSIS — L81.4 OTHER MELANIN HYPERPIGMENTATION: ICD-10-CM

## 2020-01-08 PROBLEM — D22.61 MELANOCYTIC NEVI OF RIGHT UPPER LIMB, INCLUDING SHOULDER: Status: ACTIVE | Noted: 2020-01-08

## 2020-01-08 PROBLEM — D22.62 MELANOCYTIC NEVI OF LEFT UPPER LIMB, INCLUDING SHOULDER: Status: ACTIVE | Noted: 2020-01-08

## 2020-01-08 PROBLEM — D22.71 MELANOCYTIC NEVI OF RIGHT LOWER LIMB, INCLUDING HIP: Status: ACTIVE | Noted: 2020-01-08

## 2020-01-08 PROBLEM — D48.5 NEOPLASM OF UNCERTAIN BEHAVIOR OF SKIN: Status: ACTIVE | Noted: 2020-01-08

## 2020-01-08 PROBLEM — D18.01 HEMANGIOMA OF SKIN AND SUBCUTANEOUS TISSUE: Status: ACTIVE | Noted: 2020-01-08

## 2020-01-08 PROBLEM — D22.5 MELANOCYTIC NEVI OF TRUNK: Status: ACTIVE | Noted: 2020-01-08

## 2020-01-08 PROBLEM — D22.72 MELANOCYTIC NEVI OF LEFT LOWER LIMB, INCLUDING HIP: Status: ACTIVE | Noted: 2020-01-08

## 2020-01-08 PROCEDURE — 99386 PREV VISIT NEW AGE 40-64: CPT | Mod: 25

## 2020-01-08 PROCEDURE — 11103 TANGNTL BX SKIN EA SEP/ADDL: CPT

## 2020-01-08 PROCEDURE — ? SUNSCREEN RECOMMENDATIONS

## 2020-01-08 PROCEDURE — 11102 TANGNTL BX SKIN SINGLE LES: CPT

## 2020-01-08 PROCEDURE — ? BIOPSY BY SHAVE METHOD

## 2020-01-08 PROCEDURE — ? COUNSELING

## 2020-01-08 ASSESSMENT — LOCATION DETAILED DESCRIPTION DERM
LOCATION DETAILED: LEFT PROXIMAL DORSAL FOREARM
LOCATION DETAILED: SUPERIOR THORACIC SPINE
LOCATION DETAILED: SUPERIOR LUMBAR SPINE
LOCATION DETAILED: LEFT CENTRAL MALAR CHEEK
LOCATION DETAILED: RIGHT ANTERIOR SHOULDER
LOCATION DETAILED: NASAL DORSUM
LOCATION DETAILED: LEFT ANTERIOR PROXIMAL THIGH
LOCATION DETAILED: RIGHT RIB CAGE
LOCATION DETAILED: RIGHT INFERIOR CENTRAL MALAR CHEEK
LOCATION DETAILED: RIGHT PROXIMAL DORSAL FOREARM
LOCATION DETAILED: LEFT INFERIOR ANTERIOR NECK
LOCATION DETAILED: LEFT SUPERIOR MEDIAL UPPER BACK
LOCATION DETAILED: RIGHT RADIAL DORSAL HAND
LOCATION DETAILED: UPPER STERNUM
LOCATION DETAILED: LEFT PROXIMAL POSTERIOR UPPER ARM
LOCATION DETAILED: PERIUMBILICAL SKIN
LOCATION DETAILED: RIGHT SUPERIOR FOREHEAD
LOCATION DETAILED: RIGHT CENTRAL MALAR CHEEK
LOCATION DETAILED: RIGHT ANTERIOR PROXIMAL THIGH
LOCATION DETAILED: RIGHT MEDIAL SUPERIOR CHEST
LOCATION DETAILED: LEFT ULNAR DORSAL HAND
LOCATION DETAILED: RIGHT SUPERIOR MEDIAL MIDBACK
LOCATION DETAILED: RIGHT DISTAL POSTERIOR UPPER ARM

## 2020-01-08 ASSESSMENT — LOCATION ZONE DERM
LOCATION ZONE: FACE
LOCATION ZONE: HAND
LOCATION ZONE: LEG
LOCATION ZONE: ARM
LOCATION ZONE: NOSE
LOCATION ZONE: NECK
LOCATION ZONE: TRUNK

## 2020-01-08 ASSESSMENT — LOCATION SIMPLE DESCRIPTION DERM
LOCATION SIMPLE: RIGHT FOREARM
LOCATION SIMPLE: RIGHT POSTERIOR UPPER ARM
LOCATION SIMPLE: RIGHT LOWER BACK
LOCATION SIMPLE: LOWER BACK
LOCATION SIMPLE: CHEST
LOCATION SIMPLE: UPPER BACK
LOCATION SIMPLE: LEFT CHEEK
LOCATION SIMPLE: RIGHT FOREHEAD
LOCATION SIMPLE: LEFT ANTERIOR NECK
LOCATION SIMPLE: LEFT HAND
LOCATION SIMPLE: RIGHT SHOULDER
LOCATION SIMPLE: LEFT POSTERIOR UPPER ARM
LOCATION SIMPLE: LEFT FOREARM
LOCATION SIMPLE: RIGHT HAND
LOCATION SIMPLE: RIGHT CHEEK
LOCATION SIMPLE: ABDOMEN
LOCATION SIMPLE: NOSE
LOCATION SIMPLE: RIGHT THIGH
LOCATION SIMPLE: LEFT UPPER BACK
LOCATION SIMPLE: LEFT THIGH

## 2020-01-08 NOTE — PROCEDURE: BIOPSY BY SHAVE METHOD
Bill For Surgical Tray: no
Depth Of Biopsy: dermis
Type Of Destruction Used: Curettage
Consent: Written consent was obtained and risks were reviewed including but not limited to scarring, infection, bleeding, scabbing, incomplete removal, nerve damage and allergy to anesthesia.
Size Of Lesion In Cm: 0.1
Anesthesia Volume In Cc: 2
Electrodesiccation And Curettage Text: The wound bed was treated with electrodesiccation and curettage after the biopsy was performed.
Dressing: bandage
Biopsy Method: Personna blade
Hemostasis: Drysol
Silver Nitrate Text: The wound bed was treated with silver nitrate after the biopsy was performed.
Detail Level: Detailed
Curettage Text: The wound bed was treated with curettage after the biopsy was performed.
Was A Bandage Applied: Yes
Billing Type: Third-Party Bill
Lab: [id number]
Anesthesia Type: 1% lidocaine with epinephrine
Cryotherapy Text: The wound bed was treated with cryotherapy after the biopsy was performed.
Post-Care Instructions: I reviewed with the patient in detail post-care instructions. Patient is to keep the biopsy site dry overnight, and then apply bacitracin twice daily until healed. Patient may apply hydrogen peroxide soaks to remove any crusting.
Additional Anesthesia Volume In Cc (Will Not Render If 0): 0
Wound Care: Vaseline
Lab Facility: 
Biopsy Type: H and E
Notification Instructions: Patient will be notified of biopsy results. However, patient instructed to call the office if not contacted within 2 weeks.
Electrodesiccation Text: The wound bed was treated with electrodesiccation after the biopsy was performed.

## 2020-01-08 NOTE — HPI: FULL BODY SKIN EXAMINATION
How Severe Are Your Spot(S)?: mild
What Type Of Note Output Would You Prefer (Optional)?: Standard Output
What Is The Reason For Today's Visit?: Full Body Skin Examination
What Is The Reason For Today's Visit? (Being Monitored For X): concerning skin lesions on an annual basis
Additional History: Patient presents for a full body exam states she has no concerns at this time. No history of skin cancer.

## 2020-01-11 ENCOUNTER — HOSPITAL ENCOUNTER (OUTPATIENT)
Dept: LAB | Facility: MEDICAL CENTER | Age: 65
End: 2020-01-11
Attending: FAMILY MEDICINE
Payer: COMMERCIAL

## 2020-01-11 DIAGNOSIS — E80.6 HYPERBILIRUBINEMIA: ICD-10-CM

## 2020-01-11 DIAGNOSIS — Z11.59 NEED FOR HEPATITIS B SCREENING TEST: ICD-10-CM

## 2020-01-11 DIAGNOSIS — E78.00 PURE HYPERCHOLESTEROLEMIA: ICD-10-CM

## 2020-01-11 DIAGNOSIS — Z11.59 NEED FOR HEPATITIS C SCREENING TEST: ICD-10-CM

## 2020-01-11 LAB
ALBUMIN SERPL BCP-MCNC: 4.4 G/DL (ref 3.2–4.9)
ALBUMIN/GLOB SERPL: 1.9 G/DL
ALP SERPL-CCNC: 65 U/L (ref 30–99)
ALT SERPL-CCNC: 17 U/L (ref 2–50)
ANION GAP SERPL CALC-SCNC: 7 MMOL/L (ref 0–11.9)
AST SERPL-CCNC: 16 U/L (ref 12–45)
BILIRUB SERPL-MCNC: 2.2 MG/DL (ref 0.1–1.5)
BUN SERPL-MCNC: 17 MG/DL (ref 8–22)
CALCIUM SERPL-MCNC: 9.4 MG/DL (ref 8.5–10.5)
CHLORIDE SERPL-SCNC: 104 MMOL/L (ref 96–112)
CHOLEST SERPL-MCNC: 167 MG/DL (ref 100–199)
CO2 SERPL-SCNC: 31 MMOL/L (ref 20–33)
CREAT SERPL-MCNC: 0.81 MG/DL (ref 0.5–1.4)
FASTING STATUS PATIENT QL REPORTED: NORMAL
GLOBULIN SER CALC-MCNC: 2.3 G/DL (ref 1.9–3.5)
GLUCOSE SERPL-MCNC: 86 MG/DL (ref 65–99)
HBV SURFACE AB SERPL IA-ACNC: 7.55 MIU/ML (ref 0–10)
HCV AB SER QL: NEGATIVE
HDLC SERPL-MCNC: 50 MG/DL
LDLC SERPL CALC-MCNC: 96 MG/DL
POTASSIUM SERPL-SCNC: 3.8 MMOL/L (ref 3.6–5.5)
PROT SERPL-MCNC: 6.7 G/DL (ref 6–8.2)
SODIUM SERPL-SCNC: 142 MMOL/L (ref 135–145)
TRIGL SERPL-MCNC: 103 MG/DL (ref 0–149)

## 2020-01-11 PROCEDURE — 80061 LIPID PANEL: CPT

## 2020-01-11 PROCEDURE — 36415 COLL VENOUS BLD VENIPUNCTURE: CPT

## 2020-01-11 PROCEDURE — 80053 COMPREHEN METABOLIC PANEL: CPT

## 2020-01-11 PROCEDURE — 86706 HEP B SURFACE ANTIBODY: CPT

## 2020-01-11 PROCEDURE — 86803 HEPATITIS C AB TEST: CPT

## 2020-01-15 ENCOUNTER — OFFICE VISIT (OUTPATIENT)
Dept: MEDICAL GROUP | Facility: PHYSICIAN GROUP | Age: 65
End: 2020-01-15
Payer: COMMERCIAL

## 2020-01-15 VITALS
DIASTOLIC BLOOD PRESSURE: 72 MMHG | HEART RATE: 61 BPM | BODY MASS INDEX: 43.54 KG/M2 | HEIGHT: 64 IN | WEIGHT: 255 LBS | TEMPERATURE: 98.2 F | OXYGEN SATURATION: 97 % | SYSTOLIC BLOOD PRESSURE: 132 MMHG

## 2020-01-15 DIAGNOSIS — I10 ESSENTIAL HYPERTENSION: ICD-10-CM

## 2020-01-15 DIAGNOSIS — E78.00 PURE HYPERCHOLESTEROLEMIA: ICD-10-CM

## 2020-01-15 DIAGNOSIS — R00.2 HEART PALPITATIONS: ICD-10-CM

## 2020-01-15 DIAGNOSIS — J30.89 NON-SEASONAL ALLERGIC RHINITIS, UNSPECIFIED TRIGGER: ICD-10-CM

## 2020-01-15 DIAGNOSIS — R17 ELEVATED BILIRUBIN: ICD-10-CM

## 2020-01-15 PROCEDURE — 99214 OFFICE O/P EST MOD 30 MIN: CPT | Performed by: FAMILY MEDICINE

## 2020-01-15 RX ORDER — PREDNISONE 10 MG/1
TABLET ORAL
COMMUNITY
Start: 2020-01-07 | End: 2020-01-21

## 2020-01-15 RX ORDER — DOXYCYCLINE HYCLATE 100 MG/1
CAPSULE ORAL 2 TIMES DAILY
COMMUNITY
Start: 2020-01-07 | End: 2020-03-04

## 2020-01-15 ASSESSMENT — PATIENT HEALTH QUESTIONNAIRE - PHQ9: CLINICAL INTERPRETATION OF PHQ2 SCORE: 0

## 2020-01-15 NOTE — PROGRESS NOTES
cc: Hypertension    Subjective:     Kiley Foss is a 64 y.o. female presenting for hypertension.  She mentions that she is scheduled for a left knee replacement on February 7 with Dr. Lind.  States that she does not need a preop visit.  Her orthopedic will be getting clearance from cardiology.    Essential hypertension/ heart palpitations  Chronic medical diagnosis.  She was evaluated by cardiology, Dr Monson.  She underwent a ZIO Patch with findings of PVCs.  Denies any palpitations or chest pain.  Blood pressure today is 132/72.  Continues to take amlodipine 5 mg daily.  Mentions that her cardiologist has referred her for a sleep study with Dr. Vickey Lechuga on May 1.    Allergic rhinitis  Chronic medical diagnosis.  She is started following up with ENT.  They started her on doxycycline for 10 days as well as high-dose steroids.  She has noticed improvement in her sinuses.      Elevated bilirubin   Chronic medical diagnosis.  Her bilirubin has been elevated since 2011.  With repeat labs her bilirubin is increased to 2.2.  Denies any abdominal pain she did have an abdominal sonogram with findings of fatty liver.  Denies any jaundice.     Pure hypercholesterolemia.  Chronic.  Labs improved in January. No meds      Review of systems:  See above and positive for bilateral knee pain.  Negative for fever chills.  Allergies   Allergen Reactions   • Augmentin      Feels anxious   • Cephalexin Vomiting   • Pcn [Penicillins]    • Sulfa Drugs          Current Outpatient Medications:   •  albuterol 108 (90 Base) MCG/ACT Aero Soln inhalation aerosol, INHALE 2 PUFFS BY MOUTH EVERY 6 HOURS AS NEEDED FOR SHORTNESS OF BREATH, Disp: 18 g, Rfl: 0  •  Diclofenac Sodium 1 % Gel, APPLY 1 TO 2 GRAMS  AA 4 TO 5 TIMES DAILY, Disp: , Rfl: 0  •  hydroCHLOROthiazide (HYDRODIURIL) 25 MG Tab, TAKE 1 TABLET BY MOUTH EVERY DAY, Disp: 90 Tab, Rfl: 2  •  azelastine (ASTELIN) 137 MCG/SPRAY nasal spray, SPRAY 1 SPRAY IN EACH NOSTRIL BID,  "Disp: , Rfl: 12  •  albuterol (PROVENTIL) 2.5mg/3ml Nebu Soln solution for nebulization, INHALE 1 VIAL BY MOUTH VIA NEBULIZER EVERY 4 HOURS AS NEEDED FOR SHORTNESS OF BREATH, Disp: 75 mL, Rfl: 0  •  sertraline (ZOLOFT) 50 MG Tab, TAKE 1 TABLET BY MOUTH EVERY DAY, Disp: 90 Tab, Rfl: 3  •  aspirin EC (ECOTRIN) 81 MG Tablet Delayed Response, Take 81 mg by mouth every day., Disp: , Rfl:   •  fluticasone (FLONASE) 50 MCG/ACT nasal spray, SPRAY 2 SPRAYS IN EACH NOSTRIL EVERY DAY, Disp: 1 Bottle, Rfl: 6  •  cyanocobalamin (VITAMIN B-12) 100 MCG TABS, Take 100 mcg by mouth every day., Disp: , Rfl:   •  vitamin D (CHOLECALCIFEROL) 1000 UNIT TABS, Take 1,000 Units by mouth every day., Disp: , Rfl:   •  Cetirizine HCl (ZYRTEC PO), Take  by mouth., Disp: , Rfl:   •  Multiple Vitamins-Minerals (WOMENS MULTI VITAMIN & MINERAL PO), Take  by mouth., Disp: , Rfl:   •  doxycycline (VIBRAMYCIN) 100 MG Cap, TK 1 C PO Q 12 H FOR 10 DAYS, Disp: , Rfl:   •  predniSONE (DELTASONE) 10 MG Tab, , Disp: , Rfl:     Allergies, past medical history, past surgical history, family history, social history reviewed and updated    Objective:     Vitals: /72 (BP Location: Right arm, Patient Position: Sitting, BP Cuff Size: Large adult)   Pulse 61   Temp 36.8 °C (98.2 °F) (Temporal)   Ht 1.626 m (5' 4\")   Wt 115.7 kg (255 lb)   LMP 07/26/2012   SpO2 97%   BMI 43.77 kg/m²   General:  Alert, pleasant, NAD  Eyes:  normal inspection of conjunctivae and lids, EOMI,   ENMT:  External ears and nose are normal.    Neck  supple,   Heart:  Regular rate and rhythm,  No LE edema  Respiratory:  Normal respiratory effort, Clear to auscultation bilaterally.  Abdomen:   soft, Non-distended,   Skin:  Warm, dry, no rashes,   Musculoskeletal:  Normal gait, Normal digits and nails.  Neurological: No tremors,   Psych:   Affect/mood is normal, judgement is good, memory is intact, grooming is appropriate.    Assessment/Plan:     Kiley was seen today for " hypertension and knee pain.    Diagnoses and all orders for this visit:    Essential hypertension  Chronic medical diagnosis.  Stable.  Continue with amlodipine.    Heart palpitations  History of.  Improved.  Work-up did show PVCs.  Continue to follow-up with cardiology.    Non-seasonal allergic rhinitis, unspecified trigger  Chronic medical diagnosis.  Improving.    Pure hypercholesterolemia  Chronic medical diagnosis.  Improving without any medications.  We will continue to monitor.    Elevated bilirubin  Chronic medical diagnosis.  Pathogenesis of fatty liver discussed with patient.  We will continue to monitor labs.  Also encouraged her to get the hepatitis B vaccines.  She will start the series after her knee replacement.        Return in about 4 months (around 5/15/2020) for hyperlipidemia, f/u Hypertension.  This note was created using voice recognition software (Dragon). The accuracy of the dictation is limited by the abilities of the software. I have reviewed the note prior to signing, however some errors in grammar and context are still possible. If you have any questions related to this note please do not hesitate to contact our office.

## 2020-01-21 VITALS — WEIGHT: 252.43 LBS | HEIGHT: 64 IN | BODY MASS INDEX: 43.1 KG/M2

## 2020-01-21 DIAGNOSIS — Z01.812 PRE-OPERATIVE LABORATORY EXAMINATION: ICD-10-CM

## 2020-01-21 LAB
APPEARANCE UR: CLEAR
BACTERIA #/AREA URNS HPF: NEGATIVE /HPF
BASOPHILS # BLD AUTO: 0.4 % (ref 0–1.8)
BASOPHILS # BLD: 0.04 K/UL (ref 0–0.12)
BILIRUB UR QL STRIP.AUTO: NEGATIVE
COLOR UR: YELLOW
EOSINOPHIL # BLD AUTO: 0 K/UL (ref 0–0.51)
EOSINOPHIL NFR BLD: 0 % (ref 0–6.9)
EPI CELLS #/AREA URNS HPF: NEGATIVE /HPF
ERYTHROCYTE [DISTWIDTH] IN BLOOD BY AUTOMATED COUNT: 44.9 FL (ref 35.9–50)
GLUCOSE UR STRIP.AUTO-MCNC: NEGATIVE MG/DL
HCT VFR BLD AUTO: 46.2 % (ref 37–47)
HGB BLD-MCNC: 14.9 G/DL (ref 12–16)
HYALINE CASTS #/AREA URNS LPF: NORMAL /LPF
IMM GRANULOCYTES # BLD AUTO: 0.07 K/UL (ref 0–0.11)
IMM GRANULOCYTES NFR BLD AUTO: 0.8 % (ref 0–0.9)
KETONES UR STRIP.AUTO-MCNC: NEGATIVE MG/DL
LEUKOCYTE ESTERASE UR QL STRIP.AUTO: ABNORMAL
LYMPHOCYTES # BLD AUTO: 0.88 K/UL (ref 1–4.8)
LYMPHOCYTES NFR BLD: 9.5 % (ref 22–41)
MCH RBC QN AUTO: 30 PG (ref 27–33)
MCHC RBC AUTO-ENTMCNC: 32.3 G/DL (ref 33.6–35)
MCV RBC AUTO: 93.1 FL (ref 81.4–97.8)
MICRO URNS: ABNORMAL
MONOCYTES # BLD AUTO: 0.29 K/UL (ref 0–0.85)
MONOCYTES NFR BLD AUTO: 3.1 % (ref 0–13.4)
NEUTROPHILS # BLD AUTO: 8.02 K/UL (ref 2–7.15)
NEUTROPHILS NFR BLD: 86.2 % (ref 44–72)
NITRITE UR QL STRIP.AUTO: NEGATIVE
NRBC # BLD AUTO: 0 K/UL
NRBC BLD-RTO: 0 /100 WBC
PH UR STRIP.AUTO: 7 [PH] (ref 5–8)
PLATELET # BLD AUTO: 225 K/UL (ref 164–446)
PMV BLD AUTO: 11.1 FL (ref 9–12.9)
PROT UR QL STRIP: NEGATIVE MG/DL
RBC # BLD AUTO: 4.96 M/UL (ref 4.2–5.4)
RBC # URNS HPF: NORMAL /HPF
RBC UR QL AUTO: ABNORMAL
SP GR UR STRIP.AUTO: 1.01
UROBILINOGEN UR STRIP.AUTO-MCNC: 0.2 MG/DL
WBC # BLD AUTO: 9.3 K/UL (ref 4.8–10.8)
WBC #/AREA URNS HPF: NORMAL /HPF

## 2020-01-21 PROCEDURE — 87641 MR-STAPH DNA AMP PROBE: CPT

## 2020-01-21 PROCEDURE — 87640 STAPH A DNA AMP PROBE: CPT

## 2020-01-21 PROCEDURE — 85025 COMPLETE CBC W/AUTO DIFF WBC: CPT

## 2020-01-21 PROCEDURE — 36415 COLL VENOUS BLD VENIPUNCTURE: CPT

## 2020-01-21 PROCEDURE — 81001 URINALYSIS AUTO W/SCOPE: CPT

## 2020-01-21 RX ORDER — AMLODIPINE BESYLATE 5 MG/1
TABLET ORAL DAILY
COMMUNITY
Start: 2020-01-18 | End: 2020-08-31

## 2020-01-21 RX ORDER — ACETAMINOPHEN 325 MG/1
650 TABLET ORAL EVERY 4 HOURS PRN
COMMUNITY
End: 2021-12-15

## 2020-01-21 NOTE — OR NURSING
PreAdmit Appointment: Patient given Preparing for your procedure handout. Patient instructed to continue regularly prescribed medications through day before surgery. Instructed to take the following medications the day of surgery with a sip of water per Anesthesia protocol: Patient instructed to take Norvasc and  Albuterol or Proventil if necessary and to bring Albuterol day of surgery. Patient denies issues with anesthesia. Patient 5 Stop Bang score. Patient scheduled for Sleep Study on 5/1/20. Patient states rash with Latex and added to Case notes. Stop Bang and cardiac history emailed to Dr. Joel. Request for Cardiac clearance faxed and emailed to Dr. Lind's office.    We will need the copy of the cardiology note and definitely a primary care clearance for this patient whose BMI is over 40 and has STOP BANG score of 5, making her at higher risk of having sleep apnea.  Thank you.     Yoana Joel M.D.  [facility]        Called and faxed Dr. Gallegos's office for PCP clearance.

## 2020-01-21 NOTE — OR NURSING
"DISCHARGE PLANNING NOTE - TOTAL JOINT    Procedure: Left Knee  Procedure Date: 2/7/20  Insurance:    Equipment currently available at home? Graubaldoer, shoe horn  Steps into the home? 1  Steps within the home? none  Toilet height? Raised toilet seat  Type of shower? Walk in shower with bench  Who will be with you during your recovery? spouse  Is Outpatient Physical Therapy set up after surgery? Yes  Did you take the Total Joint Class and where? No   Planning same day discharge?No     Plan: TOTAL JOINT REPLACEMENT SURGERY   PreAdmit Appointment  Pre-Operative Education Note       1) Did you take a Total Joint Replacement Pre-Operative Education class?  Where?  Answer: No    2) If you did not take a class, did you receive pre-op education in the form of a book or through an online class?    Answer: No    3) Have you had the same joint replacement procedure within the last 3 years?   Answer:No    For patients who answered \"No\" to the above questions:     4) Was patient given information on [facility] Pre-Op Education class through the Pre-Op Education Class flyer or the Alternative Pre-op Education flyer?   Answer: Yes    5) Was a [facility] Total Joint Replacement Patient Guide binder handed out?   Answer:Yes    6) Did the patient refuse preoperative education?  Answer:  NO  "

## 2020-01-22 LAB
SCCMEC + MECA PNL NOSE NAA+PROBE: NEGATIVE
SCCMEC + MECA PNL NOSE NAA+PROBE: NEGATIVE

## 2020-01-23 NOTE — OR NURSING
Kaitlynn from Dr. Lind's office called and said she will initiate Dr. Joel's request for cardiology note and PCP clearance.

## 2020-02-10 ENCOUNTER — TELEPHONE (OUTPATIENT)
Dept: CARDIOLOGY | Facility: MEDICAL CENTER | Age: 65
End: 2020-02-10

## 2020-02-10 NOTE — TELEPHONE ENCOUNTER
Returned call. She had to cancel her 2/7/20 TKA because she had a cold, and it is now scheduled for 3/13/20. She said that [facility] should have sent a clearance request, but they also told her to f/u on it. Advised that we have not received clearance request.    PMH: HTN, palpitations, asthma    To TT

## 2020-02-10 NOTE — LETTER
PROCEDURE/SURGERY CLEARANCE FORM      Encounter Date: 2/10/2020    Patient: Kiley Foss  YOB: 1955    CARDIOLOGIST:  Martinez Monson MD    REFERRING DOCTOR:  Arnulfo Lind MD        The above patient is moderate cardiovascular risk to have the following procedure/surgery: TKA                                           Additional comments: N/A                   MD Signature   Martinez Monson MD

## 2020-02-10 NOTE — TELEPHONE ENCOUNTER
Letter drafted. A/w TT signature.     Paul Monson M.D.  You 4 hours ago (10:57 AM)      Moderate CV risk.     Paul Monson M.D.    Routing comment       You  Paul Monson M.D. 5 hours ago (10:40 AM)      Low, moderate, or high risk for TKA?

## 2020-02-12 NOTE — TELEPHONE ENCOUNTER
Signed letter faxed to [facility] at [phone]. Receipt confirmed. Letter to scanning.

## 2020-02-28 DIAGNOSIS — R06.02 SOB (SHORTNESS OF BREATH): ICD-10-CM

## 2020-02-28 RX ORDER — ALBUTEROL SULFATE 90 UG/1
AEROSOL, METERED RESPIRATORY (INHALATION)
Qty: 18 G | Refills: 2 | Status: SHIPPED | OUTPATIENT
Start: 2020-02-28 | End: 2021-03-23

## 2020-02-28 NOTE — TELEPHONE ENCOUNTER
Received request via: Pharmacy    Was the patient seen in the last year in this department? Yes LOV 01/15/2020    Does the patient have an active prescription (recently filled or refills available) for medication(s) requested? No

## 2020-02-28 NOTE — TELEPHONE ENCOUNTER
Requested Prescriptions     Pending Prescriptions Disp Refills   • sertraline (ZOLOFT) 50 MG Tab [Pharmacy Med Name: SERTRALINE 50MG TABLETS] 90 Tab 3     Sig: TAKE 1 TABLET BY MOUTH EVERY DAY   Anne Ryan M.D.

## 2020-02-28 NOTE — TELEPHONE ENCOUNTER
Requested Prescriptions     Pending Prescriptions Disp Refills   • VENTOLIN  (90 Base) MCG/ACT Aero Soln inhalation aerosol [Pharmacy Med Name: VENTOLIN HFA INH W/DOS CTR 200PUFFS] 18 g 2     Sig: INHALE 2 PUFFS BY MOUTH EVERY 6 HOURS AS NEEDED SHORTNESS OF BREATH   Anne Ryan M.D.

## 2020-03-02 ENCOUNTER — APPOINTMENT (OUTPATIENT)
Dept: ADMISSIONS | Facility: MEDICAL CENTER | Age: 65
End: 2020-03-02
Attending: ORTHOPAEDIC SURGERY
Payer: COMMERCIAL

## 2020-03-04 ENCOUNTER — OFFICE VISIT (OUTPATIENT)
Dept: MEDICAL GROUP | Facility: PHYSICIAN GROUP | Age: 65
End: 2020-03-04
Payer: COMMERCIAL

## 2020-03-04 VITALS
HEIGHT: 64 IN | TEMPERATURE: 98.8 F | WEIGHT: 258 LBS | HEART RATE: 80 BPM | BODY MASS INDEX: 44.05 KG/M2 | OXYGEN SATURATION: 96 % | DIASTOLIC BLOOD PRESSURE: 78 MMHG | SYSTOLIC BLOOD PRESSURE: 134 MMHG | RESPIRATION RATE: 16 BRPM

## 2020-03-04 DIAGNOSIS — I10 ESSENTIAL HYPERTENSION: ICD-10-CM

## 2020-03-04 DIAGNOSIS — R17 ELEVATED BILIRUBIN: ICD-10-CM

## 2020-03-04 DIAGNOSIS — J30.89 NON-SEASONAL ALLERGIC RHINITIS, UNSPECIFIED TRIGGER: ICD-10-CM

## 2020-03-04 PROBLEM — J30.9 ALLERGIC RHINITIS: Status: ACTIVE | Noted: 2018-10-30

## 2020-03-04 PROCEDURE — 99214 OFFICE O/P EST MOD 30 MIN: CPT | Performed by: FAMILY MEDICINE

## 2020-03-04 ASSESSMENT — FIBROSIS 4 INDEX: FIB4 SCORE: 1.07

## 2020-03-04 NOTE — PROGRESS NOTES
"cc: Preop clearance    Subjective:     Kiley Foss is a 64 y.o. female presenting initially for a preop clearance.  However she mentions that over the last 2 months she has not been feeling well.  Has been having sinus issues and continues to follow-up with ENT, Dr. Quinones.  She was seen in January and at that time was treated with doxycycline and a course of prednisone.  Afterwards she was treated with clindamycin.  She then returned and saw him February 12 at which time she was given a prednisone taper and had a CT of her sinuses.  She was told that there is no infection no polyps.  However she continues to feel congested and having a hard cecily with her sinuses.  Currently taking Flonase nasal spray twice a day, Zyrtec 10 mg daily, and as well as astelin spray.    Hypertension  Chronic medical diagnosis.  Currently taking hydrochlorothiazide 25 mg and amlodipine 5 mg daily.  Denies any headaches or chest pain.  Blood pressure today is 134/78.    Elevated bilirubin  Chronic medical diagnosis.  She has had elevated bilirubin since 2011.  Mentions that she does have a history of a blood transfusion back in 1994 when she had a ruptured ectopic.  Her HCV antibodies were negative.  Her hepatitis B antibodies were below 10.  She did have an ultrasound which was consistent with fatty liver.      Review of systems:  See above and negative for fevers chills  Allergies   Allergen Reactions   • Augmentin Anxiety     Feels like crawling on skin   • Cephalexin Vomiting     Generalized sense of \"not well\" went to Emergency Room   • Pcn [Penicillins] Anxiety   • Sulfa Drugs Anxiety     Sulfite reaction   • Latex Rash     Rash only         Current Outpatient Medications:   •  VENTOLIN  (90 Base) MCG/ACT Aero Soln inhalation aerosol, INHALE 2 PUFFS BY MOUTH EVERY 6 HOURS AS NEEDED SHORTNESS OF BREATH, Disp: 18 g, Rfl: 2  •  sertraline (ZOLOFT) 50 MG Tab, TAKE 1 TABLET BY MOUTH EVERY DAY, Disp: 90 Tab, Rfl: 3  •  " "amLODIPine (NORVASC) 5 MG Tab, Take  by mouth every day. Take 1 tablet by mouth every day, Disp: , Rfl:   •  acetaminophen (TYLENOL) 325 MG Tab, Take 650 mg by mouth every four hours as needed., Disp: , Rfl:   •  hydroCHLOROthiazide (HYDRODIURIL) 25 MG Tab, TAKE 1 TABLET BY MOUTH EVERY DAY (Patient taking differently: 25 mg every day.), Disp: 90 Tab, Rfl: 2  •  azelastine (ASTELIN) 137 MCG/SPRAY nasal spray, 1 Spray every day. Rotates with Flonase, Disp: , Rfl: 12  •  albuterol (PROVENTIL) 2.5mg/3ml Nebu Soln solution for nebulization, INHALE 1 VIAL BY MOUTH VIA NEBULIZER EVERY 4 HOURS AS NEEDED FOR SHORTNESS OF BREATH (Patient taking differently: as needed. Hasn't used for years), Disp: 75 mL, Rfl: 0  •  fluticasone (FLONASE) 50 MCG/ACT nasal spray, SPRAY 2 SPRAYS IN EACH NOSTRIL EVERY DAY, Disp: 1 Bottle, Rfl: 6  •  Cetirizine HCl (ZYRTEC PO), Take  by mouth every day., Disp: , Rfl:   •  Diclofenac Sodium (VOLTAREN) 1 % Gel, Apply  to skin as directed as needed. Knee pain, Disp: , Rfl:   •  aspirin EC (ECOTRIN) 81 MG Tablet Delayed Response, Take 81 mg by mouth every day., Disp: , Rfl:   •  cyanocobalamin (VITAMIN B-12) 100 MCG TABS, Take 100 mcg by mouth every day., Disp: , Rfl:   •  vitamin D (CHOLECALCIFEROL) 1000 UNIT TABS, Take 1,000 Units by mouth every day., Disp: , Rfl:   •  Multiple Vitamins-Minerals (WOMENS MULTI VITAMIN & MINERAL PO), Take  by mouth every day., Disp: , Rfl:     Allergies, past medical history, past surgical history, family history, social history reviewed and updated    Objective:     Vitals: /78 (BP Location: Right arm, Patient Position: Sitting, BP Cuff Size: Adult long)   Pulse 80   Temp 37.1 °C (98.8 °F) (Temporal)   Resp 16   Ht 1.626 m (5' 4\")   Wt 117 kg (258 lb)   LMP 07/26/2012   SpO2 96%   BMI 44.29 kg/m²   General:  Alert, pleasant, NAD  Eyes:  normal inspection of conjunctivae and lids, EOMI,   ENMT:  External ears and nose are normal.    Neck  supple,   Heart: "  Regular rate and rhythm,  No LE edema  Respiratory:  Normal respiratory effort, Clear to auscultation bilaterally.  Abdomen:   soft, Non-distended,   Skin:  Warm, dry, no rashes,   Musculoskeletal:  Normal gait, Normal digits and nails.  Neurological: No tremors,   Psych:   Affect/mood is normal, judgement is good, memory is intact, grooming is appropriate.    Assessment/Plan:     Kiley was seen today for medical clearance.    Diagnoses and all orders for this visit:    Non-seasonal allergic rhinitis, unspecified trigger  Chronic diagnosis.  Not well controlled.  Encouraged to f/u with allergy.  singulair discussed and patient wants to  Postpone.  -     REFERRAL TO ALLERGY    Essential hypertension  Chronic diagnosis.  Stable.  C/w meds    Elevated bilirubin  Chronic diagnosis.  Stable, new labs ordered.  -     BILIRUBIN TOTAL; Future  -     HEP B SURFACE ANTIGEN; Future  -     HEPATITIS B CORE AB W/REFLEX  -     BILIRUBIN DIRECT; Future  -     MITOCHONDRIAL (M2) AB; Future  -     ANTI-SMOOTH MUSCLE ABS; Future  -     SADE TITER; Future          No follow-ups on file.  This note was created using voice recognition software (Dragon). The accuracy of the dictation is limited by the abilities of the software. I have reviewed the note prior to signing, however some errors in grammar and context are still possible. If you have any questions related to this note please do not hesitate to contact our office.

## 2020-03-05 ENCOUNTER — APPOINTMENT (OUTPATIENT)
Dept: ADMISSIONS | Facility: MEDICAL CENTER | Age: 65
End: 2020-03-05
Attending: ORTHOPAEDIC SURGERY
Payer: COMMERCIAL

## 2020-03-11 ENCOUNTER — APPOINTMENT (RX ONLY)
Dept: URBAN - METROPOLITAN AREA CLINIC 4 | Facility: CLINIC | Age: 65
Setting detail: DERMATOLOGY
End: 2020-03-11

## 2020-05-15 ENCOUNTER — HOSPITAL ENCOUNTER (OUTPATIENT)
Dept: LAB | Facility: MEDICAL CENTER | Age: 65
End: 2020-05-15
Attending: FAMILY MEDICINE
Payer: COMMERCIAL

## 2020-05-15 DIAGNOSIS — R17 ELEVATED BILIRUBIN: ICD-10-CM

## 2020-05-15 LAB
BILIRUB CONJ SERPL-MCNC: 0.2 MG/DL (ref 0.1–0.5)
BILIRUB INDIRECT SERPL-MCNC: 1.4 MG/DL (ref 0–1)
BILIRUB SERPL-MCNC: 1.6 MG/DL (ref 0.1–1.5)
HBV CORE AB SERPL QL IA: NONREACTIVE
HBV SURFACE AG SER QL: NORMAL

## 2020-05-15 PROCEDURE — 82248 BILIRUBIN DIRECT: CPT

## 2020-05-15 PROCEDURE — 83516 IMMUNOASSAY NONANTIBODY: CPT | Mod: 91

## 2020-05-15 PROCEDURE — 36415 COLL VENOUS BLD VENIPUNCTURE: CPT

## 2020-05-15 PROCEDURE — 87340 HEPATITIS B SURFACE AG IA: CPT

## 2020-05-15 PROCEDURE — 86038 ANTINUCLEAR ANTIBODIES: CPT

## 2020-05-15 PROCEDURE — 86704 HEP B CORE ANTIBODY TOTAL: CPT

## 2020-05-15 PROCEDURE — 82247 BILIRUBIN TOTAL: CPT

## 2020-05-17 LAB — NUCLEAR IGG SER QL IA: NORMAL

## 2020-05-18 LAB
MITOCHONDRIA M2 IGG SER-ACNC: 6.5 UNITS (ref 0–20)
SMA IGG SER-ACNC: 14 UNITS (ref 0–19)

## 2020-05-20 DIAGNOSIS — R30.0 DYSURIA: ICD-10-CM

## 2020-05-22 ENCOUNTER — HOSPITAL ENCOUNTER (OUTPATIENT)
Dept: LAB | Facility: MEDICAL CENTER | Age: 65
End: 2020-05-22
Attending: FAMILY MEDICINE
Payer: COMMERCIAL

## 2020-05-22 DIAGNOSIS — R30.0 DYSURIA: ICD-10-CM

## 2020-05-22 LAB
APPEARANCE UR: ABNORMAL
BACTERIA #/AREA URNS HPF: NEGATIVE /HPF
BILIRUB UR QL STRIP.AUTO: NEGATIVE
COLOR UR: YELLOW
EPI CELLS #/AREA URNS HPF: ABNORMAL /HPF
GLUCOSE UR STRIP.AUTO-MCNC: NEGATIVE MG/DL
HYALINE CASTS #/AREA URNS LPF: ABNORMAL /LPF
KETONES UR STRIP.AUTO-MCNC: ABNORMAL MG/DL
LEUKOCYTE ESTERASE UR QL STRIP.AUTO: ABNORMAL
MICRO URNS: ABNORMAL
NITRITE UR QL STRIP.AUTO: NEGATIVE
PH UR STRIP.AUTO: 6 [PH] (ref 5–8)
PROT UR QL STRIP: NEGATIVE MG/DL
RBC # URNS HPF: ABNORMAL /HPF
RBC UR QL AUTO: NEGATIVE
SP GR UR STRIP.AUTO: 1.02
UROBILINOGEN UR STRIP.AUTO-MCNC: 0.2 MG/DL
WBC #/AREA URNS HPF: ABNORMAL /HPF

## 2020-05-22 PROCEDURE — 81001 URINALYSIS AUTO W/SCOPE: CPT

## 2020-05-27 ENCOUNTER — HOSPITAL ENCOUNTER (OUTPATIENT)
Facility: MEDICAL CENTER | Age: 65
End: 2020-05-27
Attending: FAMILY MEDICINE
Payer: COMMERCIAL

## 2020-05-27 ENCOUNTER — OFFICE VISIT (OUTPATIENT)
Dept: MEDICAL GROUP | Facility: PHYSICIAN GROUP | Age: 65
End: 2020-05-27
Payer: COMMERCIAL

## 2020-05-27 VITALS
SYSTOLIC BLOOD PRESSURE: 138 MMHG | RESPIRATION RATE: 20 BRPM | DIASTOLIC BLOOD PRESSURE: 78 MMHG | BODY MASS INDEX: 44.05 KG/M2 | TEMPERATURE: 98.1 F | HEIGHT: 64 IN | WEIGHT: 258 LBS | HEART RATE: 90 BPM | OXYGEN SATURATION: 96 %

## 2020-05-27 DIAGNOSIS — R30.0 DYSURIA: ICD-10-CM

## 2020-05-27 DIAGNOSIS — R17 ELEVATED BILIRUBIN: ICD-10-CM

## 2020-05-27 DIAGNOSIS — J30.89 ALLERGIC RHINITIS DUE TO OTHER ALLERGIC TRIGGER, UNSPECIFIED SEASONALITY: ICD-10-CM

## 2020-05-27 LAB
APPEARANCE UR: CLEAR
BILIRUB UR STRIP-MCNC: NEGATIVE MG/DL
COLOR UR AUTO: YELLOW
GLUCOSE UR STRIP.AUTO-MCNC: NEGATIVE MG/DL
KETONES UR STRIP.AUTO-MCNC: NEGATIVE MG/DL
LEUKOCYTE ESTERASE UR QL STRIP.AUTO: NEGATIVE
NITRITE UR QL STRIP.AUTO: NEGATIVE
PH UR STRIP.AUTO: 6 [PH] (ref 5–8)
PROT UR QL STRIP: NEGATIVE MG/DL
RBC UR QL AUTO: NORMAL
SP GR UR STRIP.AUTO: 1.01
UROBILINOGEN UR STRIP-MCNC: 0.2 MG/DL

## 2020-05-27 PROCEDURE — 87086 URINE CULTURE/COLONY COUNT: CPT

## 2020-05-27 PROCEDURE — 99214 OFFICE O/P EST MOD 30 MIN: CPT | Performed by: FAMILY MEDICINE

## 2020-05-27 PROCEDURE — 81002 URINALYSIS NONAUTO W/O SCOPE: CPT | Performed by: FAMILY MEDICINE

## 2020-05-27 ASSESSMENT — FIBROSIS 4 INDEX: FIB4 SCORE: 1.07

## 2020-05-27 NOTE — PROGRESS NOTES
"cc:  Urinary frequency    Subjective:     Kiley Foss is a 64 y.o. female presenting for urinary frequency.     Elevated bilirubin  Chronic issue. Bilirubin has been slightly elevated since 2011.  Was 1.9 in August 2011. ruq sonogram from 2019 was significant for fatty liver. Denies any jaundice. Has seen Dr Tinoco in past for colonoscopy.  Requesting further evaluation through her.    Dysuria  Ongoing issue for the last few weeks. Orange-brown urine last week. Today urine color is described as yellow and cloudy.  POCT urine is significant for trace blood.  LMP was a year ago. Endometrial biopsy done last year was negative.     Allergic rhinitis  Chronic issue.  Requesting a new referral to allergist, Dr. Samuel Cantu.  Continues to take Zyrtec, Flonase, and Astelin nasal spray.    Review of systems:  See above and negative for fevers chills  Allergies   Allergen Reactions   • Augmentin Anxiety     Feels like crawling on skin   • Cephalexin Vomiting     Generalized sense of \"not well\" went to Emergency Room   • Pcn [Penicillins] Anxiety   • Sulfa Drugs Anxiety     Sulfite reaction   • Latex Rash     Rash only         Current Outpatient Medications:   •  azelastine (ASTELIN) 137 MCG/SPRAY nasal spray, 1 Spray every day. Rotates with Flonase, Disp: , Rfl: 12  •  fluticasone (FLONASE) 50 MCG/ACT nasal spray, SPRAY 2 SPRAYS IN EACH NOSTRIL EVERY DAY, Disp: 1 Bottle, Rfl: 6  •  Cetirizine HCl (ZYRTEC PO), Take  by mouth every day., Disp: , Rfl:   •  VENTOLIN  (90 Base) MCG/ACT Aero Soln inhalation aerosol, INHALE 2 PUFFS BY MOUTH EVERY 6 HOURS AS NEEDED SHORTNESS OF BREATH, Disp: 18 g, Rfl: 2  •  sertraline (ZOLOFT) 50 MG Tab, TAKE 1 TABLET BY MOUTH EVERY DAY, Disp: 90 Tab, Rfl: 3  •  amLODIPine (NORVASC) 5 MG Tab, Take  by mouth every day. Take 1 tablet by mouth every day, Disp: , Rfl:   •  acetaminophen (TYLENOL) 325 MG Tab, Take 650 mg by mouth every four hours as needed., Disp: , Rfl:   •  Diclofenac " "Sodium (VOLTAREN) 1 % Gel, Apply  to skin as directed as needed. Knee pain, Disp: , Rfl:   •  hydroCHLOROthiazide (HYDRODIURIL) 25 MG Tab, TAKE 1 TABLET BY MOUTH EVERY DAY (Patient taking differently: 25 mg every day.), Disp: 90 Tab, Rfl: 2  •  aspirin EC (ECOTRIN) 81 MG Tablet Delayed Response, Take 81 mg by mouth every day., Disp: , Rfl:   •  cyanocobalamin (VITAMIN B-12) 100 MCG TABS, Take 100 mcg by mouth every day., Disp: , Rfl:   •  vitamin D (CHOLECALCIFEROL) 1000 UNIT TABS, Take 1,000 Units by mouth every day., Disp: , Rfl:   •  Multiple Vitamins-Minerals (WOMENS MULTI VITAMIN & MINERAL PO), Take  by mouth every day., Disp: , Rfl:     Allergies, past medical history, past surgical history, family history, social history reviewed and updated    Objective:     Vitals: /78 (BP Location: Left arm, Patient Position: Sitting, BP Cuff Size: Adult)   Pulse 90   Temp 36.7 °C (98.1 °F) (Temporal)   Resp 20   Ht 1.626 m (5' 4\")   Wt 117 kg (258 lb)   LMP 07/26/2012   SpO2 96%   BMI 44.29 kg/m²   General:  Alert, pleasant, NAD  Eyes:  normal inspection of conjunctivae and lids, EOMI,   ENMT:  External ears and nose are normal.    Neck  supple,   Heart:  Regular rate and rhythm, No LE edema  Respiratory:  Normal respiratory effort, Clear to auscultation bilaterally.  Abdomen:   soft, Non-distended,   Skin:  Warm, dry, no rashes,   Musculoskeletal:  Normal gait, Normal digits and nails.  Neurological: No tremors,   Psych:   Affect/mood is normal, judgement is good, memory is intact, grooming is appropriate.    Assessment/Plan:     Kiley was seen today for follow-up and urinary frequency.    Diagnoses and all orders for this visit:    Dysuria  Ongoing issue.  Not improving.  -     POCT Urinalysis  -     URINE CULTURE(NEW); Future    Elevated bilirubin  Chronic issue. Labs show some improvement.  Patient would like GI referral.  -     REFERRAL TO GASTROENTEROLOGY    Allergic rhinitis due to other allergic " trigger, unspecified seasonality  Chronic issue.  Currently stable.  Requesting new referral to Dr Cantu,  -     REFERRAL TO ALLERGY          Return in about 4 months (around 9/27/2020).  This note was created using voice recognition software (Dragon). The accuracy of the dictation is limited by the abilities of the software. I have reviewed the note prior to signing, however some errors in grammar and context are still possible. If you have any questions related to this note please do not hesitate to contact our office.

## 2020-05-29 DIAGNOSIS — I10 ESSENTIAL HYPERTENSION: ICD-10-CM

## 2020-05-29 NOTE — TELEPHONE ENCOUNTER
Received request via: Pharmacy    Was the patient seen in the last year in this department? Yes on 05/27/2020    Does the patient have an active prescription (recently filled or refills available) for medication(s) requested? NO

## 2020-05-30 LAB
BACTERIA UR CULT: NORMAL
SIGNIFICANT IND 70042: NORMAL
SITE SITE: NORMAL
SOURCE SOURCE: NORMAL

## 2020-06-01 DIAGNOSIS — R31.1 BENIGN ESSENTIAL MICROSCOPIC HEMATURIA: ICD-10-CM

## 2020-06-01 RX ORDER — HYDROCHLOROTHIAZIDE 25 MG/1
TABLET ORAL
Qty: 90 TAB | Refills: 3 | Status: SHIPPED | OUTPATIENT
Start: 2020-06-01 | End: 2021-03-23

## 2020-06-01 NOTE — TELEPHONE ENCOUNTER
Requested Prescriptions     Pending Prescriptions Disp Refills   • hydroCHLOROthiazide (HYDRODIURIL) 25 MG Tab [Pharmacy Med Name: HYDROCHLOROTHIAZIDE 25MG TABLETS] 90 Tab 3     Sig: TAKE 1 TABLET BY MOUTH EVERY DAY   Anne Ryan M.D.

## 2020-06-02 DIAGNOSIS — R17 ELEVATED BILIRUBIN: ICD-10-CM

## 2020-06-16 ENCOUNTER — APPOINTMENT (RX ONLY)
Dept: URBAN - METROPOLITAN AREA CLINIC 4 | Facility: CLINIC | Age: 65
Setting detail: DERMATOLOGY
End: 2020-06-16

## 2020-06-16 DIAGNOSIS — Z71.89 OTHER SPECIFIED COUNSELING: ICD-10-CM

## 2020-06-16 DIAGNOSIS — L81.4 OTHER MELANIN HYPERPIGMENTATION: ICD-10-CM

## 2020-06-16 DIAGNOSIS — L82.1 OTHER SEBORRHEIC KERATOSIS: ICD-10-CM

## 2020-06-16 DIAGNOSIS — D18.0 HEMANGIOMA: ICD-10-CM

## 2020-06-16 DIAGNOSIS — D22 MELANOCYTIC NEVI: ICD-10-CM

## 2020-06-16 PROBLEM — D23.62 OTHER BENIGN NEOPLASM OF SKIN OF LEFT UPPER LIMB, INCLUDING SHOULDER: Status: ACTIVE | Noted: 2020-06-16

## 2020-06-16 PROBLEM — D18.01 HEMANGIOMA OF SKIN AND SUBCUTANEOUS TISSUE: Status: ACTIVE | Noted: 2020-06-16

## 2020-06-16 PROBLEM — D22.9 MELANOCYTIC NEVI, UNSPECIFIED: Status: ACTIVE | Noted: 2020-06-16

## 2020-06-16 PROCEDURE — ? COUNSELING

## 2020-06-16 PROCEDURE — ? SUNSCREEN RECOMMENDATIONS

## 2020-06-16 PROCEDURE — 99213 OFFICE O/P EST LOW 20 MIN: CPT

## 2020-07-01 DIAGNOSIS — Z12.39 SCREENING FOR MALIGNANT NEOPLASM OF BREAST: ICD-10-CM

## 2020-07-01 DIAGNOSIS — R92.30 DENSE BREAST: ICD-10-CM

## 2020-07-01 DIAGNOSIS — R92.2 DENSE BREAST: ICD-10-CM

## 2020-07-23 ENCOUNTER — HOSPITAL ENCOUNTER (OUTPATIENT)
Dept: LAB | Facility: MEDICAL CENTER | Age: 65
End: 2020-07-23
Attending: INTERNAL MEDICINE
Payer: COMMERCIAL

## 2020-07-23 PROCEDURE — 84439 ASSAY OF FREE THYROXINE: CPT

## 2020-07-23 PROCEDURE — 36415 COLL VENOUS BLD VENIPUNCTURE: CPT

## 2020-07-23 PROCEDURE — 84443 ASSAY THYROID STIM HORMONE: CPT

## 2020-07-24 LAB
T4 FREE SERPL-MCNC: 1.28 NG/DL (ref 0.93–1.7)
TSH SERPL DL<=0.005 MIU/L-ACNC: 0.28 UIU/ML (ref 0.38–5.33)

## 2020-08-29 DIAGNOSIS — I10 ESSENTIAL HYPERTENSION: ICD-10-CM

## 2020-08-31 RX ORDER — AMLODIPINE BESYLATE 5 MG/1
TABLET ORAL
Qty: 90 TAB | Refills: 0 | Status: SHIPPED | OUTPATIENT
Start: 2020-08-31 | End: 2020-12-29

## 2020-09-04 DIAGNOSIS — R06.02 SOB (SHORTNESS OF BREATH): ICD-10-CM

## 2020-09-04 RX ORDER — ALBUTEROL SULFATE 2.5 MG/3ML
SOLUTION RESPIRATORY (INHALATION)
Qty: 75 ML | Refills: 0 | Status: SHIPPED | OUTPATIENT
Start: 2020-09-04 | End: 2021-03-23

## 2020-09-04 NOTE — PROGRESS NOTES
Requested Prescriptions     Signed Prescriptions Disp Refills   • albuterol (PROVENTIL) 2.5mg/3ml Nebu Soln solution for nebulization 75 mL 0     Sig: INHALE 1 VIAL BY MOUTH VIA NEBULIZER EVERY 4 HOURS AS NEEDED FOR SHORTNESS OF BREATH   Anne Ryan M.D.

## 2020-09-18 ENCOUNTER — HOSPITAL ENCOUNTER (OUTPATIENT)
Dept: LAB | Facility: MEDICAL CENTER | Age: 65
End: 2020-09-18
Attending: PHYSICIAN ASSISTANT
Payer: COMMERCIAL

## 2020-09-18 ENCOUNTER — HOSPITAL ENCOUNTER (OUTPATIENT)
Dept: LAB | Facility: MEDICAL CENTER | Age: 65
End: 2020-09-18
Attending: INTERNAL MEDICINE
Payer: COMMERCIAL

## 2020-09-18 LAB
ALBUMIN SERPL BCP-MCNC: 4.3 G/DL (ref 3.2–4.9)
ALBUMIN/GLOB SERPL: 1.9 G/DL
ALP SERPL-CCNC: 75 U/L (ref 30–99)
ALT SERPL-CCNC: 28 U/L (ref 2–50)
ANION GAP SERPL CALC-SCNC: 13 MMOL/L (ref 7–16)
AST SERPL-CCNC: 23 U/L (ref 12–45)
BASOPHILS # BLD AUTO: 2.5 % (ref 0–1.8)
BASOPHILS # BLD: 0.14 K/UL (ref 0–0.12)
BILIRUB SERPL-MCNC: 1.5 MG/DL (ref 0.1–1.5)
BUN SERPL-MCNC: 19 MG/DL (ref 8–22)
CALCIUM SERPL-MCNC: 9.3 MG/DL (ref 8.5–10.5)
CHLORIDE SERPL-SCNC: 102 MMOL/L (ref 96–112)
CO2 SERPL-SCNC: 25 MMOL/L (ref 20–33)
CREAT SERPL-MCNC: 0.73 MG/DL (ref 0.5–1.4)
EOSINOPHIL # BLD AUTO: 0.1 K/UL (ref 0–0.51)
EOSINOPHIL NFR BLD: 1.7 % (ref 0–6.9)
ERYTHROCYTE [DISTWIDTH] IN BLOOD BY AUTOMATED COUNT: 44 FL (ref 35.9–50)
FASTING STATUS PATIENT QL REPORTED: NORMAL
GLOBULIN SER CALC-MCNC: 2.3 G/DL (ref 1.9–3.5)
GLUCOSE SERPL-MCNC: 85 MG/DL (ref 65–99)
HCT VFR BLD AUTO: 45.8 % (ref 37–47)
HGB BLD-MCNC: 15.5 G/DL (ref 12–16)
HGB RETIC QN AUTO: 33.4 PG/CELL (ref 29–35)
IMM RETICS NFR: 9.7 % (ref 9.3–17.4)
LYMPHOCYTES # BLD AUTO: 1.6 K/UL (ref 1–4.8)
LYMPHOCYTES NFR BLD: 28 % (ref 22–41)
MANUAL DIFF BLD: ABNORMAL
MCH RBC QN AUTO: 30.7 PG (ref 27–33)
MCHC RBC AUTO-ENTMCNC: 33.8 G/DL (ref 33.6–35)
MCV RBC AUTO: 90.7 FL (ref 81.4–97.8)
MONOCYTES # BLD AUTO: 0.48 K/UL (ref 0–0.85)
MONOCYTES NFR BLD AUTO: 8.5 % (ref 0–13.4)
NEUTROPHILS # BLD AUTO: 3.38 K/UL (ref 2–7.15)
NEUTROPHILS NFR BLD: 59.3 % (ref 44–72)
PLATELET # BLD AUTO: 237 K/UL (ref 164–446)
PMV BLD AUTO: 10.5 FL (ref 9–12.9)
POTASSIUM SERPL-SCNC: 3.7 MMOL/L (ref 3.6–5.5)
PROT SERPL-MCNC: 6.6 G/DL (ref 6–8.2)
RBC # BLD AUTO: 5.05 M/UL (ref 4.2–5.4)
RETICS # AUTO: 0.1 M/UL (ref 0.04–0.06)
RETICS/RBC NFR: 2 % (ref 0.8–2.1)
SODIUM SERPL-SCNC: 140 MMOL/L (ref 135–145)
T3FREE SERPL-MCNC: 3.32 PG/ML (ref 2–4.4)
T4 FREE SERPL-MCNC: 1.31 NG/DL (ref 0.93–1.7)
TSH SERPL DL<=0.005 MIU/L-ACNC: 0.59 UIU/ML (ref 0.38–5.33)
WBC # BLD AUTO: 5.7 K/UL (ref 4.8–10.8)

## 2020-09-18 PROCEDURE — 80053 COMPREHEN METABOLIC PANEL: CPT

## 2020-09-18 PROCEDURE — 84439 ASSAY OF FREE THYROXINE: CPT

## 2020-09-18 PROCEDURE — 85007 BL SMEAR W/DIFF WBC COUNT: CPT

## 2020-09-18 PROCEDURE — 36415 COLL VENOUS BLD VENIPUNCTURE: CPT

## 2020-09-18 PROCEDURE — 80500 HCHG CLINICAL PATH CONSULT-LIMITED: CPT

## 2020-09-18 PROCEDURE — 85046 RETICYTE/HGB CONCENTRATE: CPT

## 2020-09-18 PROCEDURE — 84481 FREE ASSAY (FT-3): CPT

## 2020-09-18 PROCEDURE — 85027 COMPLETE CBC AUTOMATED: CPT

## 2020-09-18 PROCEDURE — 84443 ASSAY THYROID STIM HORMONE: CPT

## 2020-09-19 LAB
PATH REV: NORMAL
PATH REV: NORMAL

## 2020-09-24 ENCOUNTER — HOSPITAL ENCOUNTER (OUTPATIENT)
Dept: RADIOLOGY | Facility: MEDICAL CENTER | Age: 65
End: 2020-09-24
Attending: FAMILY MEDICINE
Payer: COMMERCIAL

## 2020-09-26 PROBLEM — G47.33 OSA (OBSTRUCTIVE SLEEP APNEA): Status: ACTIVE | Noted: 2020-09-26

## 2020-09-26 NOTE — PROGRESS NOTES
"Telemedicine Visit: New Patient     This encounter was conducted via Zoom.   Verbal consent was obtained. Patient's identity was verified.    Subjective:     CC: Evaluation of possible obstructive sleep apnea syndrome    HPI:  Kiley Foss is a 64 y.o. female kindly referred by Anne Ryan M.D. and Dr. Henriquez To MD. Wright to have PVCs. Wakes up with heart \"racing\". Sleeps better with benadryl.      Symptom Summary:  Snoring: +  Very loud snoring: +  Witnessed apneas: +  Resuscitative snorts: +  Nocturnal shortness of breath: +  Non-restorative sleep: +  Insomnia: + sleep onset  Nocturnal awakenings: +  Nocturia: + times 2-3  EDS: +  Fatigue: +  Tiredness: +  Falls asleep accidentally: +   Napping or returning to bed after arising: + times one hour  Restless legs: -  Limb movements during sleep: unknown  Nocturnal headaches: -    Significant comorbidities and modifying factors include knee pain, allergies, rhinitis, sinus issues, palpitations, never smoker, essential hypertension, GERD, dyslipidemia, morbid obesity, asthma, and need for influenza vaccination.      ROS  See HPI  Constitutional: Negative for fever, chills and malaise/fatigue.   HENT: Negative for congestion.    Eyes: Negative for pain.   Respiratory: Negative for cough and shortness of breath.    Cardiovascular: Negative for leg swelling.   Gastrointestinal: Negative for nausea, vomiting, abdominal pain and diarrhea.   Genitourinary: Negative for dysuria and hematuria.   Skin: Negative for rash.   Neurological: Negative for dizziness, focal weakness and headaches.   Endo/Heme/Allergies: Does not bruise/bleed easily.   Psychiatric/Behavioral: Negative for depression.  The patient is not nervous/anxious.      Allergies   Allergen Reactions   • Augmentin Anxiety     Feels like crawling on skin   • Cephalexin Vomiting     Generalized sense of \"not well\" went to Emergency Room   • Pcn [Penicillins] Anxiety   • Sulfa Drugs Anxiety     Sulfite " reaction   • Latex Rash     Rash only       Current medicines (including changes today)  Current Outpatient Medications   Medication Sig Dispense Refill   • zolpidem (AMBIEN) 5 MG Tab Take 1 Tab by mouth at bedtime as needed for Sleep (1 to 2 po qhs prn insomnia/sleep study. Bring to sleep study.) for up to 2 doses. 2 Tab 0   • albuterol (PROVENTIL) 2.5mg/3ml Nebu Soln solution for nebulization INHALE 1 VIAL BY MOUTH VIA NEBULIZER EVERY 4 HOURS AS NEEDED FOR SHORTNESS OF BREATH 75 mL 0   • amLODIPine (NORVASC) 5 MG Tab TAKE 1 TABLET BY MOUTH EVERY DAY 90 Tab 0   • VENTOLIN  (90 Base) MCG/ACT Aero Soln inhalation aerosol INHALE 2 PUFFS BY MOUTH EVERY 6 HOURS AS NEEDED SHORTNESS OF BREATH 18 g 2   • sertraline (ZOLOFT) 50 MG Tab TAKE 1 TABLET BY MOUTH EVERY DAY 90 Tab 3   • acetaminophen (TYLENOL) 325 MG Tab Take 650 mg by mouth every four hours as needed.     • azelastine (ASTELIN) 137 MCG/SPRAY nasal spray 1 Spray every day. Rotates with Flonase  12   • fluticasone (FLONASE) 50 MCG/ACT nasal spray SPRAY 2 SPRAYS IN EACH NOSTRIL EVERY DAY 1 Bottle 6   • vitamin D (CHOLECALCIFEROL) 1000 UNIT TABS Take 1,000 Units by mouth every day.     • Cetirizine HCl (ZYRTEC PO) Take  by mouth every day.     • hydroCHLOROthiazide (HYDRODIURIL) 25 MG Tab TAKE 1 TABLET BY MOUTH EVERY DAY (Patient not taking: Reported on 9/28/2020) 90 Tab 3   • Diclofenac Sodium (VOLTAREN) 1 % Gel Apply  to skin as directed as needed. Knee pain     • aspirin EC (ECOTRIN) 81 MG Tablet Delayed Response Take 81 mg by mouth every day.     • cyanocobalamin (VITAMIN B-12) 100 MCG TABS Take 100 mcg by mouth every day.     • Multiple Vitamins-Minerals (WOMENS MULTI VITAMIN & MINERAL PO) Take  by mouth every day.       No current facility-administered medications for this visit.        She  has a past medical history of Anxiety, Arrhythmia (11/2019), Arthritis (01/21/2020), ASTHMA, Daytime sleepiness, Dyslipidemia, Gasping for breath, GERD  (gastroesophageal reflux disease), Heart burn (2020), Hemorrhage, Hemorrhoids (11/12/15), Hypertension, Infectious disease (2020), Insomnia, Multiple thyroid nodules (2020), Non-seasonal allergic rhinitis, Obesity -- 45 (2011), Pain (2020), RAD (reactive airway disease) (2011), Simple phobia, Sinus infection (2020), Sleep apnea (2020), Snoring, and Urinary incontinence (2020). She also has no past medical history of Apnea, sleep, Breast cancer (HCC), or Morning headache.  She  has a past surgical history that includes tubal coagulation laparoscopic bilateral; tonsillectomy; sinusotomies; biopsy general; salpingectomy; biopsy general (Bilateral, 2020); colonoscopy (N/A, ); and dental extraction(s) (N/A, ).      Family History   Problem Relation Age of Onset   • Hypertension Mother    • Diabetes Mother    • Cancer Father         Non Hodgekin's Lymphoma   • Cancer Sister         cervical   • Cancer Brother         skin   • Heart Disease Maternal Grandfather    • Stroke Maternal Grandfather    • Heart Disease Paternal Grandfather    • Stroke Paternal Grandfather      Family Status   Relation Name Status   • Mo     • Fa  Alive, age 87y   • Sis     • Bro  Alive   • MGFa     • PGFa         Patient Active Problem List    Diagnosis Date Noted   • Non-smoker 2020   • Up to date with influenza vaccination 2020   • EVE (obstructive sleep apnea) 2020   • Dysuria 2020   • Elevated bilirubin 01/15/2020   • Heart palpitations 2019   • Allergic rhinitis 10/30/2018   • Chronic pain of both knees 2018   • Edema extremities 2018   • Low serum vitamin D 2017   • Sedative, hypnotic or anxiolytic abuse, episodic ([facility]) 2017   • Fear of flying 2017   • Morbid obesity with BMI of 40.0-44.9, adult ([facility]) 2017   • Essential hypertension 07/10/2015   • Varicose vein of leg 07/10/2015    • Anxiety    • Hyperlipidemia    • Non-seasonal allergic rhinitis    • ASTHMA    • GERD (gastroesophageal reflux disease)    • Multiple thyroid nodules           Objective:   Vitals obtained by patient:  Respiratory rate 14  Weight 113.4 kg  BMI 42.91 kg/m²      Physical Exam:  Obese  Constitutional: Alert, no distress, well-groomed.  Skin: No rashes in visible areas.  Eye: Round. Conjunctiva clear, lids normal. No icterus.   ENMT: Lips pink without lesions, good dentition, moist mucous membranes. Phonation normal.  Neck: No masses, no thyromegaly. Moves freely without pain.  CV: Pulse as reported by patient  Respiratory: Unlabored respiratory effort, no cough or audible wheeze  Psych: Alert and oriented x3, normal affect and mood.   Musculoskeletal: Chronic knee pain    Assessment and Plan:   The patient has signs and symptoms consistent with obstructive sleep apnea hypopnea syndrome. Will schedule to have a nocturnal polysomnogram using zolpidem to assist with sleep onset and maintenance should the need arise. Will return after the results are available to determine further diagnostic needs and/or treatment options.    The risks of untreated sleep apnea were discussed with the patient at length. Patients with EVE are at increased risk of cardiovascular disease including coronary artery disease, systemic arterial hypertension, pulmonary arterial hypertension, cardiac arrythmias, and stroke. EVE patients have an increased risk of motor vehicle accidents, type 2 diabetes, chronic kidney disease, and non-alcoholic liver disease. The patient was advised to avoid driving a motor vehicle when drowsy.    Positive airway pressure, such as CPAP, is considered first-line and preferred therapy for sleep apnea and may reverse both symptoms and risks.    Have advised the patient to follow up with the appropriate healthcare practitioners for all other medical problems and issues.      The following treatment plan was  discussed:     1. EVE (obstructive sleep apnea)  - zolpidem (AMBIEN) 5 MG Tab; Take 1 Tab by mouth at bedtime as needed for Sleep (1 to 2 po qhs prn insomnia/sleep study. Bring to sleep study.) for up to 2 doses.  Dispense: 2 Tab; Refill: 0  - Polysomnography, 4 or More; Future    2. Essential hypertension    3. Gastroesophageal reflux disease, esophagitis presence not specified    4. Morbid obesity with BMI of 40.0-44.9, adult (HCC)    5. Non-seasonal allergic rhinitis, unspecified trigger    6. Non-smoker    7. Up to date with influenza vaccination      Time spent 45 minutes. More than 1/2 the time spent coordinating care, counseling the patient, and reviewing the pathophysiology and treatment options for sleep apnea and the patient specific modifying factors and significant co-morbidities.        Follow-up: RTC after Sleep Study

## 2020-09-28 ENCOUNTER — TELEMEDICINE (OUTPATIENT)
Dept: SLEEP MEDICINE | Facility: MEDICAL CENTER | Age: 65
End: 2020-09-28
Payer: COMMERCIAL

## 2020-09-28 VITALS — WEIGHT: 250 LBS | HEIGHT: 64 IN | BODY MASS INDEX: 42.68 KG/M2

## 2020-09-28 DIAGNOSIS — Z78.9 NON-SMOKER: ICD-10-CM

## 2020-09-28 DIAGNOSIS — Z92.29 UP TO DATE WITH INFLUENZA VACCINATION: ICD-10-CM

## 2020-09-28 DIAGNOSIS — I10 ESSENTIAL HYPERTENSION: ICD-10-CM

## 2020-09-28 DIAGNOSIS — K21.9 GASTROESOPHAGEAL REFLUX DISEASE, ESOPHAGITIS PRESENCE NOT SPECIFIED: ICD-10-CM

## 2020-09-28 DIAGNOSIS — J30.89 NON-SEASONAL ALLERGIC RHINITIS, UNSPECIFIED TRIGGER: ICD-10-CM

## 2020-09-28 DIAGNOSIS — G47.33 OSA (OBSTRUCTIVE SLEEP APNEA): ICD-10-CM

## 2020-09-28 DIAGNOSIS — E66.01 MORBID OBESITY WITH BMI OF 40.0-44.9, ADULT (HCC): ICD-10-CM

## 2020-09-28 PROCEDURE — 99244 OFF/OP CNSLTJ NEW/EST MOD 40: CPT | Performed by: INTERNAL MEDICINE

## 2020-09-28 RX ORDER — ZOLPIDEM TARTRATE 5 MG/1
5 TABLET ORAL NIGHTLY PRN
Qty: 2 TAB | Refills: 0 | Status: SHIPPED | OUTPATIENT
Start: 2020-09-28 | End: 2020-10-28

## 2020-09-28 ASSESSMENT — FIBROSIS 4 INDEX: FIB4 SCORE: 1.14

## 2020-10-12 ENCOUNTER — HOSPITAL ENCOUNTER (OUTPATIENT)
Dept: RADIOLOGY | Facility: MEDICAL CENTER | Age: 65
End: 2020-10-12
Attending: FAMILY MEDICINE
Payer: COMMERCIAL

## 2020-10-12 DIAGNOSIS — Z12.39 SCREENING FOR MALIGNANT NEOPLASM OF BREAST: ICD-10-CM

## 2020-10-12 DIAGNOSIS — R92.2 DENSE BREAST: ICD-10-CM

## 2020-10-12 DIAGNOSIS — R92.30 DENSE BREAST: ICD-10-CM

## 2020-10-12 PROCEDURE — 76641 ULTRASOUND BREAST COMPLETE: CPT

## 2020-10-12 PROCEDURE — 77067 SCR MAMMO BI INCL CAD: CPT

## 2020-11-12 ENCOUNTER — HOSPITAL ENCOUNTER (OUTPATIENT)
Dept: LAB | Facility: MEDICAL CENTER | Age: 65
End: 2020-11-12
Attending: PHYSICIAN ASSISTANT
Payer: COMMERCIAL

## 2020-11-12 LAB
T4 FREE SERPL-MCNC: 1.34 NG/DL (ref 0.93–1.7)
TSH SERPL DL<=0.005 MIU/L-ACNC: 0.46 UIU/ML (ref 0.38–5.33)

## 2020-11-12 PROCEDURE — 36415 COLL VENOUS BLD VENIPUNCTURE: CPT

## 2020-11-12 PROCEDURE — 84439 ASSAY OF FREE THYROXINE: CPT

## 2020-11-12 PROCEDURE — 84443 ASSAY THYROID STIM HORMONE: CPT

## 2020-12-01 ENCOUNTER — OFFICE VISIT (OUTPATIENT)
Dept: MEDICAL GROUP | Facility: PHYSICIAN GROUP | Age: 65
End: 2020-12-01
Payer: COMMERCIAL

## 2020-12-01 VITALS
RESPIRATION RATE: 20 BRPM | BODY MASS INDEX: 44.3 KG/M2 | WEIGHT: 250 LBS | DIASTOLIC BLOOD PRESSURE: 70 MMHG | OXYGEN SATURATION: 95 % | SYSTOLIC BLOOD PRESSURE: 132 MMHG | TEMPERATURE: 98.1 F | HEIGHT: 63 IN | HEART RATE: 80 BPM

## 2020-12-01 DIAGNOSIS — F13.10: ICD-10-CM

## 2020-12-01 DIAGNOSIS — I10 ESSENTIAL HYPERTENSION: ICD-10-CM

## 2020-12-01 DIAGNOSIS — F41.9 ANXIETY: ICD-10-CM

## 2020-12-01 DIAGNOSIS — E80.4 GILBERT'S DISEASE: ICD-10-CM

## 2020-12-01 PROBLEM — R17 ELEVATED BILIRUBIN: Status: RESOLVED | Noted: 2020-01-15 | Resolved: 2020-12-01

## 2020-12-01 PROCEDURE — 99214 OFFICE O/P EST MOD 30 MIN: CPT | Performed by: FAMILY MEDICINE

## 2020-12-01 RX ORDER — ALPRAZOLAM 0.25 MG/1
0.25 TABLET ORAL
Qty: 10 TAB | Refills: 0 | Status: SHIPPED | OUTPATIENT
Start: 2020-12-01 | End: 2020-12-18 | Stop reason: SDUPTHER

## 2020-12-01 RX ORDER — MELOXICAM 15 MG/1
TABLET ORAL
COMMUNITY
Start: 2020-06-01 | End: 2021-05-19

## 2020-12-01 ASSESSMENT — FIBROSIS 4 INDEX: FIB4 SCORE: 1.16

## 2020-12-01 NOTE — LETTER
[facility] hospital  Anne Ryan M.D.  [address] [place] [address]  Fax: [phone]   Authorization for Release/Disclosure of   Protected Health Information   Name: NIMO PINON : 1955 SSN: [id number]   Address: [address] [place] [address] [address] [place] [address] Phone:    [phone] (home)    I authorize the entity listed below to release/disclose the PHI below to:   [facility]/Anne Ryan M.D. and Anne Ryan M.D.   Provider or Entity Name:  Vitor Dinaa--MD gynecology   Address   City, State, [facility]   Phone:      Fax:     Reason for request: continuity of care   Information to be released:    [  ] LAST COLONOSCOPY,  including any PATH REPORT and follow-up  [  ] LAST FIT/COLOGUARD RESULT [  ] LAST DEXA  [  ] LAST MAMMOGRAM  [y  ] LAST PAP  [  ] LAST LABS [  ] RETINA EXAM REPORT  [  ] IMMUNIZATION RECORDS  [  ] Release all info      [  ] Check here and initial the line next to each item to release ALL health information INCLUDING  _____ Care and treatment for drug and / or alcohol abuse  _____ HIV testing, infection status, or AIDS  _____ Genetic Testing    DATES OF SERVICE OR TIME PERIOD TO BE DISCLOSED: _____________  I understand and acknowledge that:  * This Authorization may be revoked at any time by you in writing, except if your health information has already been used or disclosed.  * Your health information that will be used or disclosed as a result of you signing this authorization could be re-disclosed by the recipient. If this occurs, your re-disclosed health information may no longer be protected by State or Federal laws.  * You may refuse to sign this Authorization. Your refusal will not affect your ability to obtain treatment.  * This Authorization becomes effective upon signing and will  on (date) __________.      If no date is indicated, this Authorization will  one (1) year from the signature date.    Name: Nimo Pinon    Signature:   Date:      12/1/2020       PLEASE FAX REQUESTED RECORDS BACK TO: [phone]

## 2020-12-01 NOTE — PROGRESS NOTES
CC:    Needs xanax refills                                                                                                                    Kiley presents today for med refills and follow up.   Declining hepatitis B. She has several updates from me.    Saw urologist and scheduled for cysto on .  Also has a CT urogram scheduled thru urology, Dr Burnett. Had consultation with sleep medicine and will be scheduled for sleep study.  She saw her gynecologist this year and did have a repeat Pap.  Normal obtain records.  She notes that she is due for colonoscopy this year and will reach out to her gastroenterologist to schedule this test.    Anxiety/ sedative, hypotic or anxiolytic abuse (HCC)  Chronic issue. Was last given xanax 0.25mg , ten tablets, in 2019.  Has 9 tablets left and is concerned that recent prescription has .  States that she recently had her dog down and had to use Xanax at that time.    Obtained and reviewed patient utilization report from [facility] Database on 2020 2:39 PM prior to writing prescriptions for controlled substances.   II, III, IV per [place] Bill .  Based on assessment of the report, the prescription is medically necessary.    Hypertension  Chronic medical condition. BP today is 132/70.  Checks BP at home with range of 125-135 . Currently taking amlodipine 5mg and hctz 25mg.  Denies symptoms of chest pain, headaches, or shortness of breath.     Gilbert's disease  Chronic issue.  She was referred to gastroenterology for further evaluation of chronically elevated bilirubin levels.  Denies jaundice.  Previous labs from May had elevated total bilirubin at 1.6, normal and extra 0.2 and indirect elevated at 1.4.  She had her GI consultation in August and was told these lab findings are due to Gilbert's.      Vaccines discussed with patient and declining Hep B vaccines.     Patient Active Problem List    Diagnosis Date Noted   • Gilbert's disease  12/01/2020   • Non-smoker 09/28/2020   • Up to date with influenza vaccination 09/28/2020   • EVE (obstructive sleep apnea) 09/26/2020   • Dysuria 05/27/2020   • Heart palpitations 08/14/2019   • Allergic rhinitis 10/30/2018   • Chronic pain of both knees 06/20/2018   • Edema extremities 05/30/2018   • Low serum vitamin D 09/19/2017   • Sedative, hypnotic or anxiolytic abuse, episodic ([facility]) 09/19/2017   • Fear of flying 02/21/2017   • Morbid obesity with BMI of 40.0-44.9, adult ([facility]) 02/21/2017   • Essential hypertension 07/10/2015   • Varicose vein of leg 07/10/2015   • Anxiety    • Hyperlipidemia    • Non-seasonal allergic rhinitis    • ASTHMA    • GERD (gastroesophageal reflux disease)    • Multiple thyroid nodules        Current Outpatient Medications   Medication Sig Dispense Refill   • meloxicam (MOBIC) 15 MG tablet      • ALPRAZolam (XANAX) 0.25 MG Tab Take 1 Tab by mouth 1 time daily as needed for Anxiety for up to 10 days. Indications: prn flying 10 Tab 0   • albuterol (PROVENTIL) 2.5mg/3ml Nebu Soln solution for nebulization INHALE 1 VIAL BY MOUTH VIA NEBULIZER EVERY 4 HOURS AS NEEDED FOR SHORTNESS OF BREATH 75 mL 0   • amLODIPine (NORVASC) 5 MG Tab TAKE 1 TABLET BY MOUTH EVERY DAY 90 Tab 0   • hydroCHLOROthiazide (HYDRODIURIL) 25 MG Tab TAKE 1 TABLET BY MOUTH EVERY DAY 90 Tab 3   • VENTOLIN  (90 Base) MCG/ACT Aero Soln inhalation aerosol INHALE 2 PUFFS BY MOUTH EVERY 6 HOURS AS NEEDED SHORTNESS OF BREATH 18 g 2   • sertraline (ZOLOFT) 50 MG Tab TAKE 1 TABLET BY MOUTH EVERY DAY 90 Tab 3   • azelastine (ASTELIN) 137 MCG/SPRAY nasal spray 1 Spray every day. Rotates with Flonase  12   • fluticasone (FLONASE) 50 MCG/ACT nasal spray SPRAY 2 SPRAYS IN EACH NOSTRIL EVERY DAY 1 Bottle 6   • vitamin D (CHOLECALCIFEROL) 1000 UNIT TABS Take 1,000 Units by mouth every day.     • Cetirizine HCl (ZYRTEC PO) Take  by mouth every day.     • Multiple Vitamins-Minerals (WOMENS MULTI VITAMIN & MINERAL PO) Take  by  "mouth every day.     • acetaminophen (TYLENOL) 325 MG Tab Take 650 mg by mouth every four hours as needed.     • Diclofenac Sodium (VOLTAREN) 1 % Gel Apply  to skin as directed as needed. Knee pain     • aspirin EC (ECOTRIN) 81 MG Tablet Delayed Response Take 81 mg by mouth every day.     • cyanocobalamin (VITAMIN B-12) 100 MCG TABS Take 100 mcg by mouth every day.       No current facility-administered medications for this visit.          Allergies as of 12/01/2020 - Reviewed 12/01/2020   Allergen Reaction Noted   • Augmentin Anxiety 01/13/2011   • Cephalexin Vomiting 09/21/2015   • Pcn [penicillins] Anxiety 01/13/2011   • Sulfa drugs Anxiety 12/21/2011   • Latex Rash 01/21/2020          /70 (BP Location: Right arm, Patient Position: Sitting, BP Cuff Size: Adult long)   Pulse 80   Temp 36.7 °C (98.1 °F) (Temporal)   Resp 20   Ht 1.6 m (5' 3\")   Wt 113.4 kg (250 lb)   LMP 07/26/2012   SpO2 95%   BMI 44.29 kg/m²     Physical Exam:  Gen:         Alert and oriented, No apparent distress.  Neck:        supple, No Lymphadenopathy  Lungs:     Clear to auscultation bilaterally  CV:          Regular rate and rhythm. no LE edema             Ext:          No clubbing, cyanosis      Assessment and Plan.   65 y.o. female with the following issues.    Kiley was seen today for follow-up and medication refill.    Diagnoses and all orders for this visit:    Anxiety  Chronic.  Overall stable. Xanax refilled  -     Controlled Substance Treatment Agreement  -     [employer] SCREEN; Future  -     ALPRAZolam (XANAX) 0.25 MG Tab; Take 1 Tab by mouth 1 time daily as needed for Anxiety for up to 10 days. Indications: prn flying    Sedative, hypnotic or anxiolytic abuse, episodic (HCC)  -     Controlled Substance Treatment Agreement  -     [employer] SCREEN; Future    Gilbert's disease  Chronic.  Stable and asymptomatic.  Encouraged to f/u with GI and schedule colonoscopy.    Essential " hypertension  Chronic.  Stable.  D/w patient that goal BP is 120/80.         Follow up: 4 months

## 2020-12-16 ENCOUNTER — PATIENT MESSAGE (OUTPATIENT)
Dept: MEDICAL GROUP | Facility: PHYSICIAN GROUP | Age: 65
End: 2020-12-16

## 2020-12-16 DIAGNOSIS — F41.9 ANXIETY: ICD-10-CM

## 2020-12-18 RX ORDER — ALPRAZOLAM 0.25 MG/1
0.25 TABLET ORAL
Qty: 10 TAB | Refills: 0 | Status: SHIPPED | OUTPATIENT
Start: 2020-12-18 | End: 2021-01-02

## 2020-12-18 NOTE — TELEPHONE ENCOUNTER
Requested Prescriptions     Pending Prescriptions Disp Refills   • ALPRAZolam (XANAX) 0.25 MG Tab 10 Tab 0     Sig: Take 1 Tab by mouth 1 time a day as needed for Anxiety for up to 15 days. Indications: prn flying   Obtained and reviewed patient utilization report from [facility] Database on 12/18/2020 12:39 PM prior to writing prescriptions for controlled substances.   II, III, IV per [place] Bill .  Based on assessment of the report, the prescription is medically necessary.  Anne Ryan M.D.

## 2020-12-26 DIAGNOSIS — I10 ESSENTIAL HYPERTENSION: ICD-10-CM

## 2020-12-29 RX ORDER — AMLODIPINE BESYLATE 5 MG/1
TABLET ORAL
Qty: 90 TAB | Refills: 0 | Status: SHIPPED | OUTPATIENT
Start: 2020-12-29 | End: 2021-04-07 | Stop reason: SDUPTHER

## 2020-12-29 NOTE — TELEPHONE ENCOUNTER
Requested Prescriptions     Signed Prescriptions Disp Refills   • amLODIPine (NORVASC) 5 MG Tab 90 Tab 0     Sig: TAKE 1 TABLET BY MOUTH EVERY DAY     Authorizing Provider: KAREEM GARCIA A.P.R.N.

## 2021-01-04 ENCOUNTER — APPOINTMENT (RX ONLY)
Dept: URBAN - METROPOLITAN AREA CLINIC 4 | Facility: CLINIC | Age: 66
Setting detail: DERMATOLOGY
End: 2021-01-04

## 2021-01-04 DIAGNOSIS — L20.89 OTHER ATOPIC DERMATITIS: ICD-10-CM

## 2021-01-04 DIAGNOSIS — L82.1 OTHER SEBORRHEIC KERATOSIS: ICD-10-CM

## 2021-01-04 DIAGNOSIS — Z71.89 OTHER SPECIFIED COUNSELING: ICD-10-CM

## 2021-01-04 DIAGNOSIS — L57.0 ACTINIC KERATOSIS: ICD-10-CM

## 2021-01-04 DIAGNOSIS — D18.0 HEMANGIOMA: ICD-10-CM

## 2021-01-04 DIAGNOSIS — L81.4 OTHER MELANIN HYPERPIGMENTATION: ICD-10-CM

## 2021-01-04 DIAGNOSIS — D22 MELANOCYTIC NEVI: ICD-10-CM

## 2021-01-04 PROBLEM — D18.01 HEMANGIOMA OF SKIN AND SUBCUTANEOUS TISSUE: Status: ACTIVE | Noted: 2021-01-04

## 2021-01-04 PROBLEM — D22.9 MELANOCYTIC NEVI, UNSPECIFIED: Status: ACTIVE | Noted: 2021-01-04

## 2021-01-04 PROBLEM — L20.84 INTRINSIC (ALLERGIC) ECZEMA: Status: ACTIVE | Noted: 2021-01-04

## 2021-01-04 PROCEDURE — 17000 DESTRUCT PREMALG LESION: CPT

## 2021-01-04 PROCEDURE — 99213 OFFICE O/P EST LOW 20 MIN: CPT | Mod: 25

## 2021-01-04 PROCEDURE — ? LIQUID NITROGEN

## 2021-01-04 PROCEDURE — ? SUNSCREEN RECOMMENDATIONS

## 2021-01-04 PROCEDURE — ? COUNSELING

## 2021-01-04 PROCEDURE — ? PRESCRIPTION

## 2021-01-04 RX ORDER — TRIAMCINOLONE ACETONIDE 1 MG/G
CREAM TOPICAL BID
Qty: 1 | Refills: 2 | Status: ERX | COMMUNITY
Start: 2021-01-04

## 2021-01-04 RX ADMIN — TRIAMCINOLONE ACETONIDE 1: 1 CREAM TOPICAL at 00:00

## 2021-01-04 ASSESSMENT — LOCATION SIMPLE DESCRIPTION DERM
LOCATION SIMPLE: LEFT EYELID
LOCATION SIMPLE: LEFT LOWER BACK

## 2021-01-04 ASSESSMENT — LOCATION ZONE DERM
LOCATION ZONE: TRUNK
LOCATION ZONE: EYELID

## 2021-01-04 ASSESSMENT — LOCATION DETAILED DESCRIPTION DERM
LOCATION DETAILED: LEFT LATERAL CANTHUS
LOCATION DETAILED: LEFT SUPERIOR LATERAL LOWER BACK

## 2021-01-04 NOTE — PROCEDURE: LIQUID NITROGEN
Post-Care Instructions: I reviewed with the patient in detail post-care instructions. Patient is to wear sunprotection, and avoid picking at any of the treated lesions. Pt may apply Vaseline to crusted or scabbing areas.
Render Post-Care Instructions In Note?: no
Detail Level: Simple
Duration Of Freeze Thaw-Cycle (Seconds): 2
Number Of Freeze-Thaw Cycles: 2 freeze-thaw cycles
Consent: The patient's consent was obtained including but not limited to risks of crusting, scabbing, blistering, scarring, darker or lighter pigmentary change, recurrence, incomplete removal and infection.

## 2021-01-13 ENCOUNTER — HOSPITAL ENCOUNTER (OUTPATIENT)
Dept: RADIOLOGY | Facility: MEDICAL CENTER | Age: 66
End: 2021-01-13
Attending: UROLOGY
Payer: COMMERCIAL

## 2021-01-20 ENCOUNTER — HOSPITAL ENCOUNTER (OUTPATIENT)
Dept: LAB | Facility: MEDICAL CENTER | Age: 66
End: 2021-01-20
Attending: UROLOGY
Payer: COMMERCIAL

## 2021-01-20 PROCEDURE — 36415 COLL VENOUS BLD VENIPUNCTURE: CPT

## 2021-01-20 PROCEDURE — 84520 ASSAY OF UREA NITROGEN: CPT

## 2021-01-20 PROCEDURE — 82565 ASSAY OF CREATININE: CPT

## 2021-01-21 LAB
BUN SERPL-MCNC: 18 MG/DL (ref 8–22)
CREAT SERPL-MCNC: 0.68 MG/DL (ref 0.5–1.4)

## 2021-01-25 ENCOUNTER — HOSPITAL ENCOUNTER (OUTPATIENT)
Dept: RADIOLOGY | Facility: MEDICAL CENTER | Age: 66
End: 2021-01-25
Attending: UROLOGY
Payer: COMMERCIAL

## 2021-01-25 DIAGNOSIS — R31.21 ASYMPTOMATIC MICROSCOPIC HEMATURIA: ICD-10-CM

## 2021-01-25 PROCEDURE — 700117 HCHG RX CONTRAST REV CODE 255: Performed by: UROLOGY

## 2021-01-25 PROCEDURE — 74178 CT ABD&PLV WO CNTR FLWD CNTR: CPT

## 2021-01-25 RX ADMIN — IOHEXOL 100 ML: 350 INJECTION, SOLUTION INTRAVENOUS at 16:00

## 2021-02-11 ENCOUNTER — HOSPITAL ENCOUNTER (OUTPATIENT)
Dept: LAB | Facility: MEDICAL CENTER | Age: 66
End: 2021-02-11
Attending: INTERNAL MEDICINE
Payer: COMMERCIAL

## 2021-02-11 LAB
T4 FREE SERPL-MCNC: 1.39 NG/DL (ref 0.93–1.7)
TSH SERPL DL<=0.005 MIU/L-ACNC: 0.27 UIU/ML (ref 0.38–5.33)

## 2021-02-11 PROCEDURE — 84439 ASSAY OF FREE THYROXINE: CPT

## 2021-02-11 PROCEDURE — 84443 ASSAY THYROID STIM HORMONE: CPT

## 2021-02-11 PROCEDURE — 36415 COLL VENOUS BLD VENIPUNCTURE: CPT

## 2021-03-01 ENCOUNTER — HOSPITAL ENCOUNTER (OUTPATIENT)
Dept: LAB | Facility: MEDICAL CENTER | Age: 66
End: 2021-03-01
Attending: INTERNAL MEDICINE
Payer: COMMERCIAL

## 2021-03-01 PROCEDURE — 84443 ASSAY THYROID STIM HORMONE: CPT

## 2021-03-01 PROCEDURE — 84439 ASSAY OF FREE THYROXINE: CPT

## 2021-03-01 PROCEDURE — 36415 COLL VENOUS BLD VENIPUNCTURE: CPT

## 2021-03-01 PROCEDURE — 84481 FREE ASSAY (FT-3): CPT

## 2021-03-02 LAB
T3FREE SERPL-MCNC: 3.36 PG/ML (ref 2–4.4)
T4 FREE SERPL-MCNC: 1.38 NG/DL (ref 0.93–1.7)
TSH SERPL DL<=0.005 MIU/L-ACNC: 0.15 UIU/ML (ref 0.38–5.33)

## 2021-03-10 ENCOUNTER — APPOINTMENT (RX ONLY)
Dept: URBAN - METROPOLITAN AREA CLINIC 22 | Facility: CLINIC | Age: 66
Setting detail: DERMATOLOGY
End: 2021-03-10

## 2021-03-10 DIAGNOSIS — L57.0 ACTINIC KERATOSIS: ICD-10-CM

## 2021-03-10 DIAGNOSIS — L57.8 OTHER SKIN CHANGES DUE TO CHRONIC EXPOSURE TO NONIONIZING RADIATION: ICD-10-CM

## 2021-03-10 DIAGNOSIS — L24.9 IRRITANT CONTACT DERMATITIS, UNSPECIFIED CAUSE: ICD-10-CM

## 2021-03-10 PROCEDURE — 17000 DESTRUCT PREMALG LESION: CPT

## 2021-03-10 PROCEDURE — ? PHOTO-DOCUMENTATION

## 2021-03-10 PROCEDURE — 99213 OFFICE O/P EST LOW 20 MIN: CPT | Mod: 25

## 2021-03-10 PROCEDURE — ? COUNSELING

## 2021-03-10 PROCEDURE — ? LIQUID NITROGEN

## 2021-03-10 PROCEDURE — ? PRESCRIPTION

## 2021-03-10 RX ORDER — HYDROCORTISONE 25 MG/G
CREAM TOPICAL
Qty: 1 | Refills: 0 | Status: ERX | COMMUNITY
Start: 2021-03-10

## 2021-03-10 RX ADMIN — HYDROCORTISONE 1: 25 CREAM TOPICAL at 00:00

## 2021-03-10 ASSESSMENT — LOCATION SIMPLE DESCRIPTION DERM
LOCATION SIMPLE: LEFT CHEEK
LOCATION SIMPLE: RIGHT CHEEK
LOCATION SIMPLE: LEFT SUPERIOR EYELID

## 2021-03-10 ASSESSMENT — LOCATION ZONE DERM
LOCATION ZONE: FACE
LOCATION ZONE: EYELID

## 2021-03-10 ASSESSMENT — LOCATION DETAILED DESCRIPTION DERM
LOCATION DETAILED: LEFT INFERIOR CENTRAL MALAR CHEEK
LOCATION DETAILED: RIGHT SUPERIOR MEDIAL MALAR CHEEK
LOCATION DETAILED: LEFT MEDIAL SUPERIOR EYELID
LOCATION DETAILED: RIGHT SUPERIOR MEDIAL BUCCAL CHEEK

## 2021-03-15 DIAGNOSIS — Z23 NEED FOR VACCINATION: ICD-10-CM

## 2021-03-23 DIAGNOSIS — R06.02 SOB (SHORTNESS OF BREATH): ICD-10-CM

## 2021-03-23 DIAGNOSIS — I10 ESSENTIAL HYPERTENSION: ICD-10-CM

## 2021-03-23 RX ORDER — HYDROCHLOROTHIAZIDE 25 MG/1
25 TABLET ORAL DAILY
Qty: 90 TABLET | Refills: 0 | Status: SHIPPED | OUTPATIENT
Start: 2021-03-23 | End: 2021-06-24

## 2021-03-23 RX ORDER — ALBUTEROL SULFATE 90 UG/1
AEROSOL, METERED RESPIRATORY (INHALATION)
Qty: 18 G | Refills: 2 | Status: SHIPPED | OUTPATIENT
Start: 2021-03-23 | End: 2022-03-29 | Stop reason: SDUPTHER

## 2021-03-23 NOTE — TELEPHONE ENCOUNTER
Requested Prescriptions     Pending Prescriptions Disp Refills   • VENTOLIN  (90 Base) MCG/ACT Aero Soln inhalation aerosol [Pharmacy Med Name: VENTOLIN HFA INH W/DOS CTR 200PUFFS] 18 g 2     Sig: INHALE 2 PUFFS BY MOUTH EVERY 6 HOURS AS NEEDED FOR SHORTNESS OF BREATH   • hydroCHLOROthiazide (HYDRODIURIL) 25 MG Tab [Pharmacy Med Name: HYDROCHLOROTHIAZIDE 25MG TABLETS] 90 tablet 0     Sig: Take 1 tablet by mouth every day. LAST REFILL: Needs to keep upcoming appointment with a new PCP for future refills   Anne Ryan M.D.

## 2021-03-23 NOTE — TELEPHONE ENCOUNTER
Pt is scheduled to establish care with Oralia Maciel on 03/30/21    Received request via: Pharmacy    Was the patient seen in the last year in this department? Yes LOV 12/01/2020    Does the patient have an active prescription (recently filled or refills available) for medication(s) requested? No

## 2021-03-30 ENCOUNTER — TELEMEDICINE (OUTPATIENT)
Dept: MEDICAL GROUP | Facility: PHYSICIAN GROUP | Age: 66
End: 2021-03-30
Payer: COMMERCIAL

## 2021-03-30 VITALS — HEIGHT: 63 IN | BODY MASS INDEX: 43.41 KG/M2 | RESPIRATION RATE: 14 BRPM | WEIGHT: 245 LBS

## 2021-03-30 DIAGNOSIS — R31.29 MICROSCOPIC HEMATURIA: ICD-10-CM

## 2021-03-30 DIAGNOSIS — Z13.6 SCREENING FOR CARDIOVASCULAR CONDITION: ICD-10-CM

## 2021-03-30 DIAGNOSIS — F41.1 GAD (GENERALIZED ANXIETY DISORDER): ICD-10-CM

## 2021-03-30 DIAGNOSIS — I10 ESSENTIAL HYPERTENSION: ICD-10-CM

## 2021-03-30 DIAGNOSIS — Z13.1 ENCOUNTER FOR SCREENING FOR DIABETES MELLITUS: ICD-10-CM

## 2021-03-30 DIAGNOSIS — E05.90 SUBCLINICAL HYPERTHYROIDISM: ICD-10-CM

## 2021-03-30 DIAGNOSIS — E80.4 GILBERT'S DISEASE: ICD-10-CM

## 2021-03-30 PROCEDURE — 99204 OFFICE O/P NEW MOD 45 MIN: CPT | Performed by: INTERNAL MEDICINE

## 2021-03-30 RX ORDER — ALPRAZOLAM 0.25 MG/1
0.25 TABLET ORAL NIGHTLY PRN
Qty: 10 TABLET | Refills: 0 | Status: SHIPPED | OUTPATIENT
Start: 2021-03-30 | End: 2021-04-09

## 2021-03-30 RX ORDER — ALPRAZOLAM 0.25 MG/1
0.25 TABLET ORAL
COMMUNITY
End: 2021-03-30

## 2021-03-30 ASSESSMENT — PATIENT HEALTH QUESTIONNAIRE - PHQ9: CLINICAL INTERPRETATION OF PHQ2 SCORE: 0

## 2021-03-30 ASSESSMENT — FIBROSIS 4 INDEX: FIB4 SCORE: 1.16

## 2021-03-30 NOTE — PROGRESS NOTES
"Virtual Visit: New Patient   This visit was conducted via Zoom using secure and encrypted videoconferencing technology. The patient was in a private location in the state of [place].    The patient's identity was confirmed and verbal consent was obtained for this virtual visit.    Subjective:     CC: Establish care    Kiley Foss is a 65 y.o. female presenting to establish care and to discuss the evaluation and management of:    The patient states she feels well.  She did develop a bit of a sore throat and weird cough following her colonoscopy, likely secondary to oxygen nasal cannula during the procedure.    ROS:   Gen: no fevers/chills  Pulm: no sob, no cough  CV: no chest pain, no palpitations  GI: no nausea/vomiting, no diarrhea  Neuro: no headaches, no numbness/tingling    Allergies   Allergen Reactions   • Augmentin Anxiety     Feels like crawling on skin   • Cephalexin Vomiting     Generalized sense of \"not well\" went to Emergency Room   • Pcn [Penicillins] Anxiety   • Sulfa Drugs Anxiety     Sulfite reaction   • Latex Rash     Rash only       Current medicines (including changes today)  Current Outpatient Medications   Medication Sig Dispense Refill   • ALPRAZolam (XANAX) 0.25 MG Tab Take 1 tablet by mouth at bedtime as needed for Anxiety for up to 10 days. 10 tablet 0   • VENTOLIN  (90 Base) MCG/ACT Aero Soln inhalation aerosol INHALE 2 PUFFS BY MOUTH EVERY 6 HOURS AS NEEDED FOR SHORTNESS OF BREATH 18 g 2   • hydroCHLOROthiazide (HYDRODIURIL) 25 MG Tab Take 1 tablet by mouth every day. LAST REFILL: Needs to keep upcoming appointment with a new PCP for future refills 90 tablet 0   • amLODIPine (NORVASC) 5 MG Tab TAKE 1 TABLET BY MOUTH EVERY DAY 90 Tab 0   • meloxicam (MOBIC) 15 MG tablet      • sertraline (ZOLOFT) 50 MG Tab TAKE 1 TABLET BY MOUTH EVERY DAY 90 Tab 3   • acetaminophen (TYLENOL) 325 MG Tab Take 650 mg by mouth every four hours as needed.     • Diclofenac Sodium (VOLTAREN) 1 % " Gel Apply  to skin as directed as needed. Knee pain     • azelastine (ASTELIN) 137 MCG/SPRAY nasal spray 1 Spray every day. Rotates with Flonase  12   • aspirin EC (ECOTRIN) 81 MG Tablet Delayed Response Take 81 mg by mouth every day.     • fluticasone (FLONASE) 50 MCG/ACT nasal spray SPRAY 2 SPRAYS IN EACH NOSTRIL EVERY DAY 1 Bottle 6   • Cetirizine HCl (ZYRTEC PO) Take  by mouth every day.     • cyanocobalamin (VITAMIN B-12) 100 MCG TABS Take 100 mcg by mouth every day.     • vitamin D (CHOLECALCIFEROL) 1000 UNIT TABS Take 1,000 Units by mouth every day.     • Multiple Vitamins-Minerals (WOMENS MULTI VITAMIN & MINERAL PO) Take  by mouth every day.       No current facility-administered medications for this visit.       She  has a past medical history of Anxiety, Arrhythmia (11/2019), Arthritis (01/21/2020), ASTHMA, Daytime sleepiness, Dyslipidemia, Gasping for breath, GERD (gastroesophageal reflux disease), Heart burn (01/21/2020), Hemorrhage, Hemorrhoids (11/12/15), Hypertension, Infectious disease (01/21/2020), Insomnia, Multiple thyroid nodules (01/21/2020), Non-seasonal allergic rhinitis, Obesity -- 45 (7/27/2011), Pain (01/21/2020), RAD (reactive airway disease) (7/1/2011), Simple phobia, Sinus infection (01/21/2020), Sleep apnea (01/21/2020), Snoring, and Urinary incontinence (01/21/2020). She also has no past medical history of Apnea, sleep, Breast cancer (HCC), or Morning headache.  She  has a past surgical history that includes tubal coagulation laparoscopic bilateral; tonsillectomy; sinusotomies; biopsy general; salpingectomy; biopsy general (Bilateral, 01/08/2020); colonoscopy (N/A, 2016); and dental extraction(s) (N/A, 1980's).      Family History   Problem Relation Age of Onset   • Hypertension Mother    • Diabetes Mother    • Cancer Father         Non Hodgekin's Lymphoma   • Cancer Sister         cervical   • Cancer Brother         skin   • Heart Disease Maternal Grandfather    • Stroke Maternal  "Grandfather    • Heart Disease Paternal Grandfather    • Stroke Paternal Grandfather      Family Status   Relation Name Status   • Mo     • Fa  Alive, age 88y   • Sis     • Bro  Alive   • MGFa     • PGFa         Patient Active Problem List    Diagnosis Date Noted   • Subclinical hyperthyroidism 2021   • Microscopic hematuria 2021   • Gilbert's disease 2020   • Non-smoker 2020   • EVE (obstructive sleep apnea) 2020   • Dysuria 2020   • Heart palpitations 2019   • Allergic rhinitis 10/30/2018   • Chronic pain of both knees 2018   • Edema extremities 2018   • Low serum vitamin D 2017   • Sedative, hypnotic or anxiolytic abuse, episodic ([facility]) 2017   • Fear of flying 2017   • Morbid obesity with BMI of 40.0-44.9, adult ([facility]) 2017   • Essential hypertension 07/10/2015   • Varicose vein of leg 07/10/2015   • REINALDO (generalized anxiety disorder)    • Hyperlipidemia    • Non-seasonal allergic rhinitis    • ASTHMA    • GERD (gastroesophageal reflux disease)    • Multiple thyroid nodules           Objective:   Resp 14   Ht 1.6 m (5' 3\")   Wt 111 kg (245 lb)   LMP 2012   BMI 43.40 kg/m²     Physical Exam:  Constitutional: Alert, no distress, well-groomed.  Skin: No rashes in visible areas.  Eye: Round. Conjunctiva clear, lids normal. No icterus.   ENMT: Lips pink without lesions, good dentition, moist mucous membranes. Phonation normal.  Neck: No masses, no thyromegaly. Moves freely without pain.  Respiratory: Unlabored respiratory effort, no cough or audible wheeze  Psych: Alert and oriented x3, normal affect and mood.       Assessment and Plan:   The following treatment plan was discussed:     Essential hypertension  This is a chronic problem.  Stable.  Patient is on amlodipine 5 mg daily and HCTZ 25 mg daily. Does not measure home BPs.   -Continue HCTZ 25 mg daily  -Continue amlodipine 5 mg daily  - CBC " WITHOUT DIFFERENTIAL; Future    REINALDO (generalized anxiety disorder)  This is a chronic problem.  Stable.  The patient is on sertraline 50 mg daily. Obtained and reviewed patient utilization report from [facility] [place] [facility] pharmacy database on 3/30/2021 2:31 PM  prior to writing prescription for controlled substance II, III or IV per [place] bill . Based on assessment of the report, the prescription is medically necessary.   -Continue sertraline 50 mg daily  -Continue alprazolam 0.25 mg as needed for anxiety  - ALPRAZolam (XANAX) 0.25 MG Tab; Take 1 tablet by mouth at bedtime as needed for Anxiety for up to 10 days.  Dispense: 10 tablet; Refill: 0    Gilbert's disease  This is a chronic problem.  Stable.  She has a chronically elevated indirect bilirubin which is likely due to Gilbert's disease.    Subclinical hyperthyroidism  This is a chronic problem.  Stable.  Labs from 3/1/2021 showed a low TSH of 0.15 with a normal free T4 of 1.38.  She had a normal nuclear scan in 2019.  She is followed by Dr. Collins.  She is scheduled for repeat nuclear scan 4/5/2021.   - TRIIDOTHYRONINE; Future  - TSH; Future  - FREE THYROXINE; Future    Microscopic hematuria  This is a chronic problem.  Stable.  CT abdomen and pelvis 1/25/2021 showed a 12 mm nonenhancing cyst in the left kidney and 2 mm nonobstructing left kidney stone.  She is scheduled for cystoscopy.      Screening for cardiovascular condition  - Lipid Profile; Future    Encounter for screening for diabetes mellitus  - Comp Metabolic Panel; Future      Follow-up: Return in about 6 months (around 9/30/2021) for f/u labs.     Please note that this dictation was created using voice recognition software. I have made every reasonable attempt to correct obvious errors, but I expect that there are errors of grammar and possibly content that I did not discover before finalizing the note.

## 2021-04-05 ENCOUNTER — HOSPITAL ENCOUNTER (OUTPATIENT)
Dept: RADIOLOGY | Facility: MEDICAL CENTER | Age: 66
End: 2021-04-05
Attending: INTERNAL MEDICINE
Payer: COMMERCIAL

## 2021-04-05 DIAGNOSIS — E04.2 NONTOXIC MULTINODULAR GOITER: ICD-10-CM

## 2021-04-05 PROCEDURE — 78014 THYROID IMAGING W/BLOOD FLOW: CPT

## 2021-04-19 ENCOUNTER — HOSPITAL ENCOUNTER (OUTPATIENT)
Dept: LAB | Facility: MEDICAL CENTER | Age: 66
End: 2021-04-19
Attending: INTERNAL MEDICINE
Payer: COMMERCIAL

## 2021-04-19 LAB
T3 SERPL-MCNC: 107 NG/DL (ref 60–181)
T4 FREE SERPL-MCNC: 1.4 NG/DL (ref 0.93–1.7)
TSH SERPL DL<=0.005 MIU/L-ACNC: 0.14 UIU/ML (ref 0.38–5.33)

## 2021-04-19 PROCEDURE — 84443 ASSAY THYROID STIM HORMONE: CPT

## 2021-04-19 PROCEDURE — 84439 ASSAY OF FREE THYROXINE: CPT

## 2021-04-19 PROCEDURE — 36415 COLL VENOUS BLD VENIPUNCTURE: CPT

## 2021-04-19 PROCEDURE — 84480 ASSAY TRIIODOTHYRONINE (T3): CPT

## 2021-04-23 ENCOUNTER — PATIENT MESSAGE (OUTPATIENT)
Dept: MEDICAL GROUP | Facility: PHYSICIAN GROUP | Age: 66
End: 2021-04-23

## 2021-04-23 NOTE — PROGRESS NOTES
Spoke with patient today. Advised her to take the methimazole as prescribed by Dr. Collins.  She will likely do well and avoid progression to thyroid storm.  Reassured the patient about this treatment and encouraged her to call with any further questions.

## 2021-04-28 ENCOUNTER — TELEPHONE (OUTPATIENT)
Dept: SLEEP MEDICINE | Facility: MEDICAL CENTER | Age: 66
End: 2021-04-28

## 2021-04-28 DIAGNOSIS — G47.33 OSA (OBSTRUCTIVE SLEEP APNEA): ICD-10-CM

## 2021-04-28 NOTE — TELEPHONE ENCOUNTER
The patient called and stated that she has family issues right now and cannot get away for a sleep study.  She is asking if she is a candidate for a home sleep study.  Please advise, thank you.

## 2021-04-30 NOTE — TELEPHONE ENCOUNTER
Dr. Hodges signed an order for a home sleep study for the patient.  Would you call and get her set up to come and pick one up?  Thank you.

## 2021-04-30 NOTE — TELEPHONE ENCOUNTER
routed conversation to Jj Hodges M.D. Yesterday (10:38 AM)   Jj Hodges M.D.  You 2 days ago     Yes she can.     Message text

## 2021-05-11 ENCOUNTER — HOSPITAL ENCOUNTER (OUTPATIENT)
Dept: LAB | Facility: MEDICAL CENTER | Age: 66
End: 2021-05-11
Attending: INTERNAL MEDICINE
Payer: COMMERCIAL

## 2021-05-11 DIAGNOSIS — Z13.6 SCREENING FOR CARDIOVASCULAR CONDITION: ICD-10-CM

## 2021-05-11 DIAGNOSIS — I10 ESSENTIAL HYPERTENSION: ICD-10-CM

## 2021-05-11 DIAGNOSIS — Z13.1 ENCOUNTER FOR SCREENING FOR DIABETES MELLITUS: ICD-10-CM

## 2021-05-11 LAB
ALBUMIN SERPL BCP-MCNC: 4.3 G/DL (ref 3.2–4.9)
ALBUMIN/GLOB SERPL: 1.7 G/DL
ALP SERPL-CCNC: 75 U/L (ref 30–99)
ALT SERPL-CCNC: 15 U/L (ref 2–50)
ANION GAP SERPL CALC-SCNC: 8 MMOL/L (ref 7–16)
AST SERPL-CCNC: 15 U/L (ref 12–45)
BILIRUB SERPL-MCNC: 1.3 MG/DL (ref 0.1–1.5)
BUN SERPL-MCNC: 16 MG/DL (ref 8–22)
CALCIUM SERPL-MCNC: 9.6 MG/DL (ref 8.5–10.5)
CHLORIDE SERPL-SCNC: 105 MMOL/L (ref 96–112)
CHOLEST SERPL-MCNC: 189 MG/DL (ref 100–199)
CO2 SERPL-SCNC: 29 MMOL/L (ref 20–33)
CREAT SERPL-MCNC: 0.68 MG/DL (ref 0.5–1.4)
ERYTHROCYTE [DISTWIDTH] IN BLOOD BY AUTOMATED COUNT: 44.1 FL (ref 35.9–50)
FASTING STATUS PATIENT QL REPORTED: NORMAL
GLOBULIN SER CALC-MCNC: 2.5 G/DL (ref 1.9–3.5)
GLUCOSE SERPL-MCNC: 91 MG/DL (ref 65–99)
HCT VFR BLD AUTO: 48.3 % (ref 37–47)
HDLC SERPL-MCNC: 53 MG/DL
HGB BLD-MCNC: 15.8 G/DL (ref 12–16)
LDLC SERPL CALC-MCNC: 116 MG/DL
MCH RBC QN AUTO: 29.9 PG (ref 27–33)
MCHC RBC AUTO-ENTMCNC: 32.7 G/DL (ref 33.6–35)
MCV RBC AUTO: 91.3 FL (ref 81.4–97.8)
PLATELET # BLD AUTO: 235 K/UL (ref 164–446)
PMV BLD AUTO: 11 FL (ref 9–12.9)
POTASSIUM SERPL-SCNC: 3.8 MMOL/L (ref 3.6–5.5)
PROT SERPL-MCNC: 6.8 G/DL (ref 6–8.2)
RBC # BLD AUTO: 5.29 M/UL (ref 4.2–5.4)
SODIUM SERPL-SCNC: 142 MMOL/L (ref 135–145)
TRIGL SERPL-MCNC: 98 MG/DL (ref 0–149)
WBC # BLD AUTO: 6.1 K/UL (ref 4.8–10.8)

## 2021-05-11 PROCEDURE — 36415 COLL VENOUS BLD VENIPUNCTURE: CPT

## 2021-05-11 PROCEDURE — 80061 LIPID PANEL: CPT

## 2021-05-11 PROCEDURE — 80053 COMPREHEN METABOLIC PANEL: CPT

## 2021-05-11 PROCEDURE — 85027 COMPLETE CBC AUTOMATED: CPT

## 2021-05-13 NOTE — PROGRESS NOTES
Subjective:     CC: Rash    HPI:   Kiley presents today for a rash.  She states she has a scar from a prior laparoscopic salpingectomy due to ectopic pregnancy on her lower abdomen.  She developed some bright redness around the scar.  She tried putting Neosporin on it which seemed to make it worse.  She then began putting on a combination of yeast infection medicine and hydrocortisone and it is now improving.    The patient is followed by Dr. Collins for her subclinical hyperthyroidism.  Since her last visit she was started on methimazole 2.5 mg daily.  She states she is doing well on the medication.  She has not had any side effects from it.  She states that she thinks she may be having a little less brain fog since starting the medication.  She has follow-up 6/28/2021.    For her microscopic hematuria, the patient was evaluated by Urology.  CT urogram on 1/25/2021 showed a 12 mm nonenhancing cyst on the upper pole of the left kidney and a 2 mm nonobstructive left kidney stone.  She was told the movement of the stone could cause intermittent microhematuria.  She had a cystoscopy that indicated no urologic abnormalities.  They would like her to follow-up in 1 year with a KUB.    She also reports being on meloxicam 7.5 to 15 mg for chronic, severe bilateral knee osteoarthritis.  She eventually hopes to have surgery when her thyroid is better treated.  She states that her orthopedic surgeon does not feel comfortable prescribing the meloxicam on a daily basis and would prefer that her PCP prescribe it so that her renal function can be monitored.      Past Medical History:   Diagnosis Date   • Anxiety    • Arrhythmia 11/2019    Flutter feeling, vibration, palpitation, PVC's occ, had [place] Patch in Dec 2019   • Arthritis 01/21/2020    Osteo in knees   • ASTHMA    • Chronic pain of both knees 6/20/2018    Bilateral cortisone shots to both knees in Jan 2019   • Daytime sleepiness     due to knee pain , busy mind , Naps  once a day OCC   • Dyslipidemia    • Dysuria 5/27/2020   • Edema extremities 5/30/2018     IMO 10/1 Regulatory Update   • Gasping for breath    • GERD (gastroesophageal reflux disease)    • Heart burn 01/21/2020   • Heart palpitations 8/14/2019     IMO load March 2020   • Hemorrhage    • Hemorrhoids 11/12/15    colonoscopy    • Hypertension    • Infectious disease 01/21/2020    Sick grandchild and  with flu and cold   • Insomnia     trouble going to sleep and staying asleep   • Low serum vitamin D 9/19/2017   • Multiple thyroid nodules 01/21/2020    enlarged thyroid with nodules, Followed by Dr. Collins   • Non-seasonal allergic rhinitis    • Non-smoker 9/28/2020   • Obesity -- 45 7/27/2011   • Pain 01/21/2020    knees   • RAD (reactive airway disease) 7/1/2011    Only issues with colds, environmental   • Simple phobia     flying   • Sinus infection 01/21/2020    currently being treated with antibiotics and steroids by Dr. Quinones   • Sleep apnea 01/21/2020    Sleep study on 5/1/20   • Snoring    • Urinary incontinence 01/21/2020    Uses Maxi pads   • Varicose vein of leg 7/10/2015       Social History     Tobacco Use   • Smoking status: Never Smoker   • Smokeless tobacco: Never Used   Vaping Use   • Vaping Use: Never used   Substance Use Topics   • Alcohol use: Yes     Comment: twice a month   • Drug use: No       Current Outpatient Medications Ordered in Epic   Medication Sig Dispense Refill   • methimazole (TAPAZOLE) 5 MG Tab Take 5 mg by mouth 3 times a day. Patient is taking 2.5mg daily     • meloxicam (MOBIC) 7.5 MG Tab Take 1 to 2 tabs every day for pain. 60 tablet 3   • amLODIPine (NORVASC) 5 MG Tab Take 1 tablet by mouth every day. 90 tablet 3   • VENTOLIN  (90 Base) MCG/ACT Aero Soln inhalation aerosol INHALE 2 PUFFS BY MOUTH EVERY 6 HOURS AS NEEDED FOR SHORTNESS OF BREATH 18 g 2   • hydroCHLOROthiazide (HYDRODIURIL) 25 MG Tab Take 1 tablet by mouth every day. LAST REFILL: Needs to keep  "upcoming appointment with a new PCP for future refills 90 tablet 0   • meloxicam (MOBIC) 15 MG tablet      • sertraline (ZOLOFT) 50 MG Tab TAKE 1 TABLET BY MOUTH EVERY DAY 90 Tab 3   • acetaminophen (TYLENOL) 325 MG Tab Take 650 mg by mouth every four hours as needed.     • fluticasone (FLONASE) 50 MCG/ACT nasal spray SPRAY 2 SPRAYS IN EACH NOSTRIL EVERY DAY 1 Bottle 6   • cyanocobalamin (VITAMIN B-12) 100 MCG TABS Take 100 mcg by mouth every day.     • vitamin D (CHOLECALCIFEROL) 1000 UNIT TABS Take 1,000 Units by mouth every day.     • Cetirizine HCl (ZYRTEC PO) Take  by mouth every day.     • Multiple Vitamins-Minerals (WOMENS MULTI VITAMIN & MINERAL PO) Take  by mouth every day.     • methimazole (TAPAZOLE) 5 MG Tab 1 tablet for 7 d then 1/2 qd     • Diclofenac Sodium (VOLTAREN) 1 % Gel Apply  to skin as directed as needed. Knee pain (Patient not taking: Reported on 5/14/2021)     • azelastine (ASTELIN) 137 MCG/SPRAY nasal spray 1 Spray every day. Rotates with Flonase (Patient not taking: Reported on 5/14/2021)  12   • aspirin EC (ECOTRIN) 81 MG Tablet Delayed Response Take 81 mg by mouth every day. (Patient not taking: Reported on 5/14/2021)       No current [facility]-ordered facility-administered medications on file.       Allergies:  Augmentin, Cephalexin, Pcn [penicillins], Sulfa drugs, and Latex      Objective:     Exam:  /80   Pulse 77   Temp 36.8 °C (98.2 °F)   Resp 14   Ht 1.626 m (5' 4\")   Wt 108 kg (238 lb)   LMP 07/26/2012   SpO2 97%   BMI 40.85 kg/m²  Body mass index is 40.85 kg/m².    Gen: Alert and oriented, No apparent distress.    Assessment & Plan:     65 y.o. female with the following -     Subclinical hyperthyroidism  This is a chronic condition.  Stable.  She is followed by endocrinology, Dr. Collins.  Labs from 4/19/2021 showed a reduced TSH of 0.14, normal free T4 of 1.4, and normal T3 of 107.  Thyroid uptake scan on 4/5/2021 was normal.  She is currently on methimazole 2.5 mg " daily, which she is tolerating well.  -Continue methimazole 2.5 mg daily     Microscopic hematuria  This is a chronic condition.  Stable.  CT abdomen and pelvis 1/25/2021 showed a 12 mm nonenhancing cyst in the left kidney and 2 mm nonobstructing left kidney stone.  Cystoscopy was normal.  Urology feels the recurrent hematuria is secondary to movements of the kidney stone.  -Continue to monitor     Rash  This is a acute condition.  Resolving.  -Continue OTC hydrocortisone to the affected area    Primary osteoarthritis of both knees  This is a chronic condition.  Patient is awaiting surgery.  -Meloxicam 7.5 to 15 mg daily for pain  -We will monitor renal function every 3 months  - meloxicam (MOBIC) 7.5 MG Tab; Take 1 to 2 tabs every day for pain.  Dispense: 60 tablet; Refill: 3  - Comp Metabolic Panel; Future    Return in about 6 months (around 11/14/2021) for f/u labs.    Please note that this dictation was created using voice recognition software. I have made every reasonable attempt to correct obvious errors, but I expect that there are errors of grammar and possibly content that I did not discover before finalizing the note.

## 2021-05-14 ENCOUNTER — OFFICE VISIT (OUTPATIENT)
Dept: MEDICAL GROUP | Facility: PHYSICIAN GROUP | Age: 66
End: 2021-05-14
Payer: COMMERCIAL

## 2021-05-14 VITALS
WEIGHT: 238 LBS | TEMPERATURE: 98.2 F | HEART RATE: 77 BPM | DIASTOLIC BLOOD PRESSURE: 80 MMHG | SYSTOLIC BLOOD PRESSURE: 124 MMHG | BODY MASS INDEX: 40.63 KG/M2 | RESPIRATION RATE: 14 BRPM | OXYGEN SATURATION: 97 % | HEIGHT: 64 IN

## 2021-05-14 DIAGNOSIS — E05.90 SUBCLINICAL HYPERTHYROIDISM: Chronic | ICD-10-CM

## 2021-05-14 DIAGNOSIS — R21 RASH: ICD-10-CM

## 2021-05-14 DIAGNOSIS — M17.0 PRIMARY OSTEOARTHRITIS OF BOTH KNEES: ICD-10-CM

## 2021-05-14 DIAGNOSIS — R31.29 MICROSCOPIC HEMATURIA: ICD-10-CM

## 2021-05-14 PROBLEM — R79.89 LOW SERUM VITAMIN D: Status: RESOLVED | Noted: 2017-09-19 | Resolved: 2021-05-14

## 2021-05-14 PROBLEM — E78.5 HYPERLIPIDEMIA: Status: ACTIVE | Noted: 2021-05-14

## 2021-05-14 PROBLEM — R00.2 PALPITATIONS: Status: RESOLVED | Noted: 2019-08-14 | Resolved: 2021-05-14

## 2021-05-14 PROBLEM — M25.561 CHRONIC PAIN OF BOTH KNEES: Status: RESOLVED | Noted: 2018-06-20 | Resolved: 2021-05-14

## 2021-05-14 PROBLEM — R60.0 EDEMA OF EXTREMITIES: Status: RESOLVED | Noted: 2018-05-30 | Resolved: 2021-05-14

## 2021-05-14 PROBLEM — M25.562 CHRONIC PAIN OF BOTH KNEES: Status: RESOLVED | Noted: 2018-06-20 | Resolved: 2021-05-14

## 2021-05-14 PROBLEM — Z78.9 NON-SMOKER: Status: RESOLVED | Noted: 2020-09-28 | Resolved: 2021-05-14

## 2021-05-14 PROBLEM — E78.5 HYPERLIPIDEMIA: Chronic | Status: ACTIVE | Noted: 2021-05-14

## 2021-05-14 PROBLEM — G89.29 CHRONIC PAIN OF BOTH KNEES: Status: RESOLVED | Noted: 2018-06-20 | Resolved: 2021-05-14

## 2021-05-14 PROBLEM — R30.0 DYSURIA: Status: RESOLVED | Noted: 2020-05-27 | Resolved: 2021-05-14

## 2021-05-14 PROCEDURE — 99214 OFFICE O/P EST MOD 30 MIN: CPT | Performed by: INTERNAL MEDICINE

## 2021-05-14 RX ORDER — METHIMAZOLE 5 MG/1
5 TABLET ORAL 3 TIMES DAILY
COMMUNITY
End: 2021-12-15

## 2021-05-14 RX ORDER — MELOXICAM 7.5 MG/1
TABLET ORAL
Qty: 60 TABLET | Refills: 3 | Status: SHIPPED | OUTPATIENT
Start: 2021-05-14 | End: 2021-05-19

## 2021-05-14 RX ORDER — METHIMAZOLE 5 MG/1
TABLET ORAL
COMMUNITY

## 2021-05-14 ASSESSMENT — FIBROSIS 4 INDEX: FIB4 SCORE: 1.04

## 2021-05-19 ENCOUNTER — TELEPHONE (OUTPATIENT)
Dept: MEDICAL GROUP | Facility: PHYSICIAN GROUP | Age: 66
End: 2021-05-19

## 2021-05-19 ENCOUNTER — PATIENT MESSAGE (OUTPATIENT)
Dept: MEDICAL GROUP | Facility: PHYSICIAN GROUP | Age: 66
End: 2021-05-19

## 2021-05-19 DIAGNOSIS — M17.0 PRIMARY OSTEOARTHRITIS OF BOTH KNEES: ICD-10-CM

## 2021-05-19 RX ORDER — MELOXICAM 7.5 MG/1
7.5 TABLET ORAL DAILY
Qty: 30 TABLET | Refills: 3 | Status: SHIPPED | OUTPATIENT
Start: 2021-05-19 | End: 2021-12-15

## 2021-05-19 NOTE — TELEPHONE ENCOUNTER
FINAL PRIOR AUTHORIZATION STATUS:    1.  Name of Medication & Dose: meloxicam 7.5 mg tab     2. Prior Auth Status: Denied.  Reason: not covered by pt's plan    3. Action Taken: Pharmacy Notified: no Patient Notified: yes

## 2021-06-04 ENCOUNTER — HOSPITAL ENCOUNTER (OUTPATIENT)
Dept: LAB | Facility: MEDICAL CENTER | Age: 66
End: 2021-06-04
Attending: INTERNAL MEDICINE
Payer: COMMERCIAL

## 2021-06-04 LAB
T4 FREE SERPL-MCNC: 1.2 NG/DL (ref 0.93–1.7)
TSH SERPL DL<=0.005 MIU/L-ACNC: 0.88 UIU/ML (ref 0.38–5.33)

## 2021-06-04 PROCEDURE — 84439 ASSAY OF FREE THYROXINE: CPT

## 2021-06-04 PROCEDURE — 84443 ASSAY THYROID STIM HORMONE: CPT

## 2021-06-04 PROCEDURE — 36415 COLL VENOUS BLD VENIPUNCTURE: CPT

## 2021-06-15 ENCOUNTER — APPOINTMENT (RX ONLY)
Dept: URBAN - METROPOLITAN AREA CLINIC 4 | Facility: CLINIC | Age: 66
Setting detail: DERMATOLOGY
End: 2021-06-15

## 2021-06-15 DIAGNOSIS — L82.1 OTHER SEBORRHEIC KERATOSIS: ICD-10-CM

## 2021-06-15 DIAGNOSIS — Z71.89 OTHER SPECIFIED COUNSELING: ICD-10-CM

## 2021-06-15 DIAGNOSIS — L81.4 OTHER MELANIN HYPERPIGMENTATION: ICD-10-CM

## 2021-06-15 DIAGNOSIS — D18.0 HEMANGIOMA: ICD-10-CM

## 2021-06-15 DIAGNOSIS — D22 MELANOCYTIC NEVI: ICD-10-CM

## 2021-06-15 PROBLEM — D22.9 MELANOCYTIC NEVI, UNSPECIFIED: Status: ACTIVE | Noted: 2021-06-15

## 2021-06-15 PROBLEM — D18.01 HEMANGIOMA OF SKIN AND SUBCUTANEOUS TISSUE: Status: ACTIVE | Noted: 2021-06-15

## 2021-06-15 PROBLEM — D48.5 NEOPLASM OF UNCERTAIN BEHAVIOR OF SKIN: Status: ACTIVE | Noted: 2021-06-15

## 2021-06-15 PROCEDURE — 99213 OFFICE O/P EST LOW 20 MIN: CPT | Mod: 25

## 2021-06-15 PROCEDURE — 11102 TANGNTL BX SKIN SINGLE LES: CPT

## 2021-06-15 PROCEDURE — ? BIOPSY BY SHAVE METHOD

## 2021-06-15 PROCEDURE — ? SUNSCREEN RECOMMENDATIONS

## 2021-06-15 PROCEDURE — ? COUNSELING

## 2021-06-15 ASSESSMENT — LOCATION SIMPLE DESCRIPTION DERM: LOCATION SIMPLE: RIGHT ANTERIOR NECK

## 2021-06-15 ASSESSMENT — LOCATION ZONE DERM: LOCATION ZONE: NECK

## 2021-06-15 ASSESSMENT — LOCATION DETAILED DESCRIPTION DERM: LOCATION DETAILED: RIGHT INFERIOR LATERAL NECK

## 2021-06-15 NOTE — PROCEDURE: MIPS QUALITY
Quality 431: Preventive Care And Screening: Unhealthy Alcohol Use - Screening: Patient screened for unhealthy alcohol use using a single question and scores less than 2 times per year
Quality 226: Preventive Care And Screening: Tobacco Use: Screening And Cessation Intervention: Patient screened for tobacco use and is an ex/non-smoker
Quality 110: Preventive Care And Screening: Influenza Immunization: Influenza immunization was not ordered or administered, reason not given
Detail Level: Detailed
Quality 111:Pneumonia Vaccination Status For Older Adults: Pneumococcal Vaccination not Administered or Previously Received, Reason not Otherwise Specified
Quality 130: Documentation Of Current Medications In The Medical Record: Current Medications Documented

## 2021-07-07 ENCOUNTER — APPOINTMENT (OUTPATIENT)
Dept: SLEEP MEDICINE | Facility: MEDICAL CENTER | Age: 66
End: 2021-07-07
Attending: FAMILY MEDICINE
Payer: COMMERCIAL

## 2021-07-07 DIAGNOSIS — G47.33 OSA (OBSTRUCTIVE SLEEP APNEA): ICD-10-CM

## 2021-07-08 ENCOUNTER — TELEPHONE (OUTPATIENT)
Dept: SLEEP MEDICINE | Facility: MEDICAL CENTER | Age: 66
End: 2021-07-08

## 2021-07-08 NOTE — TELEPHONE ENCOUNTER
PT returned her home sleep study and said the device kept falling off and she does not think it recorded anything. Please give her a call and let her know if it did or not. Thank you!!

## 2021-08-07 LAB
ALBUMIN SERPL-MCNC: 4.4 G/DL (ref 3.8–4.8)
ALBUMIN/GLOB SERPL: 1.9 {RATIO} (ref 1.2–2.2)
ALP SERPL-CCNC: 72 IU/L (ref 48–121)
ALT SERPL-CCNC: 15 IU/L (ref 0–32)
AST SERPL-CCNC: 17 IU/L (ref 0–40)
BILIRUB SERPL-MCNC: 1.3 MG/DL (ref 0–1.2)
BUN SERPL-MCNC: 16 MG/DL (ref 8–27)
BUN/CREAT SERPL: 18 (ref 12–28)
CALCIUM SERPL-MCNC: 9.3 MG/DL (ref 8.7–10.3)
CHLORIDE SERPL-SCNC: 104 MMOL/L (ref 96–106)
CO2 SERPL-SCNC: 27 MMOL/L (ref 20–29)
CREAT SERPL-MCNC: 0.9 MG/DL (ref 0.57–1)
GLOBULIN SER CALC-MCNC: 2.3 G/DL (ref 1.5–4.5)
GLUCOSE SERPL-MCNC: 95 MG/DL (ref 65–99)
POTASSIUM SERPL-SCNC: 3.4 MMOL/L (ref 3.5–5.2)
PROT SERPL-MCNC: 6.7 G/DL (ref 6–8.5)
SODIUM SERPL-SCNC: 145 MMOL/L (ref 134–144)

## 2021-08-11 ENCOUNTER — TELEPHONE (OUTPATIENT)
Dept: SLEEP MEDICINE | Facility: MEDICAL CENTER | Age: 66
End: 2021-08-11

## 2021-08-11 DIAGNOSIS — G47.33 OSA (OBSTRUCTIVE SLEEP APNEA): ICD-10-CM

## 2021-08-11 DIAGNOSIS — R31.9 HEMATURIA, UNSPECIFIED TYPE: ICD-10-CM

## 2021-08-11 NOTE — TELEPHONE ENCOUNTER
Edna from scheduling called in and stated that the referral for this patient's home sleep study will  soon, and the patient needs to r/s. Edna needs a new referral in order to schedule. Patient will call back in a couple days to r/s.

## 2021-08-16 NOTE — TELEPHONE ENCOUNTER
Scheduling trying to schedule patient with a Home Sleep Study BUT the order Dr Hodges placed in 21 has , new order or extend expiration on existing order please. New order please needed for schedulers.     Patient last saw Dr Lechuga 20.

## 2021-08-17 NOTE — TELEPHONE ENCOUNTER
Voice message left for patient to call [facility] to schedule her sleep study. We have placed a new updated order.

## 2021-08-23 PROBLEM — M17.12 ARTHRITIS OF LEFT KNEE: Status: ACTIVE | Noted: 2021-08-23

## 2021-08-23 PROBLEM — M25.562 LEFT KNEE PAIN: Status: ACTIVE | Noted: 2021-08-23

## 2021-11-18 ENCOUNTER — HOME STUDY (OUTPATIENT)
Dept: SLEEP MEDICINE | Facility: MEDICAL CENTER | Age: 66
End: 2021-11-18
Attending: FAMILY MEDICINE
Payer: COMMERCIAL

## 2021-11-18 DIAGNOSIS — G47.33 OSA (OBSTRUCTIVE SLEEP APNEA): ICD-10-CM

## 2021-11-18 PROCEDURE — 95806 SLEEP STUDY UNATT&RESP EFFT: CPT | Performed by: FAMILY MEDICINE

## 2021-11-23 NOTE — PROCEDURES
DIAGNOSTIC HOME SLEEP TEST (HST) REPORT       PATIENT ID:  NAME:  Kiley Foss  MRN:               [id number]  YOB: 1955  DATE OF STUDY: 11/18/2021      Impression:     This study shows evidence of:    1.Severe obstructive sleep apnea with  Respiratory Event Index (NANCY) of 59.8 per hour. These findings are based on the recording time (flow evaluation time).     2.Nocturnal hypoxia:  O2 Sat. gabriela was 70%, avg O2 was 87 % and baseline O2 at 95 %. O2 sat was below 89% for 406 min of the flow evaluation time. Avg HR 69 BPM.      TECHNICAL DESCRIPTION:  ResMed Device used was a type-III home study device. Home sleep study recording included: Airflow recording by nasal pressure transducer; Respiratory Effort by abdominal Respiratory Bands; O2 by finger oximetry. A position sensor and a snore channel was also used.    Scoring Criteria: A modification of the the AASM Manual for the Scoring of Sleep and Associated Events. Obstructive apnea was scored by cessation of airflow for at least 10 seconds with continuing respiratory effort.  Central apnea was scored by cessation of airflow for at least 10 seconds with no effort.  Hypopnea was scored by a 30% or more reduction in airflow for at least 10 seconds accompanied by an arterial oxygen desaturation of 3% or more.  (For Medicare patients, hypopneas were scored by a 30% or more reduction in airflow for at least 10 seconds accompanied by an arterial oxygen saturation of 4% or more, as required by their insurance, CMS.        General sleep summary: . Total recording time is 521 minutes and total flow evaluation time is 515 minutes. The patient spent 67 minutes in the supine position.      Respiratory events:    Apneas: 207 (Obstructive apnea index 22.3/hr, Central apnea index 1.7 /hr, mixed 0 /hour)  Hypopneas: 307    Recommendations:    1. CPAP titration study vs Auto CPAP trial .   2.   In general patients with sleep apnea are advised to avoid  alcohol and sedatives and to not operate a motor vehicle while drowsy. In some cases alternative treatment options may prove effective in resolving sleep apnea in these options include upper airway surgery, the use of a dental orthotic or weight loss and positional therapy. Clinical correlation is required.         Jj Hodges MD

## 2021-12-15 ENCOUNTER — OFFICE VISIT (OUTPATIENT)
Dept: MEDICAL GROUP | Facility: PHYSICIAN GROUP | Age: 66
End: 2021-12-15
Payer: COMMERCIAL

## 2021-12-15 VITALS
DIASTOLIC BLOOD PRESSURE: 72 MMHG | TEMPERATURE: 97.9 F | BODY MASS INDEX: 41.32 KG/M2 | RESPIRATION RATE: 16 BRPM | OXYGEN SATURATION: 96 % | WEIGHT: 242 LBS | HEART RATE: 86 BPM | HEIGHT: 64 IN | SYSTOLIC BLOOD PRESSURE: 130 MMHG

## 2021-12-15 DIAGNOSIS — E80.4 GILBERT'S DISEASE: Chronic | ICD-10-CM

## 2021-12-15 DIAGNOSIS — Z12.31 ENCOUNTER FOR SCREENING MAMMOGRAM FOR MALIGNANT NEOPLASM OF BREAST: ICD-10-CM

## 2021-12-15 DIAGNOSIS — Z13.0 SCREENING FOR DEFICIENCY ANEMIA: ICD-10-CM

## 2021-12-15 DIAGNOSIS — I10 ESSENTIAL HYPERTENSION: Chronic | ICD-10-CM

## 2021-12-15 DIAGNOSIS — E78.5 DYSLIPIDEMIA: ICD-10-CM

## 2021-12-15 DIAGNOSIS — E05.90 SUBCLINICAL HYPERTHYROIDISM: Chronic | ICD-10-CM

## 2021-12-15 DIAGNOSIS — J45.20 MILD INTERMITTENT ASTHMA WITHOUT COMPLICATION: ICD-10-CM

## 2021-12-15 DIAGNOSIS — J30.89 ALLERGIC RHINITIS DUE TO OTHER ALLERGIC TRIGGER, UNSPECIFIED SEASONALITY: ICD-10-CM

## 2021-12-15 DIAGNOSIS — G47.33 OSA (OBSTRUCTIVE SLEEP APNEA): ICD-10-CM

## 2021-12-15 DIAGNOSIS — F41.1 GAD (GENERALIZED ANXIETY DISORDER): Chronic | ICD-10-CM

## 2021-12-15 DIAGNOSIS — R31.29 MICROSCOPIC HEMATURIA: Chronic | ICD-10-CM

## 2021-12-15 PROBLEM — R21 RASH: Status: RESOLVED | Noted: 2021-05-14 | Resolved: 2021-12-15

## 2021-12-15 PROCEDURE — 99214 OFFICE O/P EST MOD 30 MIN: CPT | Performed by: INTERNAL MEDICINE

## 2021-12-15 ASSESSMENT — FIBROSIS 4 INDEX: FIB4 SCORE: 1.2

## 2021-12-15 NOTE — PROGRESS NOTES
Subjective:     CC:   Chief Complaint   Patient presents with   • Follow-Up     6 mo check up         HPI:   Kiley presents today to discuss the following issues:    The patient is here to follow-up on multiple medical conditions.  She is scheduled to have knee replacement surgery, but has not yet been scheduled for a date.  She did not get repeat labs for this visit.  She has been busy caring for her father who has dementia.  She was recently diagnosed with EVE, but has not yet had her follow-up visit with pulmonary.      Past Medical History:   Diagnosis Date   • Anxiety    • Arrhythmia 11/2019    Flutter feeling, vibration, palpitation, PVC's occ, had William Patch in Dec 2019   • Arthritis 01/21/2020    Osteo in knees   • ASTHMA    • Chronic pain of both knees 6/20/2018    Bilateral cortisone shots to both knees in Jan 2019   • Daytime sleepiness     due to knee pain , busy mind , Naps once a day OCC   • Dyslipidemia    • Dysuria 5/27/2020   • Edema extremities 5/30/2018     IMO 10/1 Regulatory Update   • Gasping for breath    • GERD (gastroesophageal reflux disease)    • Heart burn 01/21/2020   • Heart palpitations 8/14/2019     IMO load March 2020   • Hemorrhage    • Hemorrhoids 11/12/15    colonoscopy    • Hypertension    • Infectious disease 01/21/2020    Sick grandchild and  with flu and cold   • Insomnia     trouble going to sleep and staying asleep   • Low serum vitamin D 9/19/2017   • Multiple thyroid nodules 01/21/2020    enlarged thyroid with nodules, Followed by Dr. Collins   • Non-seasonal allergic rhinitis    • Non-smoker 9/28/2020   • Obesity -- 45 7/27/2011   • Pain 01/21/2020    knees   • RAD (reactive airway disease) 7/1/2011    Only issues with colds, environmental   • Rash 5/14/2021   • Simple phobia     flying   • Sinus infection 01/21/2020    currently being treated with antibiotics and steroids by Dr. Quinones   • Sleep apnea 01/21/2020    Sleep study on 5/1/20   • Snoring    • Urinary  "incontinence 01/21/2020    Uses Maxi pads   • Varicose vein of leg 7/10/2015       Social History     Tobacco Use   • Smoking status: Never Smoker   • Smokeless tobacco: Never Used   Vaping Use   • Vaping Use: Never used   Substance Use Topics   • Alcohol use: Yes     Comment: twice a month   • Drug use: No       Current Outpatient Medications Ordered in Epic   Medication Sig Dispense Refill   • sertraline (ZOLOFT) 50 MG Tab Take 1 Tablet by mouth every day. 90 Tablet 3   • hydroCHLOROthiazide (HYDRODIURIL) 25 MG Tab TAKE 1 TABLET BY MOUTH EVERY DAY 90 tablet 2   • methimazole (TAPAZOLE) 5 MG Tab 1 tablet for 7 d then 1/2 qd     • amLODIPine (NORVASC) 5 MG Tab Take 1 tablet by mouth every day. 90 tablet 3   • VENTOLIN  (90 Base) MCG/ACT Aero Soln inhalation aerosol INHALE 2 PUFFS BY MOUTH EVERY 6 HOURS AS NEEDED FOR SHORTNESS OF BREATH 18 g 2   • fluticasone (FLONASE) 50 MCG/ACT nasal spray SPRAY 2 SPRAYS IN EACH NOSTRIL EVERY DAY 1 Bottle 6   • Cetirizine HCl (ZYRTEC PO) Take  by mouth every day.     • Multiple Vitamins-Minerals (WOMENS MULTI VITAMIN & MINERAL PO) Take  by mouth every day.     • vitamin D (CHOLECALCIFEROL) 1000 UNIT TABS Take 1,000 Units by mouth every day.       No current [facility]-ordered facility-administered medications on file.       Allergies:  Augmentin, Cephalexin, Pcn [penicillins], Sulfa drugs, and Latex    Health Maintenance: Completed    ROS:   Denies any recent fevers or chills. No nausea or vomiting. No chest pains or shortness of breath.    Objective:       Exam:  /72   Pulse 86   Temp 36.6 °C (97.9 °F)   Resp 16   Ht 1.626 m (5' 4\")   Wt 110 kg (242 lb)   LMP 07/26/2012   SpO2 96%   BMI 41.54 kg/m²  Body mass index is 41.54 kg/m².    Gen: Alert and oriented, No apparent distress.  Lungs: Normal effort, CTA bilaterally, no wheezes, rhonchi, or rales  CV: Regular rate and rhythm. No murmurs, rubs, or gallops.  Ext: No clubbing, cyanosis, edema.        Assessment & " Plan:     66 y.o. female with the following -     Subclinical hyperthyroidism  This is a chronic medical condition.  Stable.  She is followed by endocrinology, Dr. Collins.  Labs from 6/4/2021 showed a normal TSH 0.88 and free T4 1.20.  Thyroid uptake scan on 4/5/2021 was normal.  She is currently on methimazole 2.5 mg daily, which she is tolerating well.  -Continue methimazole 2.5 mg daily     Essential hypertension  This is a chronic medical condition.   Well-controlled. Patient is on amlodipine 5 mg daily and HCTZ 25 mg daily. The patient denies new headaches, vision changes, chest pain, shortness of breath, lower extremity edema. Blood pressure is normal today's visit.  -Continue HCTZ 25 mg daily  -Continue amlodipine 5 mg daily    Dyslipidemia   This is a chronic medical condition. Lipid panel from 5/11/2021 showed a total cholesterol of 189, , HDL 53, triglycerides 98. The 10-year ASCVD risk score ([place] DC Jr., et al., 2013) is: 6.8%  -Continue dietary management with a low cholesterol diet given the patient's low 10-year ASCVD risk score  - Lipid Profile; Future  - Comp Metabolic Panel; Future    EVE (obstructive sleep apnea)  This is a chronic medical condition.  The patient had an overnight sleep study on 11/18/2021 which showed severe obstructive sleep apnea with an NANCY of 59.8/h.  O2 saturation gabriela was 70%, average O2 was 87%, and baseline O2 was at 95%.  It was recommended that the patient get a CPAP titration study versus auto CPAP trial.  The patient has follow-up with pulmonary.    REINALDO (generalized anxiety disorder)  This is a chronic medical condition.  Stable.  The patient is on sertraline 50 mg daily and alprazolam 0.25 mg as needed for anxiety.  -Continue sertraline 50 mg daily  -Continue alprazolam 0.25 mg as needed for anxiety    Allergic rhinitis  Mild intermittent asthma without complication  This is a chronic condition.  Well-controlled.  -Continue cetirizine 10 mg daily  -Continue  fluticasone nasal spray  -Continue albuterol as needed     Gilbert's disease  This is a chronic medical condition.  Stable.  The patient has chronically recurrent elevated bilirubin levels, likely secondary to Gilbert's disease.      Microscopic hematuria  This is a chronic medical condition.  Stable.  CT abdomen and pelvis 1/25/2021 showed a 12 mm nonenhancing cyst in the left kidney and 2 mm nonobstructing left kidney stone.  Cystoscopy was normal.  Urology feels the recurrent hematuria is secondary to movements of the kidney stone.  -Continue to monitor    Encounter for screening mammogram for malignant neoplasm of breast  - MA-SCREENING MAMMO BILAT W/TOMOSYNTHESIS W/CAD; Future    Screening for deficiency anemia  - CBC WITH DIFFERENTIAL; Future    Return in about 3 months (around 3/15/2022) for Preop medical clearance.    Please note that this dictation was created using voice recognition software. I have made every reasonable attempt to correct obvious errors, but I expect that there are errors of grammar and possibly content that I did not discover before finalizing the note.

## 2021-12-22 ENCOUNTER — PATIENT MESSAGE (OUTPATIENT)
Dept: SLEEP MEDICINE | Facility: MEDICAL CENTER | Age: 66
End: 2021-12-22

## 2021-12-22 ENCOUNTER — TELEPHONE (OUTPATIENT)
Dept: SLEEP MEDICINE | Facility: MEDICAL CENTER | Age: 66
End: 2021-12-22

## 2021-12-22 DIAGNOSIS — G47.33 OSA (OBSTRUCTIVE SLEEP APNEA): ICD-10-CM

## 2021-12-22 NOTE — TELEPHONE ENCOUNTER
Patient received the results about her sleep study via [facility] and would like to know what she needs to do next about getting equipment. Does she need to make an appointment?    Would like either a call or [facility] message.    #[phone]

## 2021-12-23 NOTE — PATIENT COMMUNICATION
Order sent to DME:  [facility] / [facility] [phone] / [facility] [phone]    Pt informed via [facility].    Hopefully this goes through, I'm concerned because the OV was over a year old by the time we did the HST.

## 2022-02-08 ENCOUNTER — HOSPITAL ENCOUNTER (OUTPATIENT)
Dept: RADIOLOGY | Facility: MEDICAL CENTER | Age: 67
End: 2022-02-08
Attending: INTERNAL MEDICINE
Payer: COMMERCIAL

## 2022-02-08 DIAGNOSIS — Z12.31 ENCOUNTER FOR SCREENING MAMMOGRAM FOR MALIGNANT NEOPLASM OF BREAST: ICD-10-CM

## 2022-02-08 PROCEDURE — 77063 BREAST TOMOSYNTHESIS BI: CPT

## 2022-02-14 DIAGNOSIS — I10 ESSENTIAL HYPERTENSION: ICD-10-CM

## 2022-02-15 RX ORDER — HYDROCHLOROTHIAZIDE 25 MG/1
TABLET ORAL
Qty: 90 TABLET | Refills: 2 | Status: SHIPPED | OUTPATIENT
Start: 2022-02-15 | End: 2022-11-08 | Stop reason: SDUPTHER

## 2022-03-07 ENCOUNTER — APPOINTMENT (RX ONLY)
Dept: URBAN - METROPOLITAN AREA CLINIC 22 | Facility: CLINIC | Age: 67
Setting detail: DERMATOLOGY
End: 2022-03-07

## 2022-03-07 DIAGNOSIS — L81.4 OTHER MELANIN HYPERPIGMENTATION: ICD-10-CM

## 2022-03-07 DIAGNOSIS — D22 MELANOCYTIC NEVI: ICD-10-CM

## 2022-03-07 DIAGNOSIS — L82.1 OTHER SEBORRHEIC KERATOSIS: ICD-10-CM

## 2022-03-07 DIAGNOSIS — Z87.2 PERSONAL HISTORY OF DISEASES OF THE SKIN AND SUBCUTANEOUS TISSUE: ICD-10-CM

## 2022-03-07 DIAGNOSIS — D18.0 HEMANGIOMA: ICD-10-CM

## 2022-03-07 DIAGNOSIS — Z71.89 OTHER SPECIFIED COUNSELING: ICD-10-CM

## 2022-03-07 PROBLEM — D22.5 MELANOCYTIC NEVI OF TRUNK: Status: ACTIVE | Noted: 2022-03-07

## 2022-03-07 PROBLEM — D18.01 HEMANGIOMA OF SKIN AND SUBCUTANEOUS TISSUE: Status: ACTIVE | Noted: 2022-03-07

## 2022-03-07 PROCEDURE — 99213 OFFICE O/P EST LOW 20 MIN: CPT

## 2022-03-07 PROCEDURE — ? SUNSCREEN RECOMMENDATIONS

## 2022-03-07 PROCEDURE — ? COUNSELING

## 2022-03-07 ASSESSMENT — LOCATION ZONE DERM
LOCATION ZONE: TRUNK
LOCATION ZONE: ARM

## 2022-03-07 ASSESSMENT — LOCATION SIMPLE DESCRIPTION DERM
LOCATION SIMPLE: LEFT POSTERIOR UPPER ARM
LOCATION SIMPLE: LEFT UPPER BACK
LOCATION SIMPLE: ABDOMEN
LOCATION SIMPLE: LEFT FOREARM
LOCATION SIMPLE: RIGHT UPPER BACK

## 2022-03-07 ASSESSMENT — LOCATION DETAILED DESCRIPTION DERM
LOCATION DETAILED: LEFT PROXIMAL POSTERIOR UPPER ARM
LOCATION DETAILED: LEFT PROXIMAL DORSAL FOREARM
LOCATION DETAILED: LEFT LATERAL ABDOMEN
LOCATION DETAILED: RIGHT MID-UPPER BACK
LOCATION DETAILED: LEFT INFERIOR UPPER BACK

## 2022-03-11 ENCOUNTER — PATIENT MESSAGE (OUTPATIENT)
Dept: MEDICAL GROUP | Facility: PHYSICIAN GROUP | Age: 67
End: 2022-03-11
Payer: COMMERCIAL

## 2022-03-11 RX ORDER — MELOXICAM 7.5 MG/1
7.5 TABLET ORAL DAILY
Qty: 30 TABLET | Refills: 3 | Status: SHIPPED | OUTPATIENT
Start: 2022-03-11 | End: 2022-08-16

## 2022-04-14 DIAGNOSIS — I10 ESSENTIAL HYPERTENSION: ICD-10-CM

## 2022-04-14 RX ORDER — AMLODIPINE BESYLATE 5 MG/1
5 TABLET ORAL
Qty: 90 TABLET | Refills: 3 | Status: SHIPPED | OUTPATIENT
Start: 2022-04-14 | End: 2023-02-07 | Stop reason: SDUPTHER

## 2022-06-20 ENCOUNTER — OFFICE VISIT (OUTPATIENT)
Dept: URGENT CARE | Facility: PHYSICIAN GROUP | Age: 67
End: 2022-06-20
Payer: COMMERCIAL

## 2022-06-20 ENCOUNTER — HOSPITAL ENCOUNTER (OUTPATIENT)
Facility: MEDICAL CENTER | Age: 67
End: 2022-06-20
Attending: FAMILY MEDICINE
Payer: COMMERCIAL

## 2022-06-20 VITALS
HEIGHT: 64 IN | RESPIRATION RATE: 18 BRPM | WEIGHT: 249 LBS | BODY MASS INDEX: 42.51 KG/M2 | HEART RATE: 84 BPM | DIASTOLIC BLOOD PRESSURE: 72 MMHG | OXYGEN SATURATION: 93 % | TEMPERATURE: 97.7 F | SYSTOLIC BLOOD PRESSURE: 128 MMHG

## 2022-06-20 DIAGNOSIS — Z20.822 EXPOSURE TO COVID-19 VIRUS: ICD-10-CM

## 2022-06-20 DIAGNOSIS — Z20.822 SUSPECTED COVID-19 VIRUS INFECTION: ICD-10-CM

## 2022-06-20 DIAGNOSIS — R05.9 COUGH: ICD-10-CM

## 2022-06-20 PROCEDURE — 99214 OFFICE O/P EST MOD 30 MIN: CPT | Mod: CS | Performed by: FAMILY MEDICINE

## 2022-06-20 PROCEDURE — U0003 INFECTIOUS AGENT DETECTION BY NUCLEIC ACID (DNA OR RNA); SEVERE ACUTE RESPIRATORY SYNDROME CORONAVIRUS 2 (SARS-COV-2) (CORONAVIRUS DISEASE [COVID-19]), AMPLIFIED PROBE TECHNIQUE, MAKING USE OF HIGH THROUGHPUT TECHNOLOGIES AS DESCRIBED BY CMS-2020-01-R: HCPCS

## 2022-06-20 PROCEDURE — U0005 INFEC AGEN DETEC AMPLI PROBE: HCPCS

## 2022-06-20 ASSESSMENT — FIBROSIS 4 INDEX: FIB4 SCORE: 1.2

## 2022-06-20 ASSESSMENT — ENCOUNTER SYMPTOMS
COUGH: 1
NAUSEA: 0
SHORTNESS OF BREATH: 0
FEVER: 0
SORE THROAT: 0
VOMITING: 0
DIZZINESS: 0
CHILLS: 0
MYALGIAS: 0

## 2022-06-21 DIAGNOSIS — Z20.822 SUSPECTED COVID-19 VIRUS INFECTION: ICD-10-CM

## 2022-06-21 DIAGNOSIS — Z20.822 EXPOSURE TO COVID-19 VIRUS: ICD-10-CM

## 2022-06-21 LAB
COVID ORDER STATUS COVID19: NORMAL
SARS-COV-2 RNA RESP QL NAA+PROBE: DETECTED
SPECIMEN SOURCE: ABNORMAL

## 2022-06-21 NOTE — PROGRESS NOTES
Subjective:   Kiley Foss is a 66 y.o. female who presents for Cough ( tested positive for covid Friday she has stuffy nose)        Cough  This is a new (Reports mild congestion, cough, reports close contact with recent COVID) problem. The current episode started yesterday. The problem has been unchanged. The cough is non-productive. Associated symptoms include nasal congestion. Pertinent negatives include no chills, fever, myalgias, rash, sore throat or shortness of breath. Associated symptoms comments: There has been community-wide COVID-19 exposure, the patient reports known direct COVID-19 exposure    Reports complete COVID-19 vaccination      Reports negative home COVID-19 antigen testing. She has tried rest for the symptoms. The treatment provided mild relief.     PMH:  has a past medical history of Anxiety, Arrhythmia (11/2019), Arthritis (01/21/2020), ASTHMA, Chronic pain of both knees (6/20/2018), Daytime sleepiness, Dyslipidemia, Dysuria (5/27/2020), Edema extremities (5/30/2018), Gasping for breath, GERD (gastroesophageal reflux disease), Heart burn (01/21/2020), Heart palpitations (8/14/2019), Hemorrhage, Hemorrhoids (11/12/15), Hypertension, Infectious disease (01/21/2020), Insomnia, Low serum vitamin D (9/19/2017), Multiple thyroid nodules (01/21/2020), Non-seasonal allergic rhinitis, Non-smoker (9/28/2020), Obesity -- 45 (7/27/2011), Pain (01/21/2020), RAD (reactive airway disease) (7/1/2011), Rash (5/14/2021), Simple phobia, Sinus infection (01/21/2020), Sleep apnea (01/21/2020), Snoring, Urinary incontinence (01/21/2020), and Varicose vein of leg (7/10/2015).    She has no past medical history of Apnea, sleep, Breast cancer (HCC), or Morning headache.  MEDS:   Current Outpatient Medications:   •  Nirmatrelvir & Ritonavir 20 x 150 MG & 10 x 100MG Tablet Therapy Pack, Take 300 mg nirmatrelvir (two 150 mg tablets) with 100 mg ritonavir (one 100 mg tablet) by mouth, with all three  "tablets taken together twice daily for 5 days., Disp: 30 Each, Rfl: 0  •  amLODIPine (NORVASC) 5 MG Tab, Take 1 Tablet by mouth every day., Disp: 90 Tablet, Rfl: 3  •  VENTOLIN  (90 Base) MCG/ACT Aero Soln inhalation aerosol, Inhale 2 Puffs every 6 hours as needed for Shortness of Breath., Disp: 18 g, Rfl: 2  •  meloxicam (MOBIC) 7.5 MG Tab, Take 1 Tablet by mouth every day., Disp: 30 Tablet, Rfl: 3  •  hydroCHLOROthiazide (HYDRODIURIL) 25 MG Tab, TAKE 1 TABLET BY MOUTH EVERY DAY, Disp: 90 Tablet, Rfl: 2  •  sertraline (ZOLOFT) 50 MG Tab, Take 1 Tablet by mouth every day., Disp: 90 Tablet, Rfl: 3  •  methimazole (TAPAZOLE) 5 MG Tab, 1 tablet for 7 d then 1/2 qd, Disp: , Rfl:   •  fluticasone (FLONASE) 50 MCG/ACT nasal spray, SPRAY 2 SPRAYS IN EACH NOSTRIL EVERY DAY, Disp: 1 Bottle, Rfl: 6  •  Cetirizine HCl (ZYRTEC PO), Take  by mouth every day., Disp: , Rfl:   •  vitamin D (CHOLECALCIFEROL) 1000 UNIT TABS, Take 1,000 Units by mouth every day., Disp: , Rfl:   •  Multiple Vitamins-Minerals (WOMENS MULTI VITAMIN & MINERAL PO), Take  by mouth every day. (Patient not taking: Reported on 6/20/2022), Disp: , Rfl:   ALLERGIES:   Allergies   Allergen Reactions   • Augmentin Anxiety and Unspecified     Feels like crawling on skin   • Cephalexin Vomiting     Generalized sense of \"not well\" went to Emergency Room   • Pcn [Penicillins] Anxiety   • Sulfa Drugs Anxiety     Sulfite reaction   • Diclofenac Sodium-Propylene Glycol Anxiety and Palpitations   • Ibuprofen Anxiety and Palpitations   • Keflex Unspecified   • Penicillin G Unspecified   • Latex Rash     Rash only     SURGHX:   Past Surgical History:   Procedure Laterality Date   • BIOPSY GENERAL Bilateral 01/08/2020    moles removed back of left arm and right shoulder   • COLONOSCOPY N/A 2016    Polyps and on 5 year plan for testing   • BIOPSY GENERAL      thyroid    • DENTAL EXTRACTION(S) N/A 1980's    3 extracted over a period of time   • SALPINGECTOMY      " "ectopic pregnancy   • SINUSOTOMIES     • TONSILLECTOMY     • TUBAL COAGULATION LAPAROSCOPIC BILATERAL       SOCHX:  reports that she has never smoked. She has never used smokeless tobacco. She reports current alcohol use. She reports that she does not use drugs.  FH:   Family History   Problem Relation Age of Onset   • Hypertension Mother    • Diabetes Mother    • Cancer Father         Non Hodgekin's Lymphoma   • Cancer Sister         cervical   • Cancer Brother         skin   • Heart Disease Maternal Grandfather    • Stroke Maternal Grandfather    • Heart Disease Paternal Grandfather    • Stroke Paternal Grandfather      Review of Systems   Constitutional: Negative for chills and fever.   HENT: Positive for congestion. Negative for sore throat.    Respiratory: Positive for cough. Negative for shortness of breath.    Gastrointestinal: Negative for nausea and vomiting.   Musculoskeletal: Negative for myalgias.   Skin: Negative for rash.   Neurological: Negative for dizziness.        Objective:   /72 (BP Location: Right arm, Patient Position: Sitting, BP Cuff Size: Adult)   Pulse 84   Temp 36.5 °C (97.7 °F) (Temporal)   Resp 18   Ht 1.626 m (5' 4\")   Wt 113 kg (249 lb)   LMP 07/26/2012   SpO2 93%   BMI 42.74 kg/m²   Physical Exam  Vitals and nursing note reviewed.   Constitutional:       General: She is not in acute distress.     Appearance: She is well-developed.   HENT:      Head: Normocephalic and atraumatic.      Right Ear: External ear normal.      Left Ear: External ear normal.      Nose: Congestion and rhinorrhea present.      Mouth/Throat:      Mouth: Mucous membranes are moist.      Pharynx: No oropharyngeal exudate or posterior oropharyngeal erythema.   Eyes:      Conjunctiva/sclera: Conjunctivae normal.   Cardiovascular:      Rate and Rhythm: Normal rate.   Pulmonary:      Effort: Pulmonary effort is normal. No respiratory distress.      Breath sounds: Normal breath sounds. No wheezing or " rhonchi.   Abdominal:      General: There is no distension.   Musculoskeletal:         General: Normal range of motion.   Skin:     General: Skin is warm and dry.   Neurological:      General: No focal deficit present.      Mental Status: She is alert and oriented to person, place, and time. Mental status is at baseline.      Gait: Gait (gait at baseline) normal.   Psychiatric:         Judgment: Judgment normal.           Assessment/Plan:   1. Suspected COVID-19 virus infection  - Nirmatrelvir & Ritonavir 20 x 150 MG & 10 x 100MG Tablet Therapy Pack; Take 300 mg nirmatrelvir (two 150 mg tablets) with 100 mg  ritonavir (one 100 mg tablet) by mouth, with all three tablets taken together  twice daily for 5 days.  Dispense: 30 Each; Refill: 0    2. Exposure to COVID-19 virus  - Nirmatrelvir & Ritonavir 20 x 150 MG & 10 x 100MG Tablet Therapy Pack; Take 300 mg nirmatrelvir (two 150 mg tablets) with 100 mg  ritonavir (one 100 mg tablet) by mouth, with all three tablets taken together  twice daily for 5 days.  Dispense: 30 Each; Refill: 0    3. Cough    Advised routine [facility] social distancing guidelines, symptomatic and supportive measures      Medical Decision Making/Course:  In the course of preparing for this visit with review of the pertinent past medical history, recent and past clinic visits, current medications, and performing chart, immunization, medical history and medication reconciliation, and in the further course of obtaining the current history pertinent to the clinic visit today, performing an exam and evaluation, ordering and independently evaluating labs, tests including SARS CoV-2 by PCR testing   , and/or procedures, prescribing any recommended new medications as noted above including in the context of shared decision making with the patient's recent direct exposure to home contact with COVID-19 and in the further context of the patient's comorbidities and patient's specific request for antiviral  medications for treatment of potential COVID-19 and the patient verbalization of understanding of emergency use authorization of Paxlovid for COVID-19 and no history of renal insufficiency, a prescription for ritonavir and nirmatrelvir was initiated and including providing any pertinent counseling and education and recommending further coordination of care including symptomatic and supportive measures, at least  30 minutes of total time were spent during this encounter.      Discussed close monitoring, return precautions, and supportive measures of maintaining adequate fluid hydration and caloric intake, relative rest and symptom management as needed for pain and/or fever.    Differential diagnosis, natural history, supportive care, and indications for immediate follow-up discussed.     Advised the patient to follow-up with the primary care physician for recheck, reevaluation, and consideration of further management.    Please note that this dictation was created using voice recognition software. I have worked with consultants from the vendor as well as technical experts from [facility] [employer] to optimize the interface. I have made every reasonable attempt to correct obvious errors, but I expect that there are errors of grammar and possibly content that I did not discover before finalizing the note.

## 2022-06-21 NOTE — PATIENT INSTRUCTIONS
INSTRUCTIONS FOR COVID-19 OR ANY OTHER INFECTIOUS RESPIRATORY ILLNESSES    Symptoms of viral acute respiratory illnesses (e.g COVID-19, Influenza, RSV etc.) may include: cough, shortness of breath or difficulty breathing, fever or chills, muscle or body aches, headache, sore throat, congestion or runny nose, nausea or vomiting, diarrhea, or new loss of taste or smell. Symptoms can range from mild to severe and may appear up to two weeks after exposure to infectious virus.    The practice of self-isolation and quarantine helps protect the public and your family by  preventing exposure to people who have or may have a contagious disease. Please follow the prevention steps below:    WHEN TO STOP ISOLATION: Persons with COVID-19 or any other infectious respiratory illness who have symptoms and were advised to care for themselves at home may discontinue home isolation under the following conditions:  At least 24 hours have passed since recovery defined as resolution of fever without the use of fever-reducing medications; AND,  Improvement in respiratory symptoms (e.g., cough, shortness of breath); AND,  At least 5 days have passed since symptoms first appeared. Immunocompromised* individual need to isolate for 10 days.  Wear a well-fitting mask for an additional 5 days anytime you are away from your home or around other people. If you are unable to wear one, maintain a minimum of 6 feet distancing from others.    *Definition of immunocompromised   1. Active treatment for solid tumor or hematologic malignancy  2. Receipt of solid organ transplant and taking immunosuppressant  3. Moderate or severe primary immunodeficiency  4. Advanced or untreated HIV infection  5. Active treatment with high-dose corticosteroids, chemotherapy, TNF blockers or other biological agent    MONITOR YOUR SYMPTOMS: If your illness is worsening, seek prompt medical attention.   ACTIVITY RESTRICTION: restrict activities outside your home, except  for getting medical care. Do not go to work, school, or public areas. Avoid using public transportation, ride-sharing, or taxis.    LIVING ENVIRONMENT: Stay in a separate room from other people and pets. If possible, use a separate bathroom and avoid sharing household items. Any items used should be washed thoroughly with soap and water. Clean all “high-touch” surfaces every day. Use a household cleaning spray or wipe, according to the label instructions. High touch surfaces include (but are not limited to) counters, tabletops, doorknobs, bathroom fixtures, toilets, phones, keyboards, tablets, and bedside tables.     HAND WASHING: Frequently wash hands with soap and water for at least 20 seconds, especially after blowing your nose, coughing, or sneezing; going to the bathroom; before and after interacting with pets; and before and after eating or preparing food. If hands are visibly dirty use soap and water. If soap and water are not available, use an alcohol-based hand  with at least 60% alcohol. Avoid touching your eyes, nose, and mouth with unwashed hands. Cover your coughs and sneezes with a tissue. Throw used tissues in a lined trash can. Immediately wash your hands.    ACTIVE/FACILITATED SELF-MONITORING: Follow instructions provided by your local health department or health professionals, as appropriate. When working with your local health department check their available hours.    [facility]   Phone Number   [place] [phone]   [facility] [phone]   [place] [address]   [place] [phone]

## 2022-07-28 ENCOUNTER — HOSPITAL ENCOUNTER (OUTPATIENT)
Dept: RADIOLOGY | Facility: MEDICAL CENTER | Age: 67
End: 2022-07-28
Attending: UROLOGY
Payer: COMMERCIAL

## 2022-07-28 DIAGNOSIS — N20.0 CALCULUS OF KIDNEY: ICD-10-CM

## 2022-07-28 PROCEDURE — 74018 RADEX ABDOMEN 1 VIEW: CPT

## 2022-08-05 ENCOUNTER — HOSPITAL ENCOUNTER (OUTPATIENT)
Dept: LAB | Facility: MEDICAL CENTER | Age: 67
End: 2022-08-05
Attending: INTERNAL MEDICINE
Payer: COMMERCIAL

## 2022-08-05 PROCEDURE — 36415 COLL VENOUS BLD VENIPUNCTURE: CPT

## 2022-08-05 PROCEDURE — 84443 ASSAY THYROID STIM HORMONE: CPT

## 2022-08-05 PROCEDURE — 84439 ASSAY OF FREE THYROXINE: CPT

## 2022-08-06 LAB
T4 FREE SERPL-MCNC: 1.22 NG/DL (ref 0.93–1.7)
TSH SERPL DL<=0.005 MIU/L-ACNC: 2.2 UIU/ML (ref 0.38–5.33)

## 2022-09-28 ENCOUNTER — APPOINTMENT (RX ONLY)
Dept: URBAN - METROPOLITAN AREA CLINIC 22 | Facility: CLINIC | Age: 67
Setting detail: DERMATOLOGY
End: 2022-09-28

## 2022-09-28 ENCOUNTER — HOSPITAL ENCOUNTER (OUTPATIENT)
Dept: LAB | Facility: MEDICAL CENTER | Age: 67
End: 2022-09-28
Attending: INTERNAL MEDICINE
Payer: COMMERCIAL

## 2022-09-28 DIAGNOSIS — D22 MELANOCYTIC NEVI: ICD-10-CM

## 2022-09-28 DIAGNOSIS — L81.4 OTHER MELANIN HYPERPIGMENTATION: ICD-10-CM

## 2022-09-28 DIAGNOSIS — Z71.89 OTHER SPECIFIED COUNSELING: ICD-10-CM

## 2022-09-28 DIAGNOSIS — L82.1 OTHER SEBORRHEIC KERATOSIS: ICD-10-CM

## 2022-09-28 DIAGNOSIS — D18.0 HEMANGIOMA: ICD-10-CM

## 2022-09-28 DIAGNOSIS — L57.0 ACTINIC KERATOSIS: ICD-10-CM

## 2022-09-28 PROBLEM — D22.5 MELANOCYTIC NEVI OF TRUNK: Status: ACTIVE | Noted: 2022-09-28

## 2022-09-28 PROBLEM — D18.01 HEMANGIOMA OF SKIN AND SUBCUTANEOUS TISSUE: Status: ACTIVE | Noted: 2022-09-28

## 2022-09-28 LAB
T4 FREE SERPL-MCNC: 1.19 NG/DL (ref 0.93–1.7)
TSH SERPL DL<=0.005 MIU/L-ACNC: 1.48 UIU/ML (ref 0.38–5.33)

## 2022-09-28 PROCEDURE — 99213 OFFICE O/P EST LOW 20 MIN: CPT | Mod: 25

## 2022-09-28 PROCEDURE — 84439 ASSAY OF FREE THYROXINE: CPT

## 2022-09-28 PROCEDURE — ? LIQUID NITROGEN

## 2022-09-28 PROCEDURE — ? COUNSELING

## 2022-09-28 PROCEDURE — ? SUNSCREEN RECOMMENDATIONS

## 2022-09-28 PROCEDURE — 17000 DESTRUCT PREMALG LESION: CPT

## 2022-09-28 PROCEDURE — 84443 ASSAY THYROID STIM HORMONE: CPT

## 2022-09-28 PROCEDURE — 36415 COLL VENOUS BLD VENIPUNCTURE: CPT

## 2022-09-28 ASSESSMENT — LOCATION SIMPLE DESCRIPTION DERM
LOCATION SIMPLE: LEFT UPPER BACK
LOCATION SIMPLE: ABDOMEN
LOCATION SIMPLE: RIGHT UPPER BACK
LOCATION SIMPLE: NOSE
LOCATION SIMPLE: LEFT FOREARM

## 2022-09-28 ASSESSMENT — LOCATION ZONE DERM
LOCATION ZONE: ARM
LOCATION ZONE: TRUNK
LOCATION ZONE: NOSE

## 2022-09-28 ASSESSMENT — LOCATION DETAILED DESCRIPTION DERM
LOCATION DETAILED: NASAL ROOT
LOCATION DETAILED: LEFT LATERAL ABDOMEN
LOCATION DETAILED: LEFT PROXIMAL DORSAL FOREARM
LOCATION DETAILED: LEFT INFERIOR UPPER BACK
LOCATION DETAILED: RIGHT MID-UPPER BACK

## 2022-12-06 ENCOUNTER — HOSPITAL ENCOUNTER (OUTPATIENT)
Dept: LAB | Facility: MEDICAL CENTER | Age: 67
End: 2022-12-06
Attending: INTERNAL MEDICINE
Payer: MEDICARE

## 2022-12-06 DIAGNOSIS — E78.5 DYSLIPIDEMIA: ICD-10-CM

## 2022-12-06 DIAGNOSIS — Z13.0 SCREENING FOR DEFICIENCY ANEMIA: ICD-10-CM

## 2022-12-06 LAB
ALBUMIN SERPL BCP-MCNC: 4.6 G/DL (ref 3.2–4.9)
ALBUMIN/GLOB SERPL: 1.8 G/DL
ALP SERPL-CCNC: 82 U/L (ref 30–99)
ALT SERPL-CCNC: 14 U/L (ref 2–50)
ANION GAP SERPL CALC-SCNC: 8 MMOL/L (ref 7–16)
AST SERPL-CCNC: 19 U/L (ref 12–45)
BASOPHILS # BLD AUTO: 0.7 % (ref 0–1.8)
BASOPHILS # BLD: 0.05 K/UL (ref 0–0.12)
BILIRUB SERPL-MCNC: 1.5 MG/DL (ref 0.1–1.5)
BUN SERPL-MCNC: 16 MG/DL (ref 8–22)
CALCIUM SERPL-MCNC: 9.6 MG/DL (ref 8.5–10.5)
CHLORIDE SERPL-SCNC: 105 MMOL/L (ref 96–112)
CHOLEST SERPL-MCNC: 202 MG/DL (ref 100–199)
CO2 SERPL-SCNC: 29 MMOL/L (ref 20–33)
CREAT SERPL-MCNC: 0.69 MG/DL (ref 0.5–1.4)
EOSINOPHIL # BLD AUTO: 0.12 K/UL (ref 0–0.51)
EOSINOPHIL NFR BLD: 1.7 % (ref 0–6.9)
ERYTHROCYTE [DISTWIDTH] IN BLOOD BY AUTOMATED COUNT: 44.3 FL (ref 35.9–50)
FASTING STATUS PATIENT QL REPORTED: NORMAL
GFR SERPLBLD CREATININE-BSD FMLA CKD-EPI: 96 ML/MIN/1.73 M 2
GLOBULIN SER CALC-MCNC: 2.6 G/DL (ref 1.9–3.5)
GLUCOSE SERPL-MCNC: 92 MG/DL (ref 65–99)
HCT VFR BLD AUTO: 48.8 % (ref 37–47)
HDLC SERPL-MCNC: 54 MG/DL
HGB BLD-MCNC: 15.9 G/DL (ref 12–16)
IMM GRANULOCYTES # BLD AUTO: 0.04 K/UL (ref 0–0.11)
IMM GRANULOCYTES NFR BLD AUTO: 0.6 % (ref 0–0.9)
LDLC SERPL CALC-MCNC: 125 MG/DL
LYMPHOCYTES # BLD AUTO: 2.19 K/UL (ref 1–4.8)
LYMPHOCYTES NFR BLD: 31.4 % (ref 22–41)
MCH RBC QN AUTO: 30.2 PG (ref 27–33)
MCHC RBC AUTO-ENTMCNC: 32.6 G/DL (ref 33.6–35)
MCV RBC AUTO: 92.6 FL (ref 81.4–97.8)
MONOCYTES # BLD AUTO: 0.65 K/UL (ref 0–0.85)
MONOCYTES NFR BLD AUTO: 9.3 % (ref 0–13.4)
NEUTROPHILS # BLD AUTO: 3.93 K/UL (ref 2–7.15)
NEUTROPHILS NFR BLD: 56.3 % (ref 44–72)
NRBC # BLD AUTO: 0 K/UL
NRBC BLD-RTO: 0 /100 WBC
PLATELET # BLD AUTO: 239 K/UL (ref 164–446)
PMV BLD AUTO: 10.7 FL (ref 9–12.9)
POTASSIUM SERPL-SCNC: 3.7 MMOL/L (ref 3.6–5.5)
PROT SERPL-MCNC: 7.2 G/DL (ref 6–8.2)
RBC # BLD AUTO: 5.27 M/UL (ref 4.2–5.4)
SODIUM SERPL-SCNC: 142 MMOL/L (ref 135–145)
TRIGL SERPL-MCNC: 116 MG/DL (ref 0–149)
WBC # BLD AUTO: 7 K/UL (ref 4.8–10.8)

## 2022-12-06 PROCEDURE — 36415 COLL VENOUS BLD VENIPUNCTURE: CPT

## 2022-12-06 PROCEDURE — 85025 COMPLETE CBC W/AUTO DIFF WBC: CPT

## 2022-12-06 PROCEDURE — 80053 COMPREHEN METABOLIC PANEL: CPT

## 2022-12-06 PROCEDURE — 80061 LIPID PANEL: CPT

## 2022-12-09 ENCOUNTER — OFFICE VISIT (OUTPATIENT)
Dept: MEDICAL GROUP | Facility: PHYSICIAN GROUP | Age: 67
End: 2022-12-09
Payer: MEDICARE

## 2022-12-09 VITALS
SYSTOLIC BLOOD PRESSURE: 112 MMHG | BODY MASS INDEX: 43.19 KG/M2 | TEMPERATURE: 98.3 F | RESPIRATION RATE: 16 BRPM | DIASTOLIC BLOOD PRESSURE: 58 MMHG | WEIGHT: 253 LBS | OXYGEN SATURATION: 95 % | HEART RATE: 70 BPM | HEIGHT: 64 IN

## 2022-12-09 DIAGNOSIS — E78.2 MIXED HYPERLIPIDEMIA: ICD-10-CM

## 2022-12-09 DIAGNOSIS — N95.1 MENOPAUSAL STATE: ICD-10-CM

## 2022-12-09 DIAGNOSIS — E05.90 SUBCLINICAL HYPERTHYROIDISM: Chronic | ICD-10-CM

## 2022-12-09 DIAGNOSIS — F41.1 GAD (GENERALIZED ANXIETY DISORDER): Chronic | ICD-10-CM

## 2022-12-09 DIAGNOSIS — I10 PRIMARY HYPERTENSION: ICD-10-CM

## 2022-12-09 DIAGNOSIS — J30.89 ALLERGIC RHINITIS DUE TO OTHER ALLERGIC TRIGGER, UNSPECIFIED SEASONALITY: ICD-10-CM

## 2022-12-09 DIAGNOSIS — J45.20 MILD INTERMITTENT ASTHMA WITHOUT COMPLICATION: ICD-10-CM

## 2022-12-09 DIAGNOSIS — Z00.00 ENCOUNTER FOR MEDICARE ANNUAL WELLNESS EXAM: ICD-10-CM

## 2022-12-09 PROBLEM — N20.0 KIDNEY STONE: Status: ACTIVE | Noted: 2022-08-03

## 2022-12-09 PROBLEM — N28.1 RENAL CYST: Status: ACTIVE | Noted: 2022-08-03

## 2022-12-09 PROCEDURE — G0402 INITIAL PREVENTIVE EXAM: HCPCS | Performed by: INTERNAL MEDICINE

## 2022-12-09 RX ORDER — CHLORPHENIRAMINE MALEATE 4 MG/1
TABLET ORAL
COMMUNITY
End: 2022-12-09

## 2022-12-09 RX ORDER — AZELASTINE 1 MG/ML
SPRAY, METERED NASAL
COMMUNITY
Start: 2022-09-18 | End: 2023-07-05

## 2022-12-09 RX ORDER — FLUTICASONE PROPIONATE 50 MCG
SPRAY, SUSPENSION (ML) NASAL
COMMUNITY

## 2022-12-09 RX ORDER — CETIRIZINE HYDROCHLORIDE 10 MG/1
TABLET ORAL
COMMUNITY

## 2022-12-09 SDOH — ECONOMIC STABILITY: FOOD INSECURITY: WITHIN THE PAST 12 MONTHS, YOU WORRIED THAT YOUR FOOD WOULD RUN OUT BEFORE YOU GOT MONEY TO BUY MORE.: NEVER TRUE

## 2022-12-09 SDOH — HEALTH STABILITY: PHYSICAL HEALTH: ON AVERAGE, HOW MANY MINUTES DO YOU ENGAGE IN EXERCISE AT THIS LEVEL?: 0 MIN

## 2022-12-09 SDOH — ECONOMIC STABILITY: HOUSING INSECURITY
IN THE LAST 12 MONTHS, WAS THERE A TIME WHEN YOU DID NOT HAVE A STEADY PLACE TO SLEEP OR SLEPT IN A SHELTER (INCLUDING NOW)?: NO

## 2022-12-09 SDOH — ECONOMIC STABILITY: INCOME INSECURITY: IN THE LAST 12 MONTHS, WAS THERE A TIME WHEN YOU WERE NOT ABLE TO PAY THE MORTGAGE OR RENT ON TIME?: NO

## 2022-12-09 SDOH — ECONOMIC STABILITY: FOOD INSECURITY: WITHIN THE PAST 12 MONTHS, THE FOOD YOU BOUGHT JUST DIDN'T LAST AND YOU DIDN'T HAVE MONEY TO GET MORE.: NEVER TRUE

## 2022-12-09 SDOH — HEALTH STABILITY: PHYSICAL HEALTH: ON AVERAGE, HOW MANY DAYS PER WEEK DO YOU ENGAGE IN MODERATE TO STRENUOUS EXERCISE (LIKE A BRISK WALK)?: 0 DAYS

## 2022-12-09 SDOH — HEALTH STABILITY: MENTAL HEALTH
STRESS IS WHEN SOMEONE FEELS TENSE, NERVOUS, ANXIOUS, OR CAN'T SLEEP AT NIGHT BECAUSE THEIR MIND IS TROUBLED. HOW STRESSED ARE YOU?: TO SOME EXTENT

## 2022-12-09 SDOH — ECONOMIC STABILITY: TRANSPORTATION INSECURITY
IN THE PAST 12 MONTHS, HAS LACK OF TRANSPORTATION KEPT YOU FROM MEETINGS, WORK, OR FROM GETTING THINGS NEEDED FOR DAILY LIVING?: NO

## 2022-12-09 SDOH — ECONOMIC STABILITY: TRANSPORTATION INSECURITY
IN THE PAST 12 MONTHS, HAS THE LACK OF TRANSPORTATION KEPT YOU FROM MEDICAL APPOINTMENTS OR FROM GETTING MEDICATIONS?: NO

## 2022-12-09 SDOH — ECONOMIC STABILITY: HOUSING INSECURITY: IN THE LAST 12 MONTHS, HOW MANY PLACES HAVE YOU LIVED?: 1

## 2022-12-09 SDOH — ECONOMIC STABILITY: INCOME INSECURITY: HOW HARD IS IT FOR YOU TO PAY FOR THE VERY BASICS LIKE FOOD, HOUSING, MEDICAL CARE, AND HEATING?: NOT HARD AT ALL

## 2022-12-09 SDOH — ECONOMIC STABILITY: TRANSPORTATION INSECURITY
IN THE PAST 12 MONTHS, HAS LACK OF RELIABLE TRANSPORTATION KEPT YOU FROM MEDICAL APPOINTMENTS, MEETINGS, WORK OR FROM GETTING THINGS NEEDED FOR DAILY LIVING?: NO

## 2022-12-09 ASSESSMENT — SOCIAL DETERMINANTS OF HEALTH (SDOH)
HOW OFTEN DO YOU GET TOGETHER WITH FRIENDS OR RELATIVES?: TWICE A WEEK
IN A TYPICAL WEEK, HOW MANY TIMES DO YOU TALK ON THE PHONE WITH FAMILY, FRIENDS, OR NEIGHBORS?: MORE THAN THREE TIMES A WEEK
HOW OFTEN DO YOU ATTEND CHURCH OR RELIGIOUS SERVICES?: NEVER
HOW OFTEN DO YOU GET TOGETHER WITH FRIENDS OR RELATIVES?: TWICE A WEEK
DO YOU BELONG TO ANY CLUBS OR ORGANIZATIONS SUCH AS CHURCH GROUPS UNIONS, FRATERNAL OR ATHLETIC GROUPS, OR SCHOOL GROUPS?: NO
HOW OFTEN DO YOU ATTEND CHURCH OR RELIGIOUS SERVICES?: NEVER
HOW OFTEN DO YOU ATTENT MEETINGS OF THE CLUB OR ORGANIZATION YOU BELONG TO?: PATIENT DECLINED
HOW OFTEN DO YOU ATTENT MEETINGS OF THE CLUB OR ORGANIZATION YOU BELONG TO?: PATIENT DECLINED
WITHIN THE PAST 12 MONTHS, YOU WORRIED THAT YOUR FOOD WOULD RUN OUT BEFORE YOU GOT THE MONEY TO BUY MORE: NEVER TRUE
HOW HARD IS IT FOR YOU TO PAY FOR THE VERY BASICS LIKE FOOD, HOUSING, MEDICAL CARE, AND HEATING?: NOT HARD AT ALL
HOW OFTEN DO YOU HAVE SIX OR MORE DRINKS ON ONE OCCASION: NEVER
DO YOU BELONG TO ANY CLUBS OR ORGANIZATIONS SUCH AS CHURCH GROUPS UNIONS, FRATERNAL OR ATHLETIC GROUPS, OR SCHOOL GROUPS?: NO
HOW MANY DRINKS CONTAINING ALCOHOL DO YOU HAVE ON A TYPICAL DAY WHEN YOU ARE DRINKING: 1 OR 2
IN A TYPICAL WEEK, HOW MANY TIMES DO YOU TALK ON THE PHONE WITH FAMILY, FRIENDS, OR NEIGHBORS?: MORE THAN THREE TIMES A WEEK
HOW OFTEN DO YOU HAVE A DRINK CONTAINING ALCOHOL: MONTHLY OR LESS

## 2022-12-09 ASSESSMENT — LIFESTYLE VARIABLES
HOW OFTEN DO YOU HAVE A DRINK CONTAINING ALCOHOL: MONTHLY OR LESS
HOW MANY STANDARD DRINKS CONTAINING ALCOHOL DO YOU HAVE ON A TYPICAL DAY: 1 OR 2
HOW OFTEN DO YOU HAVE SIX OR MORE DRINKS ON ONE OCCASION: NEVER
AUDIT-C TOTAL SCORE: 1
SKIP TO QUESTIONS 9-10: 1

## 2022-12-09 ASSESSMENT — FIBROSIS 4 INDEX: FIB4 SCORE: 1.38

## 2022-12-09 ASSESSMENT — PATIENT HEALTH QUESTIONNAIRE - PHQ9: CLINICAL INTERPRETATION OF PHQ2 SCORE: 0

## 2022-12-09 ASSESSMENT — ENCOUNTER SYMPTOMS: GENERAL WELL-BEING: FAIR

## 2022-12-09 ASSESSMENT — ACTIVITIES OF DAILY LIVING (ADL): BATHING_REQUIRES_ASSISTANCE: 0

## 2022-12-09 NOTE — PROGRESS NOTES
Subjective:     CC: No chief complaint on file.      HPI:           Past Medical History:   Diagnosis Date    Anxiety     Arrhythmia 11/2019    Flutter feeling, vibration, palpitation, PVC's occ, had [place] Patch in Dec 2019    Arthritis 01/21/2020    Osteo in knees    ASTHMA     Chronic pain of both knees 6/20/2018    Bilateral cortisone shots to both knees in Jan 2019    Daytime sleepiness     due to knee pain , busy mind , Naps once a day OCC    Dyslipidemia     Dysuria 5/27/2020    Edema extremities 5/30/2018     IMO 10/1 Regulatory Update    Gasping for breath     GERD (gastroesophageal reflux disease)     Heart burn 01/21/2020    Heart palpitations 8/14/2019     IMO load March 2020    Hemorrhage     Hemorrhoids 11/12/15    colonoscopy     Hypertension     Infectious disease 01/21/2020    Sick grandchild and  with flu and cold    Insomnia     trouble going to sleep and staying asleep    Low serum vitamin D 9/19/2017    Multiple thyroid nodules 01/21/2020    enlarged thyroid with nodules, Followed by Dr. Collins    Non-seasonal allergic rhinitis     Non-smoker 9/28/2020    Obesity -- 43 7/27/2011    Pain 01/21/2020    knees    RAD (reactive airway disease) 7/1/2011    Only issues with colds, environmental    Rash 5/14/2021    Simple phobia     flying    Sinus infection 01/21/2020    currently being treated with antibiotics and steroids by Dr. Quinones    Sleep apnea 01/21/2020    Sleep study on 5/1/20    Snoring     Urinary incontinence 01/21/2020    Uses Maxi pads    Varicose vein of leg 7/10/2015       Social History     Tobacco Use    Smoking status: Never    Smokeless tobacco: Never   Vaping Use    Vaping Use: Never used   Substance Use Topics    Alcohol use: Yes     Comment: twice a month    Drug use: No       Current Outpatient Medications Ordered in Epic   Medication Sig Dispense Refill    hydroCHLOROthiazide (HYDRODIURIL) 25 MG Tab Take 1 Tablet by mouth every day. 90 Tablet 0    meloxicam (MOBIC) 7.5  MG Tab TAKE 1 TABLET BY MOUTH EVERY DAY 30 Tablet 2    Nirmatrelvir & Ritonavir 20 x 150 MG & 10 x 100MG Tablet Therapy Pack Take 300 mg nirmatrelvir (two 150 mg tablets) with 100 mg  ritonavir (one 100 mg tablet) by mouth, with all three tablets taken together  twice daily for 5 days. 30 Each 0    amLODIPine (NORVASC) 5 MG Tab Take 1 Tablet by mouth every day. 90 Tablet 3    VENTOLIN  (90 Base) MCG/ACT Aero Soln inhalation aerosol Inhale 2 Puffs every 6 hours as needed for Shortness of Breath. 18 g 2    sertraline (ZOLOFT) 50 MG Tab Take 1 Tablet by mouth every day. 90 Tablet 3    methimazole (TAPAZOLE) 5 MG Tab 1 tablet for 7 d then 1/2 qd      fluticasone (FLONASE) 50 MCG/ACT nasal spray SPRAY 2 SPRAYS IN EACH NOSTRIL EVERY DAY 1 Bottle 6    vitamin D (CHOLECALCIFEROL) 1000 UNIT TABS Take 1,000 Units by mouth every day.      Cetirizine HCl (ZYRTEC PO) Take  by mouth every day.      Multiple Vitamins-Minerals (WOMENS MULTI VITAMIN & MINERAL PO) Take  by mouth every day. (Patient not taking: Reported on 6/20/2022)       No current [facility]-ordered facility-administered medications on file.       Allergies:  Augmentin, Cephalexin, Pcn [penicillins], Sulfa drugs, Diclofenac sodium-propylene glycol, Ibuprofen, Keflex, Penicillin g, and Latex    Health Maintenance: Completed    Review of Systems:  No fevers or chills. No cough, chest pain, or shortness of breath.     Review of Systems:  Constitutional: Negative for fever, chills.  Respiratory: Negative for cough and shortness of breath.    Cardiovascular: Negative for chest pain or palpitations.  Gastrointestinal: Negative for abdominal pain, nausea, vomiting, and diarrhea.   Neurological: Negative for headaches, numbness, or tingling.  Psychiatric: Negative for anxiety or depression.      Objective:       Exam:  LMP 07/26/2012  There is no height or weight on file to calculate BMI.    Physical Exam:  Constitutional: Well-developed, no acute distress.  Lungs: Clear  to auscultation bilaterally. No wheezes, ronchi, or rales.   Cardiovascular: Regular rate and rhythm, no murmurs noted.   Extremities: No edema or erythema.    Physical Exam:  Constitutional: Well-developed, no acute distress.  HEENT: Pupils are equal, round, and reactive to light. Oropharynx is without erythema, edema or exudates.   Neck: No thyromegaly or palpable thyroid nodules. No cervical or supraclavicular lymphadenopathy noted.  Lungs: Clear to auscultation bilaterally. No wheezes, rhonchi, or rales.   Cardiovascular: Regular rate and rhythm, no murmurs noted.   Abdomen: Soft, nontender, nondistended.   Extremities: No edema or erythema.  Psychiatric:  Behavior, mood, and affect are appropriate.    Assessment & Plan:     67 y.o. female with the following -     Kiley Sheth is a 67-year-old female who presents for annual wellness exam. The patient feels well and denies any acute medical complaints.  The patient's past medical and surgical history, medications, allergies, and family history were reviewed.  The patient is up-to-date on or due for labs, immunizations, and other preventative care.    Health Maintenance  Diet:   Exercise:   Substance Use:   Sunscreen used.    There are no concerning signs and/or symptoms of any significant disease process.  Normal exam findings.  Discussed regular exercise, healthy diet, abstinence from smoking, drugs, and excessive alcohol.     Subclinical hyperthyroidism  Chronic medical condition.  Ongoing and well-controlled.  The patient is managed by endocrinology, Dr. Collins.  The patient is taking methimazole 2.5 mg daily.  Labs on 9/28/2022 showed a normal TSH of 1.48 and a normal free T4 of 1.19.  Thyroid uptake scan on 4/5/2021 was normal.  She is clinically euthyroid.  -Continue methimazole 2.5 mg daily as managed per endocrinology     Primary hypertension  Chronic medical condition.   Ongoing and well-controlled.  The patient is taking amlodipine 5 mg daily and  HCTZ 25 mg daily.  Blood pressure today's visit is.  She denies new headaches, vision changes, chest pain, shortness of breath, lower extremity edema.   -Continue HCTZ 25 mg daily and amlodipine 5 mg daily     Mixed hyperlipidemia  Chronic medical condition.  Diet controlled.  Lipid panel from 12/6/2022 showed a total cholesterol 202, , HDL 54, triglycerides 116.   The 10-year ASCVD risk score (Tony MAGANA, et al., 2019) is: 7.3%  -Continue dietary management with a low cholesterol diet given the patient's borderline 10-year ASCVD risk score versus start a statin medication  - Lipid Profile; Future  - Comp Metabolic Panel; Future     REINALDO (generalized anxiety disorder)  Chronic medical condition.  Stable.  The patient is taking sertraline 50 mg daily and alprazolam 0.25 mg as needed for anxiety.  Review of the patient's  shows that she was last given a prescription for alprazolam 0.25 mg, dispo #10, on 3/30/2021. Obtained and reviewed patient utilization report from [facility] pharmacy database on 12/9/2022 5:23 AM  prior to writing prescription for controlled substance II, III or IV per [place] bill . Based on assessment of the report, the prescription is medically necessary.   -Continue sertraline 50 mg daily and alprazolam 0.25 mg as needed     Allergic rhinitis  Mild intermittent asthma without complication  This is a chronic condition.  Well-controlled.  -Continue cetirizine 10 mg daily  -Continue fluticasone nasal spray  -Continue albuterol as needed     Microscopic hematuria  This is a chronic medical condition.  Stable.  CT abdomen and pelvis 1/25/2021 showed a 12 mm nonenhancing cyst in the left kidney and 2 mm nonobstructing left kidney stone.  Cystoscopy was normal.  Urology feels the recurrent hematuria is secondary to movements of the kidney stone.  -Continue to monitor    EVE (obstructive sleep apnea)  This is a chronic medical condition.  The patient had an overnight sleep study on  11/18/2021 which showed severe obstructive sleep apnea with an NANCY of 59.8/h.  O2 saturation gabriela was 70%, average O2 was 87%, and baseline O2 was at 95%.  It was recommended that the patient get a CPAP titration study versus auto CPAP trial.  The patient has follow-up with pulmonary.     Encounter for screening mammogram for malignant neoplasm of breast  - MA-SCREENING MAMMO BILAT W/TOMOSYNTHESIS W/CAD; Future     Screening for deficiency anemia  - CBC WITH DIFFERENTIAL; Future    HCM: Completed  Labs per orders  Immunizations per orders  Patient counseled about regular exercise, healthy diet, abstinence from smoking, drugs, and excessive alcohol.     I spent a total of *** minutes with record review, exam, communication with the patient, communication with other providers, and documentation of this encounter.      No follow-ups on file.    Please note that this dictation was created using voice recognition software. I have made every reasonable attempt to correct obvious errors, but I expect that there are errors of grammar and possibly content that I did not discover before finalizing the note.

## 2022-12-09 NOTE — PROGRESS NOTES
Chief Complaint   Patient presents with    Annual Wellness Visit       HPI:  Kiley Foss is a 67 y.o. here for Medicare Annual Wellness Visit     Patient Active Problem List    Diagnosis Date Noted    Renal cyst 08/03/2022    Kidney stone 08/03/2022    Mixed hyperlipidemia 05/14/2021    Primary osteoarthritis of both knees 05/14/2021    Subclinical hyperthyroidism 03/30/2021    Microscopic hematuria 03/30/2021    Gilbert's disease 12/01/2020    EVE (obstructive sleep apnea) 09/26/2020    Allergic rhinitis 10/30/2018    Fear of flying 02/21/2017    Morbid obesity with BMI of 40.0-44.9, adult ([facility]) 02/21/2017    Primary hypertension 07/10/2015    REINALDO (generalized anxiety disorder)     Mild intermittent asthma without complication     GERD (gastroesophageal reflux disease)     Multiple thyroid nodules        Current Outpatient Medications   Medication Sig Dispense Refill    azelastine (ASTELIN) 137 MCG/SPRAY nasal spray SPRAY 1 SPRAY IN EACH NOSTRIL TWICE DAILY      cetirizine (ZYRTEC) 10 MG Tab Zyrtec 10 mg tablet   1 tablet every day by oral route.      fluticasone (FLONASE) 50 MCG/ACT nasal spray fluticasone propionate 50 mcg/actuation nasal spray,suspension   2 sprays every day by nasal route.      hydroCHLOROthiazide (HYDRODIURIL) 25 MG Tab Take 1 Tablet by mouth every day. 90 Tablet 0    meloxicam (MOBIC) 7.5 MG Tab TAKE 1 TABLET BY MOUTH EVERY DAY 30 Tablet 2    amLODIPine (NORVASC) 5 MG Tab Take 1 Tablet by mouth every day. 90 Tablet 3    VENTOLIN  (90 Base) MCG/ACT Aero Soln inhalation aerosol Inhale 2 Puffs every 6 hours as needed for Shortness of Breath. 18 g 2    sertraline (ZOLOFT) 50 MG Tab Take 1 Tablet by mouth every day. 90 Tablet 3    methimazole (TAPAZOLE) 5 MG Tab Just 1/2 tablet 4 days a week      fluticasone (FLONASE) 50 MCG/ACT nasal spray SPRAY 2 SPRAYS IN EACH NOSTRIL EVERY DAY 1 Bottle 6    vitamin D (CHOLECALCIFEROL) 1000 UNIT TABS Take 1,000 Units by mouth every day.       Multiple Vitamins-Minerals (WOMENS MULTI VITAMIN & MINERAL PO) Take  by mouth every day. (Patient not taking: Reported on 6/20/2022)       No current facility-administered medications for this visit.          Current supplements as per medication list.     Allergies: Augmentin, Cephalexin, Pcn [penicillins], Sulfa drugs, Diclofenac sodium-propylene glycol, Ibuprofen, Keflex, Penicillin g, and Latex    Current social contact/activities:  Time with family    She  reports that she has never smoked. She has never used smokeless tobacco. She reports current alcohol use. She reports that she does not use drugs.  Counseling given: Not Answered      ROS:    Gait: Uses a cane  Ostomy: No  Other tubes: No  Amputations: No  Chronic oxygen use: No  Last eye exam: 06/2022  Wears hearing aids: No   : Reports urinary leakage during the last 6 months that has somewhat interfered with their daily activities or sleep.    Screening:    Depression Screening  Little interest or pleasure in doing things?  0 - not at all  Feeling down, depressed , or hopeless? 0 - not at all  Patient Health Questionnaire Score: 0     If depressive symptoms identified deferred to follow up visit unless specifically addressed in assessment and plan.    Interpretation of PHQ-9 Total Score   Score Severity   1-4 No Depression   5-9 Mild Depression   10-14 Moderate Depression   15-19 Moderately Severe Depression   20-27 Severe Depression    Screening for Cognitive Impairment  Three Minute Recall (daughter, heaven, mountain) 3/3    Uriel clock face with all 12 numbers and set the hands to show 10 past 11.  Yes    Cognitive concerns identified deferred for follow up unless specifically addressed in assessment and plan.    Fall Risk Assessment  Has the patient had two or more falls in the last year or any fall with injury in the last year?  No    Safety Assessment  Throw rugs on floor.  Yes  Handrails on all stairs.  Yes  Good lighting in all hallways.   Yes  Difficulty hearing.  No  Patient counseled about all safety risks that were identified.    Functional Assessment ADLs  Are there any barriers preventing you from cooking for yourself or meeting nutritional needs?  No.    Are there any barriers preventing you from driving safely or obtaining transportation?  No.    Are there any barriers preventing you from using a telephone or calling for help?  No.    Are there any barriers preventing you from shopping?  No.    Are there any barriers preventing you from taking care of your own finances?  No.    Are there any barriers preventing you from managing your medications?  No.    Are there any barriers preventing you from showering, bathing or dressing yourself?  No.    Are you currently engaging in any exercise or physical activity?  No.     What is your perception of your health?  Fair    Advance Care Planning  Do you have an Advance Directive, Living Will, Durable Power of , or POLST? Yes  Advance Directive       is not on file - instructed patient to bring in a copy to scan into their chart      Health Maintenance Summary            Ordered - BONE DENSITY (Every 5 Years) Ordered on 12/9/2022      No completion history exists for this topic.              Overdue - IMM PNEUMOCOCCAL VACCINE: 65+ Years (2 - PCV) Overdue since 10/17/2013      10/17/2012  Imm Admin: Pneumococcal polysaccharide vaccine (PPSV-23)    10/17/2012  Imm Admin: Pneumococcal Conjugate Vaccine (Prevnar/PCV-13)              CERVICAL CANCER SCREENING (Every 3 Years) Next due on 8/6/2023 08/06/2020  PAP IG (IMAGE GUIDED)    01/05/2017  PATHOLOGY GYN SPECIMEN    01/05/2017  THINPREP PAP WITH HPV    08/14/2012  PAP IG (IMAGE GUIDED)              MAMMOGRAM (Every 2 Years) Next due on 2/8/2024 02/08/2022  MA-SCREENING MAMMO BILAT W/TOMOSYNTHESIS W/CAD    10/12/2020  MA-SCREENING MAMMO BILAT W/TOMOSYNTHESIS W/CAD    09/24/2019  MA-SCREENING MAMMO BILAT W/TOMOSYNTHESIS W/CAD     06/15/2018  MA-MAMMO SCREENING BILAT W/MOISE W/CAD    04/25/2017  MA-MAMMO SCREENING BILAT W/MOISE W/CAD    Only the first 5 history entries have been loaded, but more history exists.              COLORECTAL CANCER SCREENING (COLONOSCOPY - Every 5 Years) Next due on 3/25/2026      03/25/2021  COLONOSCOPY (Done)    11/12/2015  AMB REFERRAL TO GI FOR COLONOSCOPY              IMM DTaP/Tdap/Td Vaccine (2 - Td or Tdap) Next due on 7/31/2028 07/31/2018  Imm Admin: Tdap Vaccine              IMM ZOSTER VACCINES (Series Information) Completed      10/17/2018  Imm Admin: Zoster Vaccine Recombinant (RZV) (SHINGRIX)    07/31/2018  Imm Admin: Zoster Vaccine Recombinant (RZV) (SHINGRIX)              HEPATITIS C SCREENING  Completed      01/11/2020  HEP C VIRUS ANTIBODY              IMM INFLUENZA (Series Information) Completed      09/28/2022  Imm Admin: Influenza Vaccine Quad Inj (Pf)    10/10/2021  Imm Admin: Influenza Vaccine Quad Inj (Pf)    09/22/2020  Imm Admin: Influenza Vaccine Quad Recombinant    09/19/2019  Imm Admin: Influenza Vaccine Quad Inj (Pf)    10/11/2018  Imm Admin: Influenza Vaccine Quad Inj (Pf)    Only the first 5 history entries have been loaded, but more history exists.              COVID-19 Vaccine (Series Information) Completed      10/12/2022  Imm Admin: MODERNA BIVALENT BOOSTER SARS-COV-2 VACCINE (6+)    06/15/2022  Imm Admin: MODERNA SARS-COV-2 VACCINE (12+)    11/20/2021  Imm Admin: MODERNA SARS-COV-2 VACCINE (12+)    02/18/2021  Imm Admin: MODERNA SARS-COV-2 VACCINE (12+)    01/21/2021  Imm Admin: MODERNA SARS-COV-2 VACCINE (12+)              Annual Wellness Visit  Completed      12/11/2022  Initial Annual Wellness Visit - Includes PPPS ()    12/09/2022  Visit Dx: Encounter for Medicare annual wellness exam              IMM MENINGOCOCCAL ACWY VACCINE (Series Information) Aged Out      No completion history exists for this topic.              Discontinued - IMM HEP B VACCINE  Discontinued       No completion history exists for this topic.                    Patient Care Team:  Oralia Maciel M.D. as PCP - General (Internal Medicine)  Arnulfo Lind M.D. as Consulting Physician (Orthopedic Surgery)  Mitzi Collins M.D. as Consulting Physician (Endocrinology)  Kelley Buck M.D. as Consulting Physician (Surgery)  Vitor Magallon M.D. as Consulting Physician (OB & Gyn - Gynecology)  Robyn Quinones M.D. (Otolaryngology)  Paul Monson M.D. (Cardiovascular Disease (Cardiology))  Felipe St M.D. (Gastroenterology)  Balta Burnett M.D. (Urology)        Social History     Tobacco Use    Smoking status: Never    Smokeless tobacco: Never   Vaping Use    Vaping Use: Never used   Substance Use Topics    Alcohol use: Yes     Comment: twice a month    Drug use: No     Family History   Problem Relation Age of Onset    Hypertension Mother     Diabetes Mother     Cancer Father         Non Hodgekin's Lymphoma    Cancer Sister         cervical    Cancer Brother         skin    Heart Disease Maternal Grandfather     Stroke Maternal Grandfather     Heart Disease Paternal Grandfather     Stroke Paternal Grandfather      She  has a past medical history of Anxiety, Arrhythmia (11/2019), Arthritis (01/21/2020), ASTHMA, Chronic pain of both knees (6/20/2018), Daytime sleepiness, Dyslipidemia, Dysuria (5/27/2020), Edema extremities (5/30/2018), Gasping for breath, GERD (gastroesophageal reflux disease), Heart burn (01/21/2020), Heart palpitations (8/14/2019), Hemorrhage, Hemorrhoids (11/12/15), Hypertension, Infectious disease (01/21/2020), Insomnia, Low serum vitamin D (9/19/2017), Multiple thyroid nodules (01/21/2020), Non-seasonal allergic rhinitis, Non-smoker (9/28/2020), Obesity -- 43 (7/27/2011), Pain (01/21/2020), RAD (reactive airway disease) (7/1/2011), Rash (5/14/2021), Simple phobia, Sinus infection (01/21/2020), Sleep apnea (01/21/2020), Snoring, Urinary incontinence (01/21/2020), and Varicose vein  "of leg (7/10/2015).    She has no past medical history of Apnea, sleep, Breast cancer (HCC), or Morning headache.   Past Surgical History:   Procedure Laterality Date    BIOPSY GENERAL Bilateral 01/08/2020    moles removed back of left arm and right shoulder    COLONOSCOPY N/A 2016    Polyps and on 5 year plan for testing    BIOPSY GENERAL      thyroid     DENTAL EXTRACTION(S) N/A 1980's    3 extracted over a period of time    SALPINGECTOMY      ectopic pregnancy    SINUSOTOMIES      TONSILLECTOMY      TUBAL COAGULATION LAPAROSCOPIC BILATERAL         Exam:   /58 (BP Location: Right arm, Patient Position: Sitting, BP Cuff Size: Adult long)   Pulse 70   Temp 36.8 °C (98.3 °F) (Temporal)   Resp 16   Ht 1.626 m (5' 4\")   Wt 115 kg (253 lb)   SpO2 95%  Body mass index is 43.43 kg/m².    Hearing excellent.    Dentition good  Alert, oriented in no acute distress.  Eye contact is good, speech goal directed, affect calm    Assessment and Plan. The following treatment and monitoring plan is recommended:      Encounter for Medicare annual wellness exam  - Initial Annual Wellness Visit - Includes PPPS ()    Subclinical hyperthyroidism  Chronic medical condition.  Ongoing and well-controlled.  The patient is managed by endocrinology, Dr. Collins.  The patient is taking methimazole 2.5 mg daily.  Labs on 9/28/2022 showed a normal TSH of 1.48 and a normal free T4 of 1.19.  Thyroid uptake scan on 4/5/2021 was normal.  She is clinically euthyroid.    -Continue methimazole 2.5 mg 4/9 as managed per endocrinology     Primary hypertension  Chronic medical condition.   Ongoing and well-controlled.  The patient is taking amlodipine 5 mg daily and HCTZ 25 mg daily.  Blood pressure today's visit is 112/58.  She denies new headaches, vision changes, chest pain, shortness of breath, lower extremity edema.   -Continue HCTZ 25 mg daily and amlodipine 5 mg daily     Mixed hyperlipidemia  Chronic medical condition.  Diet " controlled.  Lipid panel from 12/6/2022 showed a total cholesterol 202, , HDL 54, triglycerides 116.   The 10-year ASCVD risk score (Tony MAGANA, et al., 2019) is: 7.3%  -Continue dietary management with a low cholesterol diet given the patient's borderline 10-year ASCVD risk score versus start a statin medication  - Lipid Profile; Future  - Comp Metabolic Panel; Future     REINALDO (generalized anxiety disorder)  Chronic medical condition.  Stable.  The patient is taking sertraline 50 mg daily and alprazolam 0.25 mg as needed for anxiety.  Review of the patient's  shows that she was last given a prescription for alprazolam 0.25 mg, dispo #10, on 3/30/2021. Obtained and reviewed patient utilization report from [facility] pharmacy database on 12/9/2022 5:23 AM  prior to writing prescription for controlled substance II, III or IV per [place] bill . Based on assessment of the report, the prescription is medically necessary.   -Continue sertraline 50 mg daily and alprazolam 0.25 mg as needed     Allergic rhinitis due to other allergic trigger, unspecified seasonality  Mild intermittent asthma without complication  Chronic medical condition.  Ongoing and well-controlled.  -Continue cetirizine 10 mg daily, fluticasone nasal spray daily, and albuterol as needed     Menopausal state  - DS-BONE DENSITY STUDY (DEXA); Future      Services suggested: No services needed at this time  Health Care Screening: Age-appropriate preventive services recommended by USPTF and ACIP covered by Medicare were discussed today. Services ordered if indicated and agreed upon by the patient.  Referrals offered: Community-based lifestyle interventions to reduce health risks and promote self-management and wellness, fall prevention, nutrition, physical activity, tobacco-use cessation, weight loss, and mental health services as per orders if indicated.    Discussion today about general wellness and lifestyle habits:    Prevent falls and  reduce trip hazards; Cautioned about securing or removing rugs.  Have a working fire alarm and carbon monoxide detector;   Engage in regular physical activity and social activities     Follow-up: Return in about 1 year (around 12/9/2023) for Annual/Wellness Visit.

## 2023-01-13 ENCOUNTER — OFFICE VISIT (OUTPATIENT)
Dept: URGENT CARE | Facility: PHYSICIAN GROUP | Age: 68
End: 2023-01-13
Payer: MEDICARE

## 2023-01-13 VITALS
HEART RATE: 70 BPM | HEIGHT: 64 IN | DIASTOLIC BLOOD PRESSURE: 80 MMHG | RESPIRATION RATE: 14 BRPM | WEIGHT: 253 LBS | BODY MASS INDEX: 43.19 KG/M2 | SYSTOLIC BLOOD PRESSURE: 128 MMHG | OXYGEN SATURATION: 96 % | TEMPERATURE: 96.9 F

## 2023-01-13 DIAGNOSIS — H92.02 LEFT EAR PAIN: ICD-10-CM

## 2023-01-13 DIAGNOSIS — H66.002 ACUTE SUPPURATIVE OTITIS MEDIA OF LEFT EAR WITHOUT SPONTANEOUS RUPTURE OF TYMPANIC MEMBRANE, RECURRENCE NOT SPECIFIED: ICD-10-CM

## 2023-01-13 PROCEDURE — 99214 OFFICE O/P EST MOD 30 MIN: CPT | Performed by: NURSE PRACTITIONER

## 2023-01-13 RX ORDER — DOXYCYCLINE HYCLATE 100 MG
100 TABLET ORAL 2 TIMES DAILY
Qty: 20 TABLET | Refills: 0 | Status: SHIPPED | OUTPATIENT
Start: 2023-01-13 | End: 2023-01-23

## 2023-01-13 ASSESSMENT — FIBROSIS 4 INDEX: FIB4 SCORE: 1.38

## 2023-01-13 NOTE — PROGRESS NOTES
"Kiley Foss is a 67 y.o. female who presents for Otalgia (Pt states there is fluid in her L ear, x 4 days)      HPI  This is a new problem. Kiley Foss is a 67 y.o. patient who presents to urgent care with c/o: 4 days of increased sinus pain, left ear pain. Crackles in her ear for 4 days. Pain in her left ear and left sinus is constant. Sharp. Coughing.  Symptoms of cold/ cough started 2 weeks ago and are waxing and waning. Ear pain is now constant and getting slowly worse.   Treatments tried: azelastine, fluticasone,   Denies fever, chills, sob, body aches, c/p, nasal drainage.    No other aggravating or alleviating factors.       ROS See HPI    Allergies:       Allergies   Allergen Reactions    Augmentin Anxiety and Unspecified     Feels like crawling on skin    Cephalexin Vomiting     Generalized sense of \"not well\" went to Emergency Room    Pcn [Penicillins] Anxiety    Sulfa Drugs Anxiety     Sulfite reaction    Diclofenac Sodium-Propylene Glycol Anxiety and Palpitations    Ibuprofen Anxiety and Palpitations    Keflex Unspecified    Penicillin G Unspecified    Latex Rash     Rash only       PMSFS Hx:  Past Medical History:   Diagnosis Date    Anxiety     Arrhythmia 11/2019    Flutter feeling, vibration, palpitation, PVC's occ, had [place] Patch in Dec 2019    Arthritis 01/21/2020    Osteo in knees    ASTHMA     Chronic pain of both knees 6/20/2018    Bilateral cortisone shots to both knees in Jan 2019    Daytime sleepiness     due to knee pain , busy mind , Naps once a day OCC    Dyslipidemia     Dysuria 5/27/2020    Edema extremities 5/30/2018     IMO 10/1 Regulatory Update    Gasping for breath     GERD (gastroesophageal reflux disease)     Heart burn 01/21/2020    Heart palpitations 8/14/2019     IMO load March 2020    Hemorrhage     Hemorrhoids 11/12/15    colonoscopy     Hypertension     Infectious disease 01/21/2020    Sick grandchild and  with flu and cold    Insomnia     trouble " going to sleep and staying asleep    Low serum vitamin D 9/19/2017    Multiple thyroid nodules 01/21/2020    enlarged thyroid with nodules, Followed by Dr. Collins    Non-seasonal allergic rhinitis     Non-smoker 9/28/2020    Obesity -- 43 7/27/2011    Pain 01/21/2020    knees    RAD (reactive airway disease) 7/1/2011    Only issues with colds, environmental    Rash 5/14/2021    Simple phobia     flying    Sinus infection 01/21/2020    currently being treated with antibiotics and steroids by Dr. Quinones    Sleep apnea 01/21/2020    Sleep study on 5/1/20    Snoring     Urinary incontinence 01/21/2020    Uses Maxi pads    Varicose vein of leg 7/10/2015     Past Surgical History:   Procedure Laterality Date    BIOPSY GENERAL Bilateral 01/08/2020    moles removed back of left arm and right shoulder    COLONOSCOPY N/A 2016    Polyps and on 5 year plan for testing    BIOPSY GENERAL      thyroid     DENTAL EXTRACTION(S) N/A 1980's    3 extracted over a period of time    SALPINGECTOMY      ectopic pregnancy    SINUSOTOMIES      TONSILLECTOMY      TUBAL COAGULATION LAPAROSCOPIC BILATERAL       Family History   Problem Relation Age of Onset    Hypertension Mother     Diabetes Mother     Cancer Father         Non Hodgekin's Lymphoma    Cancer Sister         cervical    Cancer Brother         skin    Heart Disease Maternal Grandfather     Stroke Maternal Grandfather     Heart Disease Paternal Grandfather     Stroke Paternal Grandfather      Social History     Tobacco Use    Smoking status: Never    Smokeless tobacco: Never   Substance Use Topics    Alcohol use: Yes     Comment: twice a month       Problems:   Patient Active Problem List   Diagnosis    REINALDO (generalized anxiety disorder)    Mild intermittent asthma without complication    GERD (gastroesophageal reflux disease)    Multiple thyroid nodules    Primary hypertension    Fear of flying    Morbid obesity with BMI of 40.0-44.9, adult (HCC)    Allergic rhinitis    EVE  "(obstructive sleep apnea)    Gilbert's disease    Subclinical hyperthyroidism    Microscopic hematuria    Mixed hyperlipidemia    Primary osteoarthritis of both knees    Renal cyst    Kidney stone       Medications:   Current Outpatient Medications on File Prior to Visit   Medication Sig Dispense Refill    azelastine (ASTELIN) 137 MCG/SPRAY nasal spray SPRAY 1 SPRAY IN EACH NOSTRIL TWICE DAILY      cetirizine (ZYRTEC) 10 MG Tab Zyrtec 10 mg tablet   1 tablet every day by oral route.      fluticasone (FLONASE) 50 MCG/ACT nasal spray fluticasone propionate 50 mcg/actuation nasal spray,suspension   2 sprays every day by nasal route.      hydroCHLOROthiazide (HYDRODIURIL) 25 MG Tab Take 1 Tablet by mouth every day. 90 Tablet 0    meloxicam (MOBIC) 7.5 MG Tab TAKE 1 TABLET BY MOUTH EVERY DAY 30 Tablet 2    amLODIPine (NORVASC) 5 MG Tab Take 1 Tablet by mouth every day. 90 Tablet 3    VENTOLIN  (90 Base) MCG/ACT Aero Soln inhalation aerosol Inhale 2 Puffs every 6 hours as needed for Shortness of Breath. 18 g 2    sertraline (ZOLOFT) 50 MG Tab Take 1 Tablet by mouth every day. 90 Tablet 3    methimazole (TAPAZOLE) 5 MG Tab Just 1/2 tablet 4 days a week      vitamin D (CHOLECALCIFEROL) 1000 UNIT TABS Take 1,000 Units by mouth every day.      Multiple Vitamins-Minerals (WOMENS MULTI VITAMIN & MINERAL PO) Take  by mouth every day.       No current facility-administered medications on file prior to visit.          Objective:     /80   Pulse 70   Temp 36.1 °C (96.9 °F)   Resp 14   Ht 1.626 m (5' 4\")   Wt 115 kg (253 lb)   LMP 07/26/2012   SpO2 96%   BMI 43.43 kg/m²     Physical Exam  Vitals and nursing note reviewed.   Constitutional:       Appearance: She is well-developed.   HENT:      Head: Normocephalic.      Right Ear: Hearing and tympanic membrane normal.      Left Ear: Hearing and tympanic membrane normal.   Eyes:      Conjunctiva/sclera: Conjunctivae normal.   Cardiovascular:      Rate and Rhythm: " Normal rate and regular rhythm.   Pulmonary:      Effort: Pulmonary effort is normal. No respiratory distress.      Breath sounds: Normal breath sounds.   Musculoskeletal:         General: Normal range of motion.      Cervical back: Normal range of motion and neck supple.   Lymphadenopathy:      Head:      Right side of head: No tonsillar adenopathy.      Left side of head: No tonsillar adenopathy.      Cervical: No cervical adenopathy.      Upper Body:      Right upper body: No supraclavicular adenopathy.      Left upper body: No supraclavicular adenopathy.   Skin:     General: Skin is warm and dry.   Neurological:      Mental Status: She is alert and oriented to person, place, and time.      Deep Tendon Reflexes: Reflexes are normal and symmetric.   Psychiatric:         Speech: Speech normal.         Behavior: Behavior normal.         Assessment /Associated Orders:      1. Acute suppurative otitis media of left ear without spontaneous rupture of tympanic membrane, recurrence not specified  doxycycline (VIBRAMYCIN) 100 MG Tab      2. Left ear pain            Medical Decision Making:    Pt is clinically stable at today's acute urgent care visit.  No acute distress noted. Appropriate for outpatient care at this time.   Acute problem today .   Educated in proper administration of  prescription medication(s) ordered today including safety, possible SE, risks, benefits, rationale and alternatives to therapy.   OTC antihistamine of choice. Follow manufactures dosing and safety guidelines.   Keep well hydrated   Humidifier at night prn  Warm pack to ear prn pain   OTC  analgesic of choice (acetaminophen or NSAID) prn pain. Follow manufactures dosing and safety precautions.   Resume all prior  RX medications. Take as prescribed.     Discussed Dx, management options (risks,benefits, and alternatives to planned treatment), natural progression and supportive care.  Expressed understanding and the treatment plan was agreed  upon.   Questions were encouraged and answered   Return to urgent care prn if new or worsening sx or if there is no improvement in condition prn.    Educated in Red flags and indications to immediately call 911 or present to the Emergency Department.       Time I spent evaluating Kiley Foss in urgent care today was 32  minutes. This time includes preparing for visit, reviewing any pertinent notes or test results, counseling/education, exam, obtaining HPI, interpretation of lab tests, medication management and documentation as indicated above.Time does not include separately billable procedures noted .       Please note that this dictation was created using voice recognition software. I have worked with consultants from the vendor as well as technical experts from [facility] to optimize the interface. I have made every reasonable attempt to correct obvious errors, but I expect that there are errors of grammar and possibly content that I did not discover before finalizing the note.  This note was electronically signed by provider

## 2023-01-16 RX ORDER — FLUCONAZOLE 150 MG/1
150 TABLET ORAL ONCE
Qty: 2 TABLET | Refills: 0 | Status: SHIPPED | OUTPATIENT
Start: 2023-01-16 | End: 2023-01-16

## 2023-01-19 NOTE — PROGRESS NOTES
[facility] Follow-up Visit    Date of Visit: 1/30/2023     CC:  Follow-up for EVE management      HPI:  Kiley Foss is a very pleasant 67 y.o. year old female never smoker, with a PMHx of EVE, HTN, increased BMI, GERD, REINALDO, allergic rhinitis, asthma, who presented to the Sleep [facility] for a regular follow up. Last seen in the office on 9/28/2020 with Dr. Lechuga.     Patient presents for first compliance.  Patient states that she has noticed improvement in her symptoms since starting CPAP therapy.  She also reports that it is taking her quite some time to use to using the CPAP machine.  She states that she will have occasional minor morning headaches, dry mouth, occasional mask leak.  She states her daytime drowsiness and issues falling asleep have improved.  She also reports that she has had a cold and has been trying to figure out which her mask works best for her, confused and threatening around.  She denies any significant driving drowsy, snoring, gasping, apneas, palpitation, aerophagia, skin irritation, or any symptoms of RLS, narcolepsy or any nightmares, sleep walking or acting out of dreams.  Patient goes to bed between 9-11 PM and wakes up between 7-8 AM.  She has 2-4 awakenings at night to use bathroom but does not have any issues while back asleep.  She does not nap, which she previously had to nap once a day.    DME provider: [employer]  Device: ResMed AirSense 11  Settings: AutoCPAP 5-15 CWP  When: 12/2022  Mask: Nasal pillows  Chin strap: No     Cleaning regimen: Once week with dish soap and water    Compliance:  Compliance data reviewed showing 100% usage > 4hours in last 30 days. Average AHI 5.0 events/hour. 95% pressure 11.7 CWP. 95% leaks 14.1 L/min. Patient continues to use and benefit from machine.      Sleep History:  HSS 11/18/2021-      Patient Active Problem List    Diagnosis Date Noted    EVE on CPAP 09/26/2020    Renal cyst 08/03/2022    Kidney stone 08/03/2022    Mixed  hyperlipidemia 05/14/2021    Primary osteoarthritis of both knees 05/14/2021    Subclinical hyperthyroidism 03/30/2021    Microscopic hematuria 03/30/2021    Gilbert's disease 12/01/2020    Allergic rhinitis 10/30/2018    Fear of flying 02/21/2017    Morbid obesity with BMI of 40.0-44.9, adult ([facility]) 02/21/2017    Primary hypertension 07/10/2015    REINALDO (generalized anxiety disorder)     Mild intermittent asthma without complication     GERD (gastroesophageal reflux disease)     Multiple thyroid nodules      Past Medical History:   Diagnosis Date    Anxiety     Arrhythmia 11/2019    Flutter feeling, vibration, palpitation, PVC's occ, had [place] Patch in Dec 2019    Arthritis 01/21/2020    Osteo in knees    ASTHMA     Chronic pain of both knees 6/20/2018    Bilateral cortisone shots to both knees in Jan 2019    Daytime sleepiness     due to knee pain , busy mind , Naps once a day OCC    Dyslipidemia     Dysuria 5/27/2020    Edema extremities 5/30/2018     IMO 10/1 Regulatory Update    Gasping for breath     GERD (gastroesophageal reflux disease)     Heart burn 01/21/2020    Heart palpitations 8/14/2019     IMO load March 2020    Hemorrhage     Hemorrhoids 11/12/15    colonoscopy     Hypertension     Infectious disease 01/21/2020    Sick grandchild and  with flu and cold    Insomnia     trouble going to sleep and staying asleep    Low serum vitamin D 9/19/2017    Multiple thyroid nodules 01/21/2020    enlarged thyroid with nodules, Followed by Dr. Collins    Non-seasonal allergic rhinitis     Non-smoker 9/28/2020    Obesity -- 43 7/27/2011    Pain 01/21/2020    knees    RAD (reactive airway disease) 7/1/2011    Only issues with colds, environmental    Rash 5/14/2021    Simple phobia     flying    Sinus infection 01/21/2020    currently being treated with antibiotics and steroids by Dr. Quinones    Sleep apnea 01/21/2020    Sleep study on 5/1/20    Snoring     Urinary incontinence 01/21/2020    Uses Maxi pads     Varicose vein of leg 7/10/2015      Past Surgical History:   Procedure Laterality Date    BIOPSY GENERAL Bilateral 01/08/2020    moles removed back of left arm and right shoulder    COLONOSCOPY N/A 2016    Polyps and on 5 year plan for testing    BIOPSY GENERAL      thyroid     DENTAL EXTRACTION(S) N/A 1980's    3 extracted over a period of time    SALPINGECTOMY      ectopic pregnancy    SINUSOTOMIES      TONSILLECTOMY      TUBAL COAGULATION LAPAROSCOPIC BILATERAL       Family History   Problem Relation Age of Onset    Hypertension Mother     Diabetes Mother     Cancer Father         Non Hodgekin's Lymphoma    Cancer Sister         cervical    Cancer Brother         skin    Heart Disease Maternal Grandfather     Stroke Maternal Grandfather     Heart Disease Paternal Grandfather     Stroke Paternal Grandfather      Social History     Socioeconomic History    Marital status:      Spouse name: Not on file    Number of children: 2S    Years of education: 1y college    Highest education level: Some college, no degree   Occupational History    Occupation: Retired     Employer: OTHER   Tobacco Use    Smoking status: Never    Smokeless tobacco: Never   Vaping Use    Vaping Use: Never used   Substance and Sexual Activity    Alcohol use: Yes     Comment: twice a month    Drug use: No    Sexual activity: Yes     Partners: Male     Comment: , 2 sons, retired from [employer]   Other Topics Concern    Not on file   Social History Narrative    Not on file     Social Determinants of Health     Financial Resource Strain: Low Risk     Difficulty of Paying Living Expenses: Not hard at all   Food Insecurity: No Food Insecurity    Worried About Running Out of Food in the Last Year: Never true    Ran Out of Food in the Last Year: Never true   Transportation Needs: No Transportation Needs    Lack of Transportation (Medical): No    Lack of Transportation (Non-Medical): No   Physical Activity: Inactive    Days of Exercise per  Week: 0 days    Minutes of Exercise per Session: 0 min   Stress: Stress Concern Present    Feeling of Stress : To some extent   Social Connections: Moderately Isolated    Frequency of Communication with Friends and Family: More than three times a week    Frequency of Social Gatherings with Friends and Family: Twice a week    Attends [employer]ism Services: Never    Active Member of Clubs or Organizations: No    Attends Club or Organization Meetings: Patient refused    Marital Status:    Intimate Partner Violence: Not on file   Housing Stability: Low Risk     Unable to Pay for Housing in the Last Year: No    Number of Places Lived in the Last Year: 1    Unstable Housing in the Last Year: No     Current Outpatient Medications   Medication Sig Dispense Refill    azelastine (ASTELIN) 137 MCG/SPRAY nasal spray SPRAY 1 SPRAY IN EACH NOSTRIL TWICE DAILY      cetirizine (ZYRTEC) 10 MG Tab Zyrtec 10 mg tablet   1 tablet every day by oral route.      fluticasone (FLONASE) 50 MCG/ACT nasal spray fluticasone propionate 50 mcg/actuation nasal spray,suspension   2 sprays every day by nasal route.      hydroCHLOROthiazide (HYDRODIURIL) 25 MG Tab Take 1 Tablet by mouth every day. 90 Tablet 0    meloxicam (MOBIC) 7.5 MG Tab TAKE 1 TABLET BY MOUTH EVERY DAY 30 Tablet 2    amLODIPine (NORVASC) 5 MG Tab Take 1 Tablet by mouth every day. 90 Tablet 3    VENTOLIN  (90 Base) MCG/ACT Aero Soln inhalation aerosol Inhale 2 Puffs every 6 hours as needed for Shortness of Breath. 18 g 2    sertraline (ZOLOFT) 50 MG Tab Take 1 Tablet by mouth every day. 90 Tablet 3    methimazole (TAPAZOLE) 5 MG Tab Just 1/2 tablet 4 days a week      vitamin D (CHOLECALCIFEROL) 1000 UNIT TABS Take 1,000 Units by mouth every day.      Multiple Vitamins-Minerals (WOMENS MULTI VITAMIN & MINERAL PO) Take  by mouth every day.       No current facility-administered medications for this visit.      ALLERGIES: Augmentin, Cephalexin, Pcn [penicillins], Sulfa  "drugs, Diclofenac sodium-propylene glycol, Ibuprofen, Keflex, Penicillin g, and Latex    ROS:  Constitutional: Denies fever, chills, sweats,  weight loss, fatigue  Cardiovascular: Denies chest pain, tightness, palpitations, swelling in legs/feet  Respiratory: Denies shortness of breath, cough, sputum, wheezing, painful breathing   Sleep: per HPI  Gastrointestinal: Denies  difficulty swallowing, nausea, abdominal pain, diarrhea, constipation, heartburn.  Musculoskeletal: Denies painful joints, sore muscles,       PHYSICAL EXAM:  /74 (BP Location: Right arm, Patient Position: Sitting, BP Cuff Size: Adult)   Pulse 64   Resp 16   Ht 1.626 m (5' 4\")   Wt 111 kg (245 lb)   LMP 07/26/2012   SpO2 98% Comment: room air at rest  BMI 42.05 kg/m²   Appearance: Well-nourished, well-developed, no acute distress  Eyes:  No scleral icterus , EOMI  ENMT: No redness of the oropharynx  Lung auscultation:  No wheezes rhonchi rubs or rales  Cardiac: No murmurs, rubs, or gallops; regular rhythm, normal rate; no edema  Musculoskeletal:  Grossly normal; gait and station normal; digits and nails normal  Skin:  No rashes, petechiae, cyanosis  Neurologic: without focal signs; oriented to person, time, place, and purpose; judgement intact  Psychiatric:  No depression, anxiety, agitation  Mallampati score: Class IV    Assessment and Plan:    The medical record was reviewed.    Diagnostic and titration nocturnal polysomnogram's, home sleep apnea tests, continuous nocturnal oximetry results, multiple sleep latency tests, and compliance reports reviewed.    Problem List Items Addressed This Visit       EVE on CPAP     Sleep Apnea:    The pathophysiology of sleep anea and the increased risk of cardiovascular morbidity from untreated sleep apnea is discussed in detail with the patient.  Urged to avoid supine sleep, weight gain and alcoholic beverages since all of these can worsen sleep apnea. Cautioned against drowsy driving. If feeling " sleepy while driving, pull over for a break/nap, rather than persist on the road, in the interest of own safety and that of others on the road.    Compliance download was reviewed and discussed with the patient.  She has been switching between masks to figure out what works best for her.  She has also been sick.  We will see her back in 4 weeks to ensure AHI is below 5.0 events/hour and monitor mask leak.     - Compliance was reinforced  - Recommended the patient against the use of Ozone , such as SoClean  - Clean supplies a couple times a week with dish soap and water and air dry  - Recommended the patient change out supplies as recommended for best mask fit and usage of the machine  - Advised patient to reach out via [employer] if any questions or concerns should arise.   - Equipment replacement schedule:  Mask cushion every month  Nasal pillows 2 times per month  Mask every 6 months  Head gear every 6 months  Tubing every 3 months  Ultra-fine filters 2 times per month  Foam filter every 6 months  Humidifier chamber every 6 months  Chin strap every 6 months    Has been advised to continue the current AutoCPAP 5-15 CWP, clean equipment frequently, and get new mask and supplies as allowed by insurance and DME. Recommend an earlier appointment, if significant treatment barriers develop.    The risks of untreated sleep apnea were discussed with the patient at length. Patients with sleep apnea are at increased risk of cardiovascular disease including coronary artery disease, systemic arterial hypertension, pulmonary arterial hypertension, cardiac arrythmias, and stroke. The patient was advised to avoid driving a motor vehicle when drowsy.    Positive airway pressure will favorably impact many of the adverse conditions and effects provoked by sleep apnea.          Have advised the patient to follow up with the appropriate healthcare practitioners for all other medical problems and issues.    Return in about 4 weeks  (around 2/27/2023), or if symptoms worsen or fail to improve, for compliance .    Please note portions of this record was created using voice recognition software. I have made every reasonable attempt to correct obvious errors, but I expect that there are errors of grammar and possibly content I did not discover before finalizing the note.    Time spent in record review prior to patient arrival, reviewing results, and in face-to-face encounter totaled 25 min.  __________  BLAKE Rincon  Pulmonary & Sleep Medicine  [facility]

## 2023-01-25 ENCOUNTER — PATIENT MESSAGE (OUTPATIENT)
Dept: MEDICAL GROUP | Facility: PHYSICIAN GROUP | Age: 68
End: 2023-01-25
Payer: MEDICARE

## 2023-01-25 DIAGNOSIS — L72.3 SEBACEOUS CYST OF EAR: ICD-10-CM

## 2023-01-25 DIAGNOSIS — J32.9 SINUSITIS WITH NASAL POLYPS: ICD-10-CM

## 2023-01-25 DIAGNOSIS — J33.9 SINUSITIS WITH NASAL POLYPS: ICD-10-CM

## 2023-01-25 NOTE — PATIENT COMMUNICATION
1. Caller Name: Kiley                         Call Back Number: [phone] (home)         How would the patient prefer to be contacted with a response: [employer] message    2. SPECIFIC Action To Be Taken: Referral pending, please sign.    3. Diagnosis/Clinical Reason for Request:   Sinusitis with nasal polyps Sebaceous cyst of left ear        4. Specialty & Provider Name/Lab/Imaging Location: [facility]     5. Is appointment scheduled for requested order/referral: no    Patient was informed they will receive a return phone call from the office ONLY if there are any questions before processing their request. Advised to call back if they haven't received a call from the referral department in 5 days.

## 2023-01-30 ENCOUNTER — OFFICE VISIT (OUTPATIENT)
Dept: SLEEP MEDICINE | Facility: MEDICAL CENTER | Age: 68
End: 2023-01-30
Payer: MEDICARE

## 2023-01-30 VITALS
BODY MASS INDEX: 41.83 KG/M2 | SYSTOLIC BLOOD PRESSURE: 122 MMHG | HEIGHT: 64 IN | RESPIRATION RATE: 16 BRPM | DIASTOLIC BLOOD PRESSURE: 74 MMHG | WEIGHT: 245 LBS | OXYGEN SATURATION: 98 % | HEART RATE: 64 BPM

## 2023-01-30 DIAGNOSIS — G47.33 OSA ON CPAP: ICD-10-CM

## 2023-01-30 PROCEDURE — 99213 OFFICE O/P EST LOW 20 MIN: CPT

## 2023-01-30 ASSESSMENT — FIBROSIS 4 INDEX: FIB4 SCORE: 1.38

## 2023-01-30 NOTE — PATIENT INSTRUCTIONS
Recommendations for dry mouth:  1.  Increase humidity on PAP machine  2.  Biotene toothpaste/mouthwash  3.  XyliMelts lozenges  4.  Increase fluid intake

## 2023-01-30 NOTE — ASSESSMENT & PLAN NOTE
Sleep Apnea:    The pathophysiology of sleep anea and the increased risk of cardiovascular morbidity from untreated sleep apnea is discussed in detail with the patient.  Urged to avoid supine sleep, weight gain and alcoholic beverages since all of these can worsen sleep apnea. Cautioned against drowsy driving. If feeling sleepy while driving, pull over for a break/nap, rather than persist on the road, in the interest of own safety and that of others on the road.    Compliance download was reviewed and discussed with the patient.  She has been switching between masks to figure out what works best for her.  She has also been sick.  We will see her back in 4 weeks to ensure AHI is below 5.0 events/hour and monitor mask leak.     - Compliance was reinforced  - Recommended the patient against the use of Ozone , such as SoClean  - Clean supplies a couple times a week with dish soap and water and air dry  - Recommended the patient change out supplies as recommended for best mask fit and usage of the machine  - Advised patient to reach out via [employer] if any questions or concerns should arise.   - Equipment replacement schedule:  Mask cushion every month  Nasal pillows 2 times per month  Mask every 6 months  Head gear every 6 months  Tubing every 3 months  Ultra-fine filters 2 times per month  Foam filter every 6 months  Humidifier chamber every 6 months  Chin strap every 6 months    Has been advised to continue the current AutoCPAP 5-15 CWP, clean equipment frequently, and get new mask and supplies as allowed by insurance and DME. Recommend an earlier appointment, if significant treatment barriers develop.    The risks of untreated sleep apnea were discussed with the patient at length. Patients with sleep apnea are at increased risk of cardiovascular disease including coronary artery disease, systemic arterial hypertension, pulmonary arterial hypertension, cardiac arrythmias, and stroke. The patient was advised to  avoid driving a motor vehicle when drowsy.    Positive airway pressure will favorably impact many of the adverse conditions and effects provoked by sleep apnea.

## 2023-02-07 DIAGNOSIS — I10 ESSENTIAL HYPERTENSION: ICD-10-CM

## 2023-02-07 RX ORDER — HYDROCHLOROTHIAZIDE 25 MG/1
25 TABLET ORAL
Qty: 90 TABLET | Refills: 0 | Status: SHIPPED | OUTPATIENT
Start: 2023-02-07 | End: 2023-04-25

## 2023-02-07 RX ORDER — AMLODIPINE BESYLATE 5 MG/1
5 TABLET ORAL
Qty: 90 TABLET | Refills: 3 | Status: SHIPPED | OUTPATIENT
Start: 2023-02-07 | End: 2024-01-18

## 2023-02-07 RX ORDER — MELOXICAM 7.5 MG/1
7.5 TABLET ORAL DAILY
Qty: 30 TABLET | Refills: 2 | Status: SHIPPED | OUTPATIENT
Start: 2023-02-07 | End: 2023-06-08

## 2023-02-15 ENCOUNTER — HOSPITAL ENCOUNTER (OUTPATIENT)
Dept: LAB | Facility: MEDICAL CENTER | Age: 68
End: 2023-02-15
Attending: INTERNAL MEDICINE
Payer: MEDICARE

## 2023-02-15 PROCEDURE — 84443 ASSAY THYROID STIM HORMONE: CPT

## 2023-02-15 PROCEDURE — 84439 ASSAY OF FREE THYROXINE: CPT

## 2023-02-15 PROCEDURE — 36415 COLL VENOUS BLD VENIPUNCTURE: CPT

## 2023-02-16 LAB
T4 FREE SERPL-MCNC: 1.22 NG/DL (ref 0.93–1.7)
TSH SERPL DL<=0.005 MIU/L-ACNC: 2.04 UIU/ML (ref 0.38–5.33)

## 2023-02-28 NOTE — PROGRESS NOTES
[facility] Follow-up Visit    Date of Visit: 3/2/2023     CC:  Follow-up for EVE management      HPI:  Kiley Foss is a very pleasant 67 y.o. year old female never smoker, with a PMHx of EVE, HTN, increased BMI, GERD, REINALDO, allergic rhinitis, asthma, who presented to the [facility] for a regular follow up. Last seen in the office on 1/30/2023 with myself.     Patient presents for compliance and mask leak.  Patient states that she is doing much better with her CPAP usage and sleep schedule.  She states that she has had a significant decrease in daytime drowsiness, issues falling asleep, and mask leak.  She also states she has a dry mouth, which is alleviated with water.  She denies any significant morning headaches, driving drowsy, snoring, gasping, apneas, palpitations, aerophagia, skin irritation, or any symptoms of RLS, narcolepsy or any nightmares, sleep walking or acting out of dreams.  Patient goes to bed between 9-9:30 PM and wakes up at 7 AM.  She wakens 2-3 times at night to use bathroom and will occasionally have issues falling asleep, which she usually takes for a couple of minutes.  She does not nap.  She also states that her father is recently passed away and she has had of the family trust, which has been causing anxiety and occasionally insomnia episodes.    DME provider: [facility]  Device: ResMed AirSense 11  Settings: AutoCPAP 5-15 CWP  When: 12/2022  Mask: Nasal pillows  Chin strap: No     Cleaning regimen: Once week with dish soap and water    Compliance:  Compliance data reviewed showing 100% usage > 4hours in last 30 days. Average AHI 5.3 events/hour. 95% pressure 10.9 CWP. 95% leaks 15.0 L/min. Patient continues to use and benefit from machine.       Sleep History:  HSS 11/18/2021-      Patient Active Problem List    Diagnosis Date Noted    EVE on CPAP 09/26/2020    Renal cyst 08/03/2022    Kidney stone 08/03/2022    Mixed hyperlipidemia 05/14/2021    Primary osteoarthritis  of both knees 05/14/2021    Subclinical hyperthyroidism 03/30/2021    Microscopic hematuria 03/30/2021    Gilbert's disease 12/01/2020    Allergic rhinitis 10/30/2018    Fear of flying 02/21/2017    Morbid obesity with BMI of 40.0-44.9, adult (HCC) 02/21/2017    Primary hypertension 07/10/2015    REINALDO (generalized anxiety disorder)     Mild intermittent asthma without complication     GERD (gastroesophageal reflux disease)     Multiple thyroid nodules      Past Medical History:   Diagnosis Date    Anxiety     Arrhythmia 11/2019    Flutter feeling, vibration, palpitation, PVC's occ, had [place] Patch in Dec 2019    Arthritis 01/21/2020    Osteo in knees    ASTHMA     Chronic pain of both knees 6/20/2018    Bilateral cortisone shots to both knees in Jan 2019    Daytime sleepiness     due to knee pain , busy mind , Naps once a day OCC    Dyslipidemia     Dysuria 5/27/2020    Edema extremities 5/30/2018     IMO 10/1 Regulatory Update    Gasping for breath     GERD (gastroesophageal reflux disease)     Heart burn 01/21/2020    Heart palpitations 8/14/2019     IMO load March 2020    Hemorrhage     Hemorrhoids 11/12/15    colonoscopy     Hypertension     Infectious disease 01/21/2020    Sick grandchild and  with flu and cold    Insomnia     trouble going to sleep and staying asleep    Low serum vitamin D 9/19/2017    Multiple thyroid nodules 01/21/2020    enlarged thyroid with nodules, Followed by Dr. Collins    Non-seasonal allergic rhinitis     Non-smoker 9/28/2020    Obesity -- 43 7/27/2011    Pain 01/21/2020    knees    RAD (reactive airway disease) 7/1/2011    Only issues with colds, environmental    Rash 5/14/2021    Simple phobia     flying    Sinus infection 01/21/2020    currently being treated with antibiotics and steroids by Dr. Quinones    Sleep apnea 01/21/2020    Sleep study on 5/1/20    Snoring     Urinary incontinence 01/21/2020    Uses Maxi pads    Varicose vein of leg 7/10/2015      Past Surgical History:    Procedure Laterality Date    BIOPSY GENERAL Bilateral 01/08/2020    moles removed back of left arm and right shoulder    COLONOSCOPY N/A 2016    Polyps and on 5 year plan for testing    BIOPSY GENERAL      thyroid     DENTAL EXTRACTION(S) N/A 1980's    3 extracted over a period of time    SALPINGECTOMY      ectopic pregnancy    SINUSOTOMIES      TONSILLECTOMY      TUBAL COAGULATION LAPAROSCOPIC BILATERAL       Family History   Problem Relation Age of Onset    Hypertension Mother     Diabetes Mother     Cancer Father         Non Hodgekin's Lymphoma    Cancer Sister         cervical    Cancer Brother         skin    Heart Disease Maternal Grandfather     Stroke Maternal Grandfather     Heart Disease Paternal Grandfather     Stroke Paternal Grandfather      Social History     Socioeconomic History    Marital status:      Spouse name: Not on file    Number of children: 2S    Years of education: 1y college    Highest education level: Some college, no degree   Occupational History    Occupation: Retired     Employer: OTHER   Tobacco Use    Smoking status: Never    Smokeless tobacco: Never   Vaping Use    Vaping Use: Never used   Substance and Sexual Activity    Alcohol use: Yes     Comment: twice a month    Drug use: No    Sexual activity: Yes     Partners: Male     Comment: , 2 sons, retired from [employer]   Other Topics Concern    Not on file   Social History Narrative    Not on file     Social Determinants of Health     Financial Resource Strain: Low Risk     Difficulty of Paying Living Expenses: Not hard at all   Food Insecurity: No Food Insecurity    Worried About Running Out of Food in the Last Year: Never true    Ran Out of Food in the Last Year: Never true   Transportation Needs: No Transportation Needs    Lack of Transportation (Medical): No    Lack of Transportation (Non-Medical): No   Physical Activity: Inactive    Days of Exercise per Week: 0 days    Minutes of Exercise per Session: 0 min    Stress: Stress Concern Present    Feeling of Stress : To some extent   Social Connections: Moderately Isolated    Frequency of Communication with Friends and Family: More than three times a week    Frequency of Social Gatherings with Friends and Family: Twice a week    Attends Judaism Services: Never    Active Member of Clubs or Organizations: No    Attends Club or Organization Meetings: Patient refused    Marital Status:    Intimate Partner Violence: Not on file   Housing Stability: Low Risk     Unable to Pay for Housing in the Last Year: No    Number of Places Lived in the Last Year: 1    Unstable Housing in the Last Year: No     Current Outpatient Medications   Medication Sig Dispense Refill    sertraline (ZOLOFT) 50 MG Tab Take 1 Tablet by mouth every day. 90 Tablet 3    amLODIPine (NORVASC) 5 MG Tab Take 1 Tablet by mouth every day. 90 Tablet 3    meloxicam (MOBIC) 7.5 MG Tab Take 1 Tablet by mouth every day. 30 Tablet 2    hydroCHLOROthiazide (HYDRODIURIL) 25 MG Tab Take 1 Tablet by mouth every day. 90 Tablet 0    azelastine (ASTELIN) 137 MCG/SPRAY nasal spray SPRAY 1 SPRAY IN EACH NOSTRIL TWICE DAILY      cetirizine (ZYRTEC) 10 MG Tab Zyrtec 10 mg tablet   1 tablet every day by oral route.      fluticasone (FLONASE) 50 MCG/ACT nasal spray fluticasone propionate 50 mcg/actuation nasal spray,suspension   2 sprays every day by nasal route.      VENTOLIN  (90 Base) MCG/ACT Aero Soln inhalation aerosol Inhale 2 Puffs every 6 hours as needed for Shortness of Breath. 18 g 2    methimazole (TAPAZOLE) 5 MG Tab Just 1/2 tablet 4 days a week      vitamin D (CHOLECALCIFEROL) 1000 UNIT TABS Take 1,000 Units by mouth every day.      Multiple Vitamins-Minerals (WOMENS MULTI VITAMIN & MINERAL PO) Take  by mouth every day.       No current facility-administered medications for this visit.      ALLERGIES: Augmentin, Cephalexin, Pcn [penicillins], Sulfa drugs, Diclofenac sodium-propylene glycol, Ibuprofen,  "Keflex, Penicillin g, and Latex    ROS:  Constitutional: Denies fever, chills, sweats,  weight loss, fatigue  Cardiovascular: Denies chest pain, tightness, palpitations, swelling in legs/feet  Respiratory: Denies shortness of breath, cough, sputum, wheezing, painful breathing   Sleep: per HPI  Gastrointestinal: Denies  difficulty swallowing, nausea, abdominal pain, diarrhea, constipation, heartburn.  Musculoskeletal: Denies painful joints, sore muscles,       PHYSICAL EXAM:  /68 (BP Location: Right arm, Patient Position: Sitting, BP Cuff Size: Adult)   Pulse 71   Resp 16   Ht 1.626 m (5' 4\")   Wt 113 kg (250 lb)   LMP 07/26/2012   SpO2 97% Comment: room air at rest  BMI 42.91 kg/m²   Appearance: Well-nourished, well-developed, no acute distress  Eyes:  No scleral icterus , EOMI  ENMT: No redness of the oropharynx  Lung auscultation:  No wheezes rhonchi rubs or rales  Cardiac: No murmurs, rubs, or gallops; regular rhythm, normal rate; no edema  Musculoskeletal:  Grossly normal; gait and station normal; digits and nails normal  Skin:  No rashes, petechiae, cyanosis  Neurologic: without focal signs; oriented to person, time, place, and purpose; judgement intact  Psychiatric:  No depression, anxiety, agitation  Mallampati score: Class IV    Assessment and Plan:    The medical record was reviewed.    Diagnostic and titration nocturnal polysomnogram's, home sleep apnea tests, continuous nocturnal oximetry results, multiple sleep latency tests, and compliance reports reviewed.    Problem List Items Addressed This Visit       EVE on CPAP     Sleep Apnea:    The pathophysiology of sleep anea and the increased risk of cardiovascular morbidity from untreated sleep apnea is discussed in detail with the patient.  Urged to avoid supine sleep, weight gain and alcoholic beverages since all of these can worsen sleep apnea. Cautioned against drowsy driving. If feeling sleepy while driving, pull over for a break/nap, " rather than persist on the road, in the interest of own safety and that of others on the road.    Compliance download was reviewed and discussed with the patient.     - Order placed for mask and supplies to [facility]  - Compliance was reinforced  - Recommended the patient against the use of Ozone , such as SoClean  - Clean supplies a couple times a week with dish soap and water and air dry  - Recommended the patient change out supplies as recommended for best mask fit and usage of the machine  - Advised patient to reach out via [employer] if any questions or concerns should arise.   - Equipment replacement schedule:  Mask cushion every month  Nasal pillows 2 times per month  Mask every 6 months  Head gear every 6 months  Tubing every 3 months  Ultra-fine filters 2 times per month  Foam filter every 6 months  Humidifier chamber every 6 months    Has been advised to continue the current AutoCPAP 5-15 CWP, clean equipment frequently, and get new mask and supplies as allowed by insurance and DME. Recommend an earlier appointment, if significant treatment barriers develop.    The risks of untreated sleep apnea were discussed with the patient at length. Patients with sleep apnea are at increased risk of cardiovascular disease including coronary artery disease, systemic arterial hypertension, pulmonary arterial hypertension, cardiac arrythmias, and stroke. The patient was advised to avoid driving a motor vehicle when drowsy.    Positive airway pressure will favorably impact many of the adverse conditions and effects provoked by sleep apnea.           Relevant Orders    DME Mask and Supplies     Have advised the patient to follow up with the appropriate healthcare practitioners for all other medical problems and issues.    Return in about 1 year (around 3/2/2024), or if symptoms worsen or fail to improve, for Annual compliance .    Please note portions of this record was created using voice recognition software. I have  made every reasonable attempt to correct obvious errors, but I expect that there are errors of grammar and possibly content I did not discover before finalizing the note.    Time spent in record review prior to patient arrival, reviewing results, and in face-to-face encounter totaled 27 min.  __________  BLAKE Rincon  Pulmonary & Sleep Medicine  [facility]

## 2023-03-02 ENCOUNTER — OFFICE VISIT (OUTPATIENT)
Dept: SLEEP MEDICINE | Facility: MEDICAL CENTER | Age: 68
End: 2023-03-02
Payer: MEDICARE

## 2023-03-02 VITALS
DIASTOLIC BLOOD PRESSURE: 68 MMHG | HEIGHT: 64 IN | RESPIRATION RATE: 16 BRPM | SYSTOLIC BLOOD PRESSURE: 118 MMHG | HEART RATE: 71 BPM | WEIGHT: 250 LBS | BODY MASS INDEX: 42.68 KG/M2 | OXYGEN SATURATION: 97 %

## 2023-03-02 DIAGNOSIS — G47.33 OSA ON CPAP: ICD-10-CM

## 2023-03-02 PROCEDURE — 99213 OFFICE O/P EST LOW 20 MIN: CPT

## 2023-03-02 ASSESSMENT — FIBROSIS 4 INDEX: FIB4 SCORE: 1.38

## 2023-03-02 NOTE — ASSESSMENT & PLAN NOTE
Sleep Apnea:    The pathophysiology of sleep anea and the increased risk of cardiovascular morbidity from untreated sleep apnea is discussed in detail with the patient.  Urged to avoid supine sleep, weight gain and alcoholic beverages since all of these can worsen sleep apnea. Cautioned against drowsy driving. If feeling sleepy while driving, pull over for a break/nap, rather than persist on the road, in the interest of own safety and that of others on the road.    Compliance download was reviewed and discussed with the patient.     - Order placed for mask and supplies to [facility]  - Compliance was reinforced  - Recommended the patient against the use of Ozone , such as SoClean  - Clean supplies a couple times a week with dish soap and water and air dry  - Recommended the patient change out supplies as recommended for best mask fit and usage of the machine  - Advised patient to reach out via [email or web address] if any questions or concerns should arise.   - Equipment replacement schedule:  Mask cushion every month  Nasal pillows 2 times per month  Mask every 6 months  Head gear every 6 months  Tubing every 3 months  Ultra-fine filters 2 times per month  Foam filter every 6 months  Humidifier chamber every 6 months    Has been advised to continue the current AutoCPAP 5-15 CWP, clean equipment frequently, and get new mask and supplies as allowed by insurance and DME. Recommend an earlier appointment, if significant treatment barriers develop.    The risks of untreated sleep apnea were discussed with the patient at length. Patients with sleep apnea are at increased risk of cardiovascular disease including coronary artery disease, systemic arterial hypertension, pulmonary arterial hypertension, cardiac arrythmias, and stroke. The patient was advised to avoid driving a motor vehicle when drowsy.    Positive airway pressure will favorably impact many of the adverse conditions and effects provoked by sleep apnea.

## 2023-03-19 DIAGNOSIS — R06.02 SOB (SHORTNESS OF BREATH): ICD-10-CM

## 2023-03-20 RX ORDER — ALBUTEROL SULFATE 90 UG/1
AEROSOL, METERED RESPIRATORY (INHALATION)
Qty: 18 G | Refills: 2 | Status: SHIPPED | OUTPATIENT
Start: 2023-03-20

## 2023-04-06 ENCOUNTER — HOSPITAL ENCOUNTER (OUTPATIENT)
Dept: RADIOLOGY | Facility: MEDICAL CENTER | Age: 68
End: 2023-04-06
Attending: INTERNAL MEDICINE
Payer: MEDICARE

## 2023-04-06 DIAGNOSIS — Z78.0 MENOPAUSE: ICD-10-CM

## 2023-04-06 PROCEDURE — 77080 DXA BONE DENSITY AXIAL: CPT

## 2023-04-19 ENCOUNTER — HOSPITAL ENCOUNTER (OUTPATIENT)
Dept: LAB | Facility: MEDICAL CENTER | Age: 68
End: 2023-04-19
Attending: INTERNAL MEDICINE
Payer: MEDICARE

## 2023-04-19 LAB
T4 FREE SERPL-MCNC: 1.15 NG/DL (ref 0.93–1.7)
TSH SERPL DL<=0.005 MIU/L-ACNC: 1.26 UIU/ML (ref 0.38–5.33)

## 2023-04-19 PROCEDURE — 84443 ASSAY THYROID STIM HORMONE: CPT

## 2023-04-19 PROCEDURE — 84439 ASSAY OF FREE THYROXINE: CPT

## 2023-04-19 PROCEDURE — 36415 COLL VENOUS BLD VENIPUNCTURE: CPT

## 2023-05-08 DIAGNOSIS — Z13.0 SCREENING FOR DEFICIENCY ANEMIA: ICD-10-CM

## 2023-06-22 ENCOUNTER — HOSPITAL ENCOUNTER (OUTPATIENT)
Dept: LAB | Facility: MEDICAL CENTER | Age: 68
End: 2023-06-22
Attending: INTERNAL MEDICINE
Payer: MEDICARE

## 2023-06-22 DIAGNOSIS — E78.2 MIXED HYPERLIPIDEMIA: ICD-10-CM

## 2023-06-22 DIAGNOSIS — Z13.0 SCREENING FOR DEFICIENCY ANEMIA: ICD-10-CM

## 2023-06-22 LAB
ALBUMIN SERPL BCP-MCNC: 4.3 G/DL (ref 3.2–4.9)
ALBUMIN/GLOB SERPL: 1.7 G/DL
ALP SERPL-CCNC: 82 U/L (ref 30–99)
ALT SERPL-CCNC: 22 U/L (ref 2–50)
ANION GAP SERPL CALC-SCNC: 13 MMOL/L (ref 7–16)
AST SERPL-CCNC: 16 U/L (ref 12–45)
BASOPHILS # BLD AUTO: 0.8 % (ref 0–1.8)
BASOPHILS # BLD: 0.05 K/UL (ref 0–0.12)
BILIRUB SERPL-MCNC: 1.7 MG/DL (ref 0.1–1.5)
BUN SERPL-MCNC: 21 MG/DL (ref 8–22)
CALCIUM ALBUM COR SERPL-MCNC: 9 MG/DL (ref 8.5–10.5)
CALCIUM SERPL-MCNC: 9.2 MG/DL (ref 8.5–10.5)
CHLORIDE SERPL-SCNC: 102 MMOL/L (ref 96–112)
CHOLEST SERPL-MCNC: 191 MG/DL (ref 100–199)
CO2 SERPL-SCNC: 26 MMOL/L (ref 20–33)
CREAT SERPL-MCNC: 0.77 MG/DL (ref 0.5–1.4)
EOSINOPHIL # BLD AUTO: 0.11 K/UL (ref 0–0.51)
EOSINOPHIL NFR BLD: 1.8 % (ref 0–6.9)
ERYTHROCYTE [DISTWIDTH] IN BLOOD BY AUTOMATED COUNT: 43.8 FL (ref 35.9–50)
FASTING STATUS PATIENT QL REPORTED: NORMAL
GFR SERPLBLD CREATININE-BSD FMLA CKD-EPI: 85 ML/MIN/1.73 M 2
GLOBULIN SER CALC-MCNC: 2.6 G/DL (ref 1.9–3.5)
GLUCOSE SERPL-MCNC: 86 MG/DL (ref 65–99)
HCT VFR BLD AUTO: 45.7 % (ref 37–47)
HDLC SERPL-MCNC: 47 MG/DL
HGB BLD-MCNC: 15.2 G/DL (ref 12–16)
IMM GRANULOCYTES # BLD AUTO: 0.05 K/UL (ref 0–0.11)
IMM GRANULOCYTES NFR BLD AUTO: 0.8 % (ref 0–0.9)
LDLC SERPL CALC-MCNC: 123 MG/DL
LYMPHOCYTES # BLD AUTO: 2.09 K/UL (ref 1–4.8)
LYMPHOCYTES NFR BLD: 33.5 % (ref 22–41)
MCH RBC QN AUTO: 30.4 PG (ref 27–33)
MCHC RBC AUTO-ENTMCNC: 33.3 G/DL (ref 32.2–35.5)
MCV RBC AUTO: 91.4 FL (ref 81.4–97.8)
MONOCYTES # BLD AUTO: 0.62 K/UL (ref 0–0.85)
MONOCYTES NFR BLD AUTO: 9.9 % (ref 0–13.4)
NEUTROPHILS # BLD AUTO: 3.32 K/UL (ref 1.82–7.42)
NEUTROPHILS NFR BLD: 53.2 % (ref 44–72)
NRBC # BLD AUTO: 0 K/UL
NRBC BLD-RTO: 0 /100 WBC (ref 0–0.2)
PLATELET # BLD AUTO: 214 K/UL (ref 164–446)
PMV BLD AUTO: 10.6 FL (ref 9–12.9)
POTASSIUM SERPL-SCNC: 4.1 MMOL/L (ref 3.6–5.5)
PROT SERPL-MCNC: 6.9 G/DL (ref 6–8.2)
RBC # BLD AUTO: 5 M/UL (ref 4.2–5.4)
SODIUM SERPL-SCNC: 141 MMOL/L (ref 135–145)
TRIGL SERPL-MCNC: 103 MG/DL (ref 0–149)
WBC # BLD AUTO: 6.2 K/UL (ref 4.8–10.8)

## 2023-06-22 PROCEDURE — 80061 LIPID PANEL: CPT

## 2023-06-22 PROCEDURE — 36415 COLL VENOUS BLD VENIPUNCTURE: CPT

## 2023-06-22 PROCEDURE — 85025 COMPLETE CBC W/AUTO DIFF WBC: CPT

## 2023-06-22 PROCEDURE — 80053 COMPREHEN METABOLIC PANEL: CPT

## 2023-06-28 ENCOUNTER — OFFICE VISIT (OUTPATIENT)
Dept: MEDICAL GROUP | Facility: PHYSICIAN GROUP | Age: 68
End: 2023-06-28
Payer: MEDICARE

## 2023-06-28 VITALS
DIASTOLIC BLOOD PRESSURE: 72 MMHG | BODY MASS INDEX: 44.56 KG/M2 | OXYGEN SATURATION: 96 % | HEIGHT: 64 IN | TEMPERATURE: 98.1 F | HEART RATE: 69 BPM | SYSTOLIC BLOOD PRESSURE: 122 MMHG | WEIGHT: 261 LBS

## 2023-06-28 DIAGNOSIS — E05.90 SUBCLINICAL HYPERTHYROIDISM: ICD-10-CM

## 2023-06-28 DIAGNOSIS — E78.2 MIXED HYPERLIPIDEMIA: ICD-10-CM

## 2023-06-28 DIAGNOSIS — I10 PRIMARY HYPERTENSION: ICD-10-CM

## 2023-06-28 PROCEDURE — 99214 OFFICE O/P EST MOD 30 MIN: CPT | Performed by: INTERNAL MEDICINE

## 2023-06-28 PROCEDURE — 3078F DIAST BP <80 MM HG: CPT | Performed by: INTERNAL MEDICINE

## 2023-06-28 PROCEDURE — 3074F SYST BP LT 130 MM HG: CPT | Performed by: INTERNAL MEDICINE

## 2023-06-28 RX ORDER — COVID-19 MOLECULAR TEST ASSAY
KIT MISCELLANEOUS
COMMUNITY
Start: 2023-05-10 | End: 2023-07-05

## 2023-06-28 RX ORDER — CYCLOSPORINE 0.5 MG/ML
1 EMULSION OPHTHALMIC 2 TIMES DAILY
COMMUNITY
Start: 2023-06-16

## 2023-06-28 RX ORDER — PREDNISOLONE ACETATE 10 MG/ML
SUSPENSION/ DROPS OPHTHALMIC
COMMUNITY
Start: 2023-03-31 | End: 2023-07-05

## 2023-06-28 ASSESSMENT — FIBROSIS 4 INDEX: FIB4 SCORE: 1.04

## 2023-06-28 ASSESSMENT — PATIENT HEALTH QUESTIONNAIRE - PHQ9: CLINICAL INTERPRETATION OF PHQ2 SCORE: 0

## 2023-06-28 NOTE — PROGRESS NOTES
Subjective:     CC:   Chief Complaint   Patient presents with    Lab Results         HPI:   Kiley presents today for follow-up visit to review recent lab results.  She feels well and has no acute medical complaints.  She does express interest in medication assisted weight loss.  She will discuss this with her endocrinologist.  With regard to her subclinical hyperthyroidism she is currently taking methimazole 2.5 mg every other day.  Her most recent labs on 4/19/2023 showed a normal TSH of 1.26 and a normal free T4 of 1.15.  She feels clinically euthyroid.  She will continue on her current regimen as managed by endocrinology.  Her cholesterol is managed through dietary modification.  Exercise is difficult secondary to weight and chronic knee pain.  Reviewed her most recent lipid panel on 6/22/2023 which showed a total cholesterol 191, , HDL 47, triglycerides 103.  Her 10-year ASCVD risk was borderline at 7.1%.  She will continue dietary management at this time.      Past Medical History:   Diagnosis Date    Anxiety     Arrhythmia 11/2019    Flutter feeling, vibration, palpitation, PVC's occ, had [place] Patch in Dec 2019    Arthritis 01/21/2020    Osteo in knees    ASTHMA     Chronic pain of both knees 6/20/2018    Bilateral cortisone shots to both knees in Jan 2019    Daytime sleepiness     due to knee pain , busy mind , Naps once a day OCC    Dyslipidemia     Dysuria 5/27/2020    Edema extremities 5/30/2018     IMO 10/1 Regulatory Update    Gasping for breath     GERD (gastroesophageal reflux disease)     Heart burn 01/21/2020    Heart palpitations 8/14/2019     IMO load March 2020    Hemorrhage     Hemorrhoids 11/12/15    colonoscopy     Hypertension     Infectious disease 01/21/2020    Sick grandchild and  with flu and cold    Insomnia     trouble going to sleep and staying asleep    Low serum vitamin D 9/19/2017    Multiple thyroid nodules 01/21/2020    enlarged thyroid with nodules, Followed by   [facility]    Non-seasonal allergic rhinitis     Non-smoker 9/28/2020    Obesity -- 43 7/27/2011    Pain 01/21/2020    knees    RAD (reactive airway disease) 7/1/2011    Only issues with colds, environmental    Rash 5/14/2021    Simple phobia     flying    Sinus infection 01/21/2020    currently being treated with antibiotics and steroids by Dr. Quinones    Sleep apnea 01/21/2020    Sleep study on 5/1/20    Snoring     Urinary incontinence 01/21/2020    Uses Maxi pads    Varicose vein of leg 7/10/2015       Social History     Tobacco Use    Smoking status: Never    Smokeless tobacco: Never   Vaping Use    Vaping Use: Never used   Substance Use Topics    Alcohol use: Yes     Comment: twice a month    Drug use: No       Current Outpatient Medications Ordered in Epic   Medication Sig Dispense Refill    RESTASIS 0.05 % ophthalmic emulsion Administer 1 Drop into both eyes 2 times a day.      hydroCHLOROthiazide (HYDRODIURIL) 25 MG Tab TAKE 1 TABLET BY MOUTH EVERY DAY 90 Tablet 3    VENTOLIN  (90 Base) MCG/ACT Aero Soln inhalation aerosol INHALE 2 PUFFS BY MOUTH EVERY 6 HOURS AS NEEDED FOR SHORTNESS OF BREATH 18 g 2    sertraline (ZOLOFT) 50 MG Tab Take 1 Tablet by mouth every day. 90 Tablet 3    amLODIPine (NORVASC) 5 MG Tab Take 1 Tablet by mouth every day. 90 Tablet 3    cetirizine (ZYRTEC) 10 MG Tab Zyrtec 10 mg tablet   1 tablet every day by oral route.      fluticasone (FLONASE) 50 MCG/ACT nasal spray fluticasone propionate 50 mcg/actuation nasal spray,suspension   2 sprays every day by nasal route.      methimazole (TAPAZOLE) 5 MG Tab Just 1/2 tablet every other day      vitamin D (CHOLECALCIFEROL) 1000 UNIT TABS Take 1,000 Units by mouth every day.      Multiple Vitamins-Minerals (WOMENS MULTI VITAMIN & MINERAL PO) Take  by mouth every day.      meloxicam (MOBIC) 7.5 MG Tab TAKE 1 TABLET BY MOUTH EVERY DAY 90 Tablet 3    prednisoLONE acetate (PRED FORTE) 1 % Suspension PLACE 1 DROP TO BOTH EYES THREE TIMES DAILY  "X 2 WEEKS (Patient not taking: Reported on 6/28/2023)      BINAXNOW COVID-19 AG HOME TEST Kit TEST AS DIRECTED TODAY (Patient not taking: Reported on 6/28/2023)      azelastine (ASTELIN) 137 MCG/SPRAY nasal spray SPRAY 1 SPRAY IN EACH NOSTRIL TWICE DAILY (Patient not taking: Reported on 6/28/2023)       No current Epic-ordered facility-administered medications on file.       Allergies:  Augmentin, Cephalexin, Pcn [penicillins], Sulfa drugs, Diclofenac sodium-propylene glycol, Ibuprofen, Keflex, Penicillin g, and Latex    Health Maintenance: Completed    Review of Systems:  No fevers or chills. No cough, chest pain, or shortness of breath.       Objective:       Exam:  /72   Pulse 69   Temp 36.7 °C (98.1 °F) (Temporal)   Ht 1.626 m (5' 4\")   Wt 118 kg (261 lb)   LMP 07/26/2012   SpO2 96%   BMI 44.80 kg/m²  Body mass index is 44.8 kg/m².    Gen: Alert and oriented, No apparent distress.  Lungs: Normal effort, CTA bilaterally, no wheezes, rhonchi, or rales  CV: Regular rate and rhythm. No murmurs, rubs, or gallops.  Ext: No clubbing, cyanosis, edema.      Assessment & Plan:     67 y.o. female with the following -     Subclinical hyperthyroidism  Chronic condition, controlled.  The patient is managed by endocrinology, Dr. Collins.  The patient is taking methimazole 2.5 mg daily.  Labs on 4/19/2023 showed a normal TSH of 1.26 and a normal free T41.15.  Thyroid uptake scan on 4/5/2021 was normal.  She is clinically euthyroid.    -Continue methimazole 2.5 mg 4/9 as managed per endocrinology     Primary hypertension  Chronic condition, controlled.  The patient is taking amlodipine 5 mg daily and HCTZ 25 mg daily.  Blood pressure today's visit is 122/72.  She denies new headaches, vision changes, chest pain, shortness of breath, lower extremity edema.   -Continue HCTZ 25 mg daily and amlodipine 5 mg daily     Mixed hyperlipidemia  Chronic condition, controlled.  Managed through dietary modification.  Lipid panel " on 6/22/2023 showed a total cholesterol 191, , HDL 47, triglycerides 103.  The 10-year ASCVD risk score (Tony MAGANA, et al., 2019) is: 7.1%  -Continue dietary management with a low cholesterol diet given the patient's borderline 10-year ASCVD risk score     Return in about 6 months (around 12/28/2023) for 6-month f/u visit.    Please note that this dictation was created using voice recognition software. I have made every reasonable attempt to correct obvious errors, but I expect that there are errors of grammar and possibly content that I did not discover before finalizing the note.

## 2023-07-15 NOTE — ASSESSMENT & PLAN NOTE
Few weeks ago, less than month  4pm-sitting  5:20 cooking dinne  6:40  Several times a day now  Few seconds and goes away on own  No chest pain, no nausea, no diaphoresis, no lightheaded  No changes to caffeine  
General

## 2023-07-21 ENCOUNTER — HOSPITAL ENCOUNTER (OUTPATIENT)
Dept: LAB | Facility: MEDICAL CENTER | Age: 68
End: 2023-07-21
Attending: PHYSICIAN ASSISTANT
Payer: MEDICARE

## 2023-07-21 LAB
T4 FREE SERPL-MCNC: 1.25 NG/DL (ref 0.93–1.7)
TSH SERPL DL<=0.005 MIU/L-ACNC: 1.9 UIU/ML (ref 0.38–5.33)

## 2023-07-21 PROCEDURE — 84443 ASSAY THYROID STIM HORMONE: CPT

## 2023-07-21 PROCEDURE — 84439 ASSAY OF FREE THYROXINE: CPT

## 2023-07-21 PROCEDURE — 36415 COLL VENOUS BLD VENIPUNCTURE: CPT

## 2023-10-18 DIAGNOSIS — F41.1 GAD (GENERALIZED ANXIETY DISORDER): ICD-10-CM

## 2023-10-18 RX ORDER — ALPRAZOLAM 0.25 MG/1
0.25 TABLET ORAL NIGHTLY PRN
Qty: 30 TABLET | Refills: 0 | Status: SHIPPED | OUTPATIENT
Start: 2023-10-18 | End: 2023-11-17

## 2023-11-09 ENCOUNTER — HOSPITAL ENCOUNTER (OUTPATIENT)
Dept: LAB | Facility: MEDICAL CENTER | Age: 68
End: 2023-11-09
Attending: PHYSICIAN ASSISTANT
Payer: MEDICARE

## 2023-11-09 ENCOUNTER — APPOINTMENT (OUTPATIENT)
Dept: MEDICAL GROUP | Facility: PHYSICIAN GROUP | Age: 68
End: 2023-11-09
Payer: MEDICARE

## 2023-11-09 ENCOUNTER — OFFICE VISIT (OUTPATIENT)
Dept: URGENT CARE | Facility: PHYSICIAN GROUP | Age: 68
End: 2023-11-09
Payer: MEDICARE

## 2023-11-09 VITALS
WEIGHT: 261.25 LBS | RESPIRATION RATE: 16 BRPM | HEIGHT: 64 IN | TEMPERATURE: 96.8 F | BODY MASS INDEX: 44.6 KG/M2 | HEART RATE: 67 BPM | OXYGEN SATURATION: 94 % | SYSTOLIC BLOOD PRESSURE: 124 MMHG | DIASTOLIC BLOOD PRESSURE: 84 MMHG

## 2023-11-09 DIAGNOSIS — T36.95XA ANTIBIOTIC-INDUCED YEAST INFECTION: ICD-10-CM

## 2023-11-09 DIAGNOSIS — J32.9 RECURRENT SINUSITIS: ICD-10-CM

## 2023-11-09 DIAGNOSIS — R05.9 COUGH, UNSPECIFIED TYPE: ICD-10-CM

## 2023-11-09 DIAGNOSIS — B37.9 ANTIBIOTIC-INDUCED YEAST INFECTION: ICD-10-CM

## 2023-11-09 LAB
T4 FREE SERPL-MCNC: 1.23 NG/DL (ref 0.93–1.7)
TSH SERPL DL<=0.005 MIU/L-ACNC: 1.3 UIU/ML (ref 0.38–5.33)

## 2023-11-09 PROCEDURE — 99213 OFFICE O/P EST LOW 20 MIN: CPT

## 2023-11-09 PROCEDURE — 3079F DIAST BP 80-89 MM HG: CPT

## 2023-11-09 PROCEDURE — 36415 COLL VENOUS BLD VENIPUNCTURE: CPT

## 2023-11-09 PROCEDURE — 84443 ASSAY THYROID STIM HORMONE: CPT

## 2023-11-09 PROCEDURE — 84439 ASSAY OF FREE THYROXINE: CPT

## 2023-11-09 PROCEDURE — 3074F SYST BP LT 130 MM HG: CPT

## 2023-11-09 RX ORDER — FLUCONAZOLE 150 MG/1
150 TABLET ORAL DAILY
Qty: 1 TABLET | Refills: 0 | Status: SHIPPED | OUTPATIENT
Start: 2023-11-09

## 2023-11-09 RX ORDER — BENZONATATE 100 MG/1
100 CAPSULE ORAL 3 TIMES DAILY PRN
Qty: 30 CAPSULE | Refills: 0 | Status: SHIPPED | OUTPATIENT
Start: 2023-11-09 | End: 2023-11-19

## 2023-11-09 RX ORDER — DOXYCYCLINE HYCLATE 100 MG
100 TABLET ORAL 2 TIMES DAILY
Qty: 14 TABLET | Refills: 0 | Status: SHIPPED | OUTPATIENT
Start: 2023-11-09 | End: 2023-11-16

## 2023-11-09 RX ORDER — METHYLPREDNISOLONE 4 MG/1
TABLET ORAL
Qty: 21 TABLET | Refills: 0 | Status: SHIPPED | OUTPATIENT
Start: 2023-11-09

## 2023-11-09 ASSESSMENT — ENCOUNTER SYMPTOMS
SINUS PAIN: 0
CHILLS: 0
SHORTNESS OF BREATH: 0
FEVER: 0
NAUSEA: 0
SPUTUM PRODUCTION: 1
DIARRHEA: 0
COUGH: 1
WHEEZING: 0
VOMITING: 0

## 2023-11-09 ASSESSMENT — FIBROSIS 4 INDEX: FIB4 SCORE: 1.05

## 2023-11-09 NOTE — PROGRESS NOTES
"Subjective     Kiley Foss is a 68 y.o. female who presents with Cough (Had a cold x2-3 weeks. C/o ongoing nasal drainage, clear phlegm buildup, cough, post nasal drip. Has tried using a netipot with no relief. )            Cough        Review of Systems   Respiratory:  Positive for cough.               Objective     /84 (BP Location: Right arm, Patient Position: Sitting, BP Cuff Size: Adult)   Pulse 67   Temp 36 °C (96.8 °F) (Temporal)   Resp 16   Ht 1.626 m (5' 4\")   Wt 118 kg (261 lb 3.9 oz)   LMP 07/26/2012   SpO2 94%   BMI 44.84 kg/m²      Physical Exam                        Assessment & Plan        1. Recurrent sinusitis  ***  - Referral to ENT  - doxycycline (VIBRAMYCIN) 100 MG Tab; Take 1 Tablet by mouth 2 times a day for 7 days.  Dispense: 14 Tablet; Refill: 0  - methylPREDNISolone (MEDROL DOSEPAK) 4 MG Tablet Therapy Pack; Follow schedule on package instructions.  Dispense: 21 Tablet; Refill: 0    2. Cough, unspecified type  ***  - benzonatate (TESSALON) 100 MG Cap; Take 1 Capsule by mouth 3 times a day as needed for Cough for up to 10 days.  Dispense: 30 Capsule; Refill: 0    3. Antibiotic-induced yeast infection  ***  - fluconazole (DIFLUCAN) 150 MG tablet; Take 1 Tablet by mouth every day.  Dispense: 1 Tablet; Refill: 0                  "

## 2023-11-09 NOTE — PROGRESS NOTES
"Subjective:   Kiley Foss is a 68 y.o. female who presents for cough, nasal drainage, postnasal drip x3 weeks.  Patient denies any sinus pressure or tenderness.  She denies fevers and shortness of breath.  Patient states she has a history of asthma.  She denies any wheezing and states she has not needed her albuterol treatments over the past 3 weeks.  She reports using out of [place] pot at home with no relief.  She also reports use of Flonase OTC.     Review of Systems   Constitutional:  Negative for chills and fever.   HENT:  Negative for sinus pain.    Respiratory:  Positive for cough and sputum production. Negative for shortness of breath and wheezing.    Cardiovascular:  Negative for chest pain.   Gastrointestinal:  Negative for diarrhea, nausea and vomiting.       Medications, Allergies, and current problem list reviewed today in Epic.     Objective:     /84 (BP Location: Right arm, Patient Position: Sitting, BP Cuff Size: Adult)   Pulse 67   Temp 36 °C (96.8 °F) (Temporal)   Resp 16   Ht 1.626 m (5' 4\")   Wt 118 kg (261 lb 3.9 oz)   SpO2 94%     Physical Exam  Vitals reviewed.   Constitutional:       Appearance: Normal appearance.   HENT:      Right Ear: Tympanic membrane normal.      Left Ear: Tympanic membrane normal.      Nose: Mucosal edema, congestion and rhinorrhea present.      Right Sinus: Maxillary sinus tenderness present.      Left Sinus: Maxillary sinus tenderness present.      Mouth/Throat:      Mouth: Mucous membranes are moist.      Pharynx: Oropharynx is clear. No oropharyngeal exudate.   Cardiovascular:      Pulses: Normal pulses.      Heart sounds: Normal heart sounds.   Pulmonary:      Effort: Pulmonary effort is normal. No respiratory distress.      Breath sounds: Normal breath sounds. No wheezing or rhonchi.   Neurological:      Mental Status: She is alert.   Psychiatric:         Mood and Affect: Mood normal.         Behavior: Behavior normal.         Assessment/Plan: "     Diagnosis and associated orders:     No diagnosis found.   Comments/MDM:     No fever chills or body aches.  Denies shortness of breath, chest pain, or wheezing.  Eating and drinking without vomiting or diarrhea.  Pertinent past history of sinusitis.  Vitals normal and PO2 adequate.  Nasal congestion, rhinorrhea with pharynx erythema and cervical adenopathy noted.  Purulent discharge appreciated in the posterior oropharynx.  TMs clear bilaterally without erythema or bulging.  Lungs are clear without wheezing rhonchi or rales.    Patient will be treated for a bacterial infection based on duration of symptoms and failure to improve on OTC meds and conservative measures.  Patient provided with prescription for fluconazole, as she states she has a history of yeast infections after antibiotic therapy.  Instructed to return to ER or urgent care if symptoms worsen or fail to improve.         Differential diagnosis, natural history, supportive care, and indications for immediate follow-up discussed.    Advised the patient to follow-up with the primary care physician for recheck, reevaluation, and consideration of further management.    Please note that this dictation was created using voice recognition software. I have made a reasonable attempt to correct obvious errors, but I expect that there are errors of grammar and possibly content that I did not discover before finalizing the note.    This note was electronically signed by MEGAN Arriola

## 2023-11-21 ENCOUNTER — APPOINTMENT (OUTPATIENT)
Dept: MEDICAL GROUP | Facility: PHYSICIAN GROUP | Age: 68
End: 2023-11-21
Payer: MEDICARE

## 2024-01-09 ENCOUNTER — APPOINTMENT (OUTPATIENT)
Dept: MEDICAL GROUP | Facility: PHYSICIAN GROUP | Age: 69
End: 2024-01-09
Payer: MEDICARE

## 2024-01-16 DIAGNOSIS — I10 ESSENTIAL HYPERTENSION: ICD-10-CM

## 2024-01-18 RX ORDER — AMLODIPINE BESYLATE 5 MG/1
5 TABLET ORAL
Qty: 90 TABLET | Refills: 3 | Status: SHIPPED | OUTPATIENT
Start: 2024-01-18

## 2024-02-22 ENCOUNTER — APPOINTMENT (OUTPATIENT)
Dept: MEDICAL GROUP | Facility: PHYSICIAN GROUP | Age: 69
End: 2024-02-22
Payer: MEDICARE

## 2024-02-28 ENCOUNTER — HOSPITAL ENCOUNTER (OUTPATIENT)
Dept: LAB | Facility: MEDICAL CENTER | Age: 69
End: 2024-02-28
Attending: PHYSICIAN ASSISTANT
Payer: MEDICARE

## 2024-02-28 PROCEDURE — 84439 ASSAY OF FREE THYROXINE: CPT

## 2024-02-28 PROCEDURE — 36415 COLL VENOUS BLD VENIPUNCTURE: CPT

## 2024-02-28 PROCEDURE — 84443 ASSAY THYROID STIM HORMONE: CPT

## 2024-02-29 LAB
T4 FREE SERPL-MCNC: 1.21 NG/DL (ref 0.93–1.7)
TSH SERPL DL<=0.005 MIU/L-ACNC: 0.91 UIU/ML (ref 0.38–5.33)

## 2024-03-01 NOTE — PROGRESS NOTES
[facility] Follow-up Visit    Date of Visit: 3/7/2024     CC:  Follow-up for EVE management      HPI:  Kiley Foss is a very pleasant 67 y.o. year old female never smoker, with a PMHx of EVE, HTN, increased BMI, GERD, REINALDO, allergic rhinitis, asthma, who presented to the [facility] for a regular follow up. Last seen in the office on 3/2/2023 with myself.     Patient presents for compliance.     Denies morning headaches.    Denies daytime drowsiness / driving drowsy.     Denies any issues falling asleep.      Snoring / Gasping / Apneas    Palpitations    Dry mouth    Denies any symptoms of RLS, narcolepsy or any nightmares, sleep walking or acting out of dreams.    Aerophagia    Mask leak / Skin irritation      Goes to bed:  Wakes up:  Naps (frequency and duration):  Awakenings:  Issues falling back to sleep?      DME provider: [employer]  Device: [employer] AirSense 11  Settings: AutoCPAP 5-15 CWP  When: 12/2022  Mask: Nasal pillows  Chin strap: No     Cleaning regimen: Once week with dish soap and water    Compliance:  Compliance data reviewed showing ***% usage > 4hours in last 30 *** days. Average AHI *** events/hour. 95% pressure *** CWP. 95% leaks *** L/min. Patient continues to use and benefit from machine. 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    Morbid obesity with BMI of 40.0-44.9, adult (HCC) 02/21/2017    Primary hypertension 07/10/2015    REINALDO (generalized anxiety disorder)     Mild intermittent asthma without complication     GERD (gastroesophageal reflux disease)     Multiple thyroid nodules       Past Medical History:   Diagnosis Date    Anxiety     Arrhythmia 11/2019    Flutter feeling, vibration, palpitation, PVC's occ, had [place] Patch in Dec 2019    Arthritis 01/21/2020    Osteo in knees    ASTHMA     Chronic pain of both knees 6/20/2018    Bilateral cortisone shots to both knees in Jan 2019    Daytime sleepiness     due to knee pain , busy mind , Naps once a day OCC    Dyslipidemia     Dysuria 5/27/2020    Edema extremities 5/30/2018     IMO 10/1 Regulatory Update    Gasping for breath     GERD (gastroesophageal reflux disease)     Heart burn 01/21/2020    Heart palpitations 8/14/2019     IMO load March 2020    Hemorrhage     Hemorrhoids 11/12/15    colonoscopy     Hypertension     Infectious disease 01/21/2020    Sick grandchild and  with flu and cold    Insomnia     trouble going to sleep and staying asleep    Low serum vitamin D 9/19/2017    Multiple thyroid nodules 01/21/2020    enlarged thyroid with nodules, Followed by Dr. Collins    Non-seasonal allergic rhinitis     Non-smoker 9/28/2020    Obesity -- 45 7/27/2011    Pain 01/21/2020    knees    RAD (reactive airway disease) 7/1/2011    Only issues with colds, environmental    Rash 5/14/2021    Simple phobia     flying    Sinus infection 01/21/2020    currently being treated with antibiotics and steroids by Dr. Quinones    Sleep apnea 01/21/2020    Sleep study on 5/1/20    Snoring     Urinary incontinence 01/21/2020    Uses Maxi pads    Varicose vein of leg 7/10/2015      Past Surgical History:   Procedure Laterality Date    BIOPSY GENERAL Bilateral 01/08/2020    moles removed back of left arm and right shoulder    COLONOSCOPY N/A 2016    Polyps and on 5 year plan for testing    BIOPSY GENERAL      thyroid     DENTAL EXTRACTION(S) N/A 1980's    3 extracted over a period of time    SALPINGECTOMY      ectopic pregnancy    SINUSOTOMIES      TONSILLECTOMY      TUBAL COAGULATION LAPAROSCOPIC BILATERAL       Family History   Problem Relation Age of  Onset    Hypertension Mother     Diabetes Mother     Cancer Father         Non Hodgekin's Lymphoma    Cancer Sister         cervical    Cancer Brother         skin    Heart Disease Maternal Grandfather     Stroke Maternal Grandfather     Heart Disease Paternal Grandfather     Stroke Paternal Grandfather      Social History     Socioeconomic History    Marital status:      Spouse name: Not on file    Number of children: 2S    Years of education: 1y college    Highest education level: Some college, no degree   Occupational History    Occupation: Retired     Employer: OTHER   Tobacco Use    Smoking status: Never    Smokeless tobacco: Never   Vaping Use    Vaping Use: Never used   Substance and Sexual Activity    Alcohol use: Yes     Comment: twice a month    Drug use: No    Sexual activity: Yes     Partners: Male     Comment: , 2 sons, retired from [employer]   Other Topics Concern    Not on file   Social History Narrative    Not on file     Social Determinants of Health     Financial Resource Strain: Low Risk  (12/9/2022)    Overall Financial Resource Strain (CARDIA)     Difficulty of Paying Living Expenses: Not hard at all   Food Insecurity: No Food Insecurity (12/9/2022)    Hunger Vital Sign     Worried About Running Out of Food in the Last Year: Never true     Ran Out of Food in the Last Year: Never true   Transportation Needs: No Transportation Needs (12/9/2022)    PRAPARE - Transportation     Lack of Transportation (Medical): No     Lack of Transportation (Non-Medical): No   Physical Activity: Inactive (12/9/2022)    Exercise Vital Sign     Days of Exercise per Week: 0 days     Minutes of Exercise per Session: 0 min   Stress: Stress Concern Present (12/9/2022)    [facility] - Occupational Stress Questionnaire     Feeling of Stress : To some extent   Social Connections: Moderately Isolated (12/9/2022)    Social Connection and Isolation Panel [NHANES]     Frequency of  Communication with Friends and Family: More than three times a week     Frequency of Social Gatherings with Friends and Family: Twice a week     Attends Adventism Services: Never     Active Member of Clubs or Organizations: No     Attends Club or Organization Meetings: Patient declined     Marital Status:    Intimate Partner Violence: Not on file   Housing Stability: Low Risk  (12/9/2022)    Housing Stability Vital Sign     Unable to Pay for Housing in the Last Year: No     Number of Places Lived in the Last Year: 1     Unstable Housing in the Last Year: No     Current Outpatient Medications   Medication Sig Dispense Refill    amLODIPine (NORVASC) 5 MG Tab TAKE 1 TABLET BY MOUTH EVERY DAY 90 Tablet 3    methylPREDNISolone (MEDROL DOSEPAK) 4 MG Tablet Therapy Pack Follow schedule on package instructions. 21 Tablet 0    fluconazole (DIFLUCAN) 150 MG tablet Take 1 Tablet by mouth every day. 1 Tablet 0    meloxicam (MOBIC) 7.5 MG Tab TAKE 1 TABLET BY MOUTH EVERY DAY 90 Tablet 3    RESTASIS 0.05 % ophthalmic emulsion Administer 1 Drop into both eyes 2 times a day.      hydroCHLOROthiazide (HYDRODIURIL) 25 MG Tab TAKE 1 TABLET BY MOUTH EVERY DAY 90 Tablet 3    VENTOLIN  (90 Base) MCG/ACT Aero Soln inhalation aerosol INHALE 2 PUFFS BY MOUTH EVERY 6 HOURS AS NEEDED FOR SHORTNESS OF BREATH 18 g 2    sertraline (ZOLOFT) 50 MG Tab Take 1 Tablet by mouth every day. 90 Tablet 3    cetirizine (ZYRTEC) 10 MG Tab Zyrtec 10 mg tablet   1 tablet every day by oral route.      fluticasone (FLONASE) 50 MCG/ACT nasal spray fluticasone propionate 50 mcg/actuation nasal spray,suspension   2 sprays every day by nasal route.      methimazole (TAPAZOLE) 5 MG Tab Just 1/2 tablet every other day      vitamin D (CHOLECALCIFEROL) 1000 UNIT TABS Take 1,000 Units by mouth every day.      Multiple Vitamins-Minerals (WOMENS MULTI VITAMIN & MINERAL PO) Take  by mouth every day.       No current facility-administered medications for this  visit.      ALLERGIES: Augmentin, Cephalexin, Pcn [penicillins], Sulfa drugs, Diclofenac sodium-propylene glycol, Ibuprofen, Keflex, Penicillin g, and Latex    ROS:  Constitutional: Denies fever, chills, sweats,  weight loss, fatigue  Cardiovascular: Denies chest pain, tightness, palpitations, swelling in legs/feet  Respiratory: Denies shortness of breath, cough, sputum, wheezing, painful breathing   Sleep: per HPI  Gastrointestinal: Denies  difficulty swallowing, nausea, abdominal pain, diarrhea, constipation, heartburn.  Musculoskeletal: Denies painful joints, sore muscles,       PHYSICAL EXAM:  LMP 07/26/2012   Appearance: Well-nourished, well-developed, no acute distress  Eyes:  No scleral icterus , EOMI  ENMT: No redness of the oropharynx  Lung auscultation:  No wheezes rhonchi rubs or rales  Cardiac: No murmurs, rubs, or gallops; regular rhythm, normal rate; no edema  Musculoskeletal:  Grossly normal; gait and station normal; digits and nails normal  Skin:  No rashes, petechiae, cyanosis  Neurologic: without focal signs; oriented to person, time, place, and purpose; judgement intact  Psychiatric:  No depression, anxiety, agitation  Mallampati score: Class IV    Assessment and Plan:    The medical record was reviewed.    Diagnostic and titration nocturnal polysomnogram's, home sleep apnea tests, continuous nocturnal oximetry results, multiple sleep latency tests, and compliance reports reviewed.    Problem List Items Addressed This Visit    None      Have advised the patient to follow up with the appropriate healthcare practitioners for all other medical problems and issues.    No follow-ups on file.    Please note portions of this record was created using voice recognition software. I have made every reasonable attempt to correct obvious errors, but I expect that there are errors of grammar and possibly content I did not discover before finalizing the note.    Time spent in record review prior to patient  arrival, reviewing results, and in face-to-face encounter totaled 27 min.  __________  BLAKE Rincon  Pulmonary & Sleep Medicine  [facility]

## 2024-03-07 ENCOUNTER — APPOINTMENT (OUTPATIENT)
Dept: SLEEP MEDICINE | Facility: MEDICAL CENTER | Age: 69
End: 2024-03-07
Payer: MEDICARE

## 2024-03-29 ENCOUNTER — OFFICE VISIT (OUTPATIENT)
Dept: SLEEP MEDICINE | Facility: MEDICAL CENTER | Age: 69
End: 2024-03-29
Attending: PHYSICIAN ASSISTANT
Payer: MEDICARE

## 2024-03-29 VITALS
RESPIRATION RATE: 19 BRPM | DIASTOLIC BLOOD PRESSURE: 62 MMHG | SYSTOLIC BLOOD PRESSURE: 108 MMHG | OXYGEN SATURATION: 94 % | WEIGHT: 250 LBS | HEART RATE: 75 BPM | HEIGHT: 64 IN | BODY MASS INDEX: 42.68 KG/M2

## 2024-03-29 DIAGNOSIS — G47.33 OSA ON CPAP: ICD-10-CM

## 2024-03-29 DIAGNOSIS — E66.01 MORBID OBESITY WITH BMI OF 40.0-44.9, ADULT (HCC): ICD-10-CM

## 2024-03-29 PROCEDURE — 99212 OFFICE O/P EST SF 10 MIN: CPT | Performed by: PHYSICIAN ASSISTANT

## 2024-03-29 PROCEDURE — 3078F DIAST BP <80 MM HG: CPT | Performed by: PHYSICIAN ASSISTANT

## 2024-03-29 PROCEDURE — 3074F SYST BP LT 130 MM HG: CPT | Performed by: PHYSICIAN ASSISTANT

## 2024-03-29 PROCEDURE — 99213 OFFICE O/P EST LOW 20 MIN: CPT | Performed by: PHYSICIAN ASSISTANT

## 2024-03-29 ASSESSMENT — ENCOUNTER SYMPTOMS
PALPITATIONS: 1
ROS GI COMMENTS: NO DENTURES, MISSING TWO MOLARS, NO SWALLOWING ISSUES
DIZZINESS: 0
SORE THROAT: 0
SPUTUM PRODUCTION: 0
ORTHOPNEA: 0
COUGH: 0
TREMORS: 0
SHORTNESS OF BREATH: 0
HEARTBURN: 1
SINUS PAIN: 0
FEVER: 0
WEIGHT LOSS: 0
WHEEZING: 0
CHILLS: 0
INSOMNIA: 0
HEADACHES: 0

## 2024-03-29 ASSESSMENT — FIBROSIS 4 INDEX: FIB4 SCORE: 1.05

## 2024-03-29 NOTE — PROGRESS NOTES
"Chief Complaint   Patient presents with    Apnea     Las seen 3-2-2023   Return in about 1 year (around 3/2/2024), or if symptoms worsen or fail to improve, for Annual compliance .       HPI:  Kiley Foss is a 68 y.o. year old female here today for follow-up on {diagnosis:14476}.    Past Medical History: ***    Vitals:  /62 (BP Location: Left arm, Patient Position: Sitting, BP Cuff Size: Adult)   Pulse 75   Resp 19   Ht 1.626 m (5' 4\")   Wt 113 kg (250 lb)   SpO2 94%     Recent Imaging: ***    Currently using  Resmed auto CPAP @ 5-15 cm H20 pressure; compliance reviewed for 2/28/2024 through 3/28/2024, days used 30/30, average daily usage 8 hours 4 minutes, 100% of days greater than or equal to 4 hours, mask leak at 17.8 LPM at 95th percentile, AHI 6 per hour.    Device obtained December 2022  DME provider {DME:36579}  Mask interface ***   Polysomnogram ***       Sleep schedule {schedule:30280}  Symptoms {symptoms:22789}    [place] Sleepiness Scale No data recorded   Stop Bang Score No data recorded         Review of Systems   Constitutional:  Negative for chills, fever, malaise/fatigue and weight loss.   HENT:  Positive for congestion (follows with ENT). Negative for hearing loss, nosebleeds, sinus pain, sore throat and tinnitus.    Eyes:         Presc glasses   Respiratory:  Negative for cough, sputum production, shortness of breath and wheezing.    Cardiovascular:  Positive for palpitations (occasional flutters, has occasional PVCs). Negative for chest pain, orthopnea and leg swelling.   Gastrointestinal:  Positive for heartburn (rarely).        No dentures, missing two molars, no swallowing issues    Neurological:  Negative for dizziness, tremors and headaches.   Psychiatric/Behavioral:  The patient does not have insomnia.        Past Medical History:   Diagnosis Date    Anxiety     Arrhythmia 11/2019    Flutter feeling, vibration, palpitation, PVC's occ, had [place] Patch in Dec 2019    " Arthritis 01/21/2020    Osteo in knees    ASTHMA     Chronic pain of both knees 6/20/2018    Bilateral cortisone shots to both knees in Jan 2019    Daytime sleepiness     due to knee pain , busy mind , Naps once a day [facility]    Dyslipidemia     Dysuria 5/27/2020    Edema extremities 5/30/2018     IMO 10/1 Regulatory Update    Gasping for breath     GERD (gastroesophageal reflux disease)     Heart burn 01/21/2020    Heart palpitations 8/14/2019     IMO load March 2020    Hemorrhage     Hemorrhoids 11/12/15    colonoscopy     Hypertension     Infectious disease 01/21/2020    Sick grandchild and  with flu and cold    Insomnia     trouble going to sleep and staying asleep    Low serum vitamin D 9/19/2017    Multiple thyroid nodules 01/21/2020    enlarged thyroid with nodules, Followed by Dr. Collins    Non-seasonal allergic rhinitis     Non-smoker 9/28/2020    Obesity -- 43 7/27/2011    Pain 01/21/2020    knees    RAD (reactive airway disease) 7/1/2011    Only issues with colds, environmental    Rash 5/14/2021    Simple phobia     flying    Sinus infection 01/21/2020    currently being treated with antibiotics and steroids by Dr. Quinones    Sleep apnea 01/21/2020    Sleep study on 5/1/20    Snoring     Urinary incontinence 01/21/2020    Uses Maxi pads    Varicose vein of leg 7/10/2015       Past Surgical History:   Procedure Laterality Date    BIOPSY GENERAL Bilateral 01/08/2020    moles removed back of left arm and right shoulder    COLONOSCOPY N/A 2016    Polyps and on 5 year plan for testing    BIOPSY GENERAL      thyroid     DENTAL EXTRACTION(S) N/A 1980's    3 extracted over a period of time    SALPINGECTOMY      ectopic pregnancy    SINUSOTOMIES      TONSILLECTOMY      TUBAL COAGULATION LAPAROSCOPIC BILATERAL         Family History   Problem Relation Age of Onset    Hypertension Mother     Diabetes Mother     Cancer Father         Non Hodgekin's Lymphoma    Cancer Sister         cervical    Cancer Brother          skin    Heart Disease Maternal Grandfather     Stroke Maternal Grandfather     Heart Disease Paternal Grandfather     Stroke Paternal Grandfather        Social History     Socioeconomic History    Marital status:      Spouse name: Not on file    Number of children: 2S    Years of education: 1y college    Highest education level: Some college, no degree   Occupational History    Occupation: Retired     Employer: OTHER   Tobacco Use    Smoking status: Never    Smokeless tobacco: Never   Vaping Use    Vaping Use: Never used   Substance and Sexual Activity    Alcohol use: Yes     Comment: twice a month    Drug use: No    Sexual activity: Yes     Partners: Male     Comment: , 2 sons, retired from [employer]   Other Topics Concern    Not on file   Social History Narrative    Not on file     Social Determinants of Health     Financial Resource Strain: Low Risk  (12/9/2022)    Overall Financial Resource Strain (CARDIA)     Difficulty of Paying Living Expenses: Not hard at all   Food Insecurity: No Food Insecurity (12/9/2022)    Hunger Vital Sign     Worried About Running Out of Food in the Last Year: Never true     Ran Out of Food in the Last Year: Never true   Transportation Needs: No Transportation Needs (12/9/2022)    PRAPARE - Transportation     Lack of Transportation (Medical): No     Lack of Transportation (Non-Medical): No   Physical Activity: Inactive (12/9/2022)    Exercise Vital Sign     Days of Exercise per Week: 0 days     Minutes of Exercise per Session: 0 min   Stress: Stress Concern Present (12/9/2022)    [place] [employer] - Occupational Stress Questionnaire     Feeling of Stress : To some extent   Social Connections: Moderately Isolated (12/9/2022)    Social Connection and Isolation Panel [NHANES]     Frequency of Communication with Friends and Family: More than three times a week     Frequency of Social Gatherings with Friends and Family: Twice a week     Attends  Baptism Services: Never     Active Member of Clubs or Organizations: No     Attends Club or Organization Meetings: Patient declined     Marital Status:    Intimate Partner Violence: Not on file   Housing Stability: Low Risk  (12/9/2022)    Housing Stability Vital Sign     Unable to Pay for Housing in the Last Year: No     Number of Places Lived in the Last Year: 1     Unstable Housing in the Last Year: No       Allergies as of 03/29/2024 - Reviewed 03/29/2024   Allergen Reaction Noted    Amoxicillin-pot clavulanate Unspecified 06/28/2023    Augmentin Anxiety and Unspecified 01/13/2011    Cephalexin Vomiting 09/21/2015    Pcn [penicillins] Anxiety 01/13/2011    Sulfa drugs Anxiety 12/21/2011    Diclofenac sodium-propylene glycol Anxiety and Palpitations 08/22/2021    Ibuprofen Anxiety and Palpitations 08/22/2021    Keflex Unspecified 12/17/2021    Penicillin g Unspecified 12/17/2021    Latex Rash 01/21/2020          Current medications as of today   Current Outpatient Medications   Medication Sig Dispense Refill    amLODIPine (NORVASC) 5 MG Tab TAKE 1 TABLET BY MOUTH EVERY DAY 90 Tablet 3    meloxicam (MOBIC) 7.5 MG Tab TAKE 1 TABLET BY MOUTH EVERY DAY 90 Tablet 3    RESTASIS 0.05 % ophthalmic emulsion Administer 1 Drop into both eyes 2 times a day.      hydroCHLOROthiazide (HYDRODIURIL) 25 MG Tab TAKE 1 TABLET BY MOUTH EVERY DAY 90 Tablet 3    VENTOLIN  (90 Base) MCG/ACT Aero Soln inhalation aerosol INHALE 2 PUFFS BY MOUTH EVERY 6 HOURS AS NEEDED FOR SHORTNESS OF BREATH 18 g 2    sertraline (ZOLOFT) 50 MG Tab Take 1 Tablet by mouth every day. 90 Tablet 3    cetirizine (ZYRTEC) 10 MG Tab Zyrtec 10 mg tablet   1 tablet every day by oral route.      fluticasone (FLONASE) 50 MCG/ACT nasal spray fluticasone propionate 50 mcg/actuation nasal spray,suspension   2 sprays every day by nasal route.      methimazole (TAPAZOLE) 5 MG Tab Just 1/2 tablet every other day      vitamin D (CHOLECALCIFEROL) 1000 UNIT  TABS Take 1,000 Units by mouth every day.      Multiple Vitamins-Minerals (WOMENS MULTI VITAMIN & MINERAL PO) Take  by mouth every day.      methylPREDNISolone (MEDROL DOSEPAK) 4 MG Tablet Therapy Pack Follow schedule on package instructions. 21 Tablet 0    fluconazole (DIFLUCAN) 150 MG tablet Take 1 Tablet by mouth every day. 1 Tablet 0     No current facility-administered medications for this visit.         Physical Exam:   Gen:           Alert and oriented, No apparent distress. Mood and affect appropriate, normal interaction with examiner.   Hearing:     Grossly intact.  Nose:          Normal, no lesions or deformities.  Dentition:    Good dentition.   Oropharynx:   Tongue normal, posterior pharynx without erythema or exudate.  Mallampati Classification: ***  Neck:        Supple, trachea midline, no masses.  Respiratory Effort: No intercostal retractions or use of accessory muscles.   Gait and Station: Normal.  Digits and Nails: No clubbing, cyanosis, petechiae, or nodes.   Skin:        No rashes, lesions or ulcers noted.               Ext:           No cyanosis or edema.      Immunizations:  Flu:***  Pneumovax 23:***  Prevnar 13:***  PCV 20: ***  SARS CoV2 Vaccine: ***, ***    Assessment / Plan:  1. EVE on CPAP  - DME Mask and Supplies    Reviewed compliance which is excellent, patient demonstrating continued use and benefit.  Will send updated mask and supply order to [employer].  We did review equipment cleaning today, patient was reminded to use distilled water only on the humidifier chamber, reviewed equipment replacement schedule.  Patient advised to follow-up in 1 year for annual compliance.    Follow-up:   Return in about 1 year (around 3/29/2025) for Return with Cecile Garcia PA-C.    Please note that this dictation was created using voice recognition software. I have made every reasonable attempt to correct obvious errors, but it is possible there are errors of grammar and possibly content that I did  not discover before finalizing the note.

## 2024-03-29 NOTE — PATIENT INSTRUCTIONS
1-reviewed compliance which is excellent, demonstrating use and benefit   2-Today we reviewed equipment cleaning  once weekly minimum  mask, tubing and water chamber  use dedicated container  use mild soap and water  SoClean or other ozone  are not recommended  white vinegar and water solution is no longer recommended  hang tubing to dry  mask sanitizing wipes are an option for use   3-As a reminder use distilled water only in humidifier chamber.    4-Equipment replacement schedule : Nasal pillows 2 times per month, Head gear every 6 months, Tubing every 3 months, Ultra-fine filters 2 times per month, Humidifier chamber every 6 months    5-follow up in one year, sooner if needed

## 2024-04-03 ENCOUNTER — APPOINTMENT (RX ONLY)
Dept: URBAN - METROPOLITAN AREA CLINIC 22 | Facility: CLINIC | Age: 69
Setting detail: DERMATOLOGY
End: 2024-04-03

## 2024-04-03 DIAGNOSIS — L91.8 OTHER HYPERTROPHIC DISORDERS OF THE SKIN: ICD-10-CM

## 2024-04-03 DIAGNOSIS — Z87.2 PERSONAL HISTORY OF DISEASES OF THE SKIN AND SUBCUTANEOUS TISSUE: ICD-10-CM

## 2024-04-03 DIAGNOSIS — D22 MELANOCYTIC NEVI: ICD-10-CM

## 2024-04-03 DIAGNOSIS — L82.1 OTHER SEBORRHEIC KERATOSIS: ICD-10-CM

## 2024-04-03 DIAGNOSIS — D18.0 HEMANGIOMA: ICD-10-CM

## 2024-04-03 DIAGNOSIS — L81.4 OTHER MELANIN HYPERPIGMENTATION: ICD-10-CM

## 2024-04-03 DIAGNOSIS — L57.0 ACTINIC KERATOSIS: ICD-10-CM

## 2024-04-03 DIAGNOSIS — Z71.89 OTHER SPECIFIED COUNSELING: ICD-10-CM

## 2024-04-03 PROBLEM — D18.01 HEMANGIOMA OF SKIN AND SUBCUTANEOUS TISSUE: Status: ACTIVE | Noted: 2024-04-03

## 2024-04-03 PROBLEM — D22.5 MELANOCYTIC NEVI OF TRUNK: Status: ACTIVE | Noted: 2024-04-03

## 2024-04-03 PROCEDURE — 17000 DESTRUCT PREMALG LESION: CPT

## 2024-04-03 PROCEDURE — ? SUNSCREEN RECOMMENDATIONS

## 2024-04-03 PROCEDURE — ? LIQUID NITROGEN

## 2024-04-03 PROCEDURE — 99213 OFFICE O/P EST LOW 20 MIN: CPT | Mod: 25

## 2024-04-03 PROCEDURE — ? COUNSELING

## 2024-04-03 PROCEDURE — 17003 DESTRUCT PREMALG LES 2-14: CPT

## 2024-04-03 ASSESSMENT — LOCATION ZONE DERM
LOCATION ZONE: LEG
LOCATION ZONE: FACE
LOCATION ZONE: ARM
LOCATION ZONE: TRUNK

## 2024-04-03 ASSESSMENT — LOCATION DETAILED DESCRIPTION DERM
LOCATION DETAILED: LEFT POPLITEAL SKIN
LOCATION DETAILED: RIGHT LATERAL SUPERIOR CHEST
LOCATION DETAILED: LEFT INFERIOR UPPER BACK
LOCATION DETAILED: RIGHT LATERAL ZYGOMA
LOCATION DETAILED: LEFT PROXIMAL DORSAL FOREARM
LOCATION DETAILED: RIGHT MID-UPPER BACK
LOCATION DETAILED: LEFT PROXIMAL POSTERIOR UPPER ARM
LOCATION DETAILED: RIGHT INFRAMAMMARY CREASE (OUTER QUADRANT)
LOCATION DETAILED: LEFT LATERAL ABDOMEN

## 2024-04-03 ASSESSMENT — LOCATION SIMPLE DESCRIPTION DERM
LOCATION SIMPLE: RIGHT UPPER BACK
LOCATION SIMPLE: ABDOMEN
LOCATION SIMPLE: LEFT POSTERIOR UPPER ARM
LOCATION SIMPLE: CHEST
LOCATION SIMPLE: RIGHT BREAST
LOCATION SIMPLE: LEFT UPPER BACK
LOCATION SIMPLE: RIGHT ZYGOMA
LOCATION SIMPLE: LEFT POPLITEAL SKIN
LOCATION SIMPLE: LEFT FOREARM

## 2024-04-06 DIAGNOSIS — I10 ESSENTIAL HYPERTENSION: ICD-10-CM

## 2024-04-08 RX ORDER — HYDROCHLOROTHIAZIDE 25 MG/1
25 TABLET ORAL
Qty: 90 TABLET | Refills: 0 | Status: SHIPPED | OUTPATIENT
Start: 2024-04-08

## 2024-04-08 NOTE — TELEPHONE ENCOUNTER
Received request via: Pharmacy    Was the patient seen in the last year in this department? Yes    Does the patient have an active prescription (recently filled or refills available) for medication(s) requested? No    [facility]    Does the patient have detention Plus and need 100 day supply (blood pressure, diabetes and cholesterol meds only)? Patient does not have SCP

## 2024-04-19 ENCOUNTER — APPOINTMENT (OUTPATIENT)
Dept: MEDICAL GROUP | Facility: PHYSICIAN GROUP | Age: 69
End: 2024-04-19
Payer: MEDICARE

## 2024-04-19 VITALS
RESPIRATION RATE: 20 BRPM | HEIGHT: 64 IN | OXYGEN SATURATION: 97 % | SYSTOLIC BLOOD PRESSURE: 130 MMHG | DIASTOLIC BLOOD PRESSURE: 78 MMHG | WEIGHT: 263 LBS | BODY MASS INDEX: 44.9 KG/M2 | HEART RATE: 75 BPM | TEMPERATURE: 97.5 F

## 2024-04-19 DIAGNOSIS — N60.12 FIBROCYSTIC BREAST CHANGES OF BOTH BREASTS: ICD-10-CM

## 2024-04-19 DIAGNOSIS — E55.9 VITAMIN D DEFICIENCY: ICD-10-CM

## 2024-04-19 DIAGNOSIS — N39.492 POSTURAL URINARY INCONTINENCE: ICD-10-CM

## 2024-04-19 DIAGNOSIS — Z12.31 ENCOUNTER FOR SCREENING MAMMOGRAM FOR MALIGNANT NEOPLASM OF BREAST: ICD-10-CM

## 2024-04-19 DIAGNOSIS — E78.2 MIXED HYPERLIPIDEMIA: ICD-10-CM

## 2024-04-19 DIAGNOSIS — N60.11 FIBROCYSTIC BREAST CHANGES OF BOTH BREASTS: ICD-10-CM

## 2024-04-19 DIAGNOSIS — I10 PRIMARY HYPERTENSION: ICD-10-CM

## 2024-04-19 PROCEDURE — 99214 OFFICE O/P EST MOD 30 MIN: CPT | Performed by: FAMILY MEDICINE

## 2024-04-19 PROCEDURE — 3075F SYST BP GE 130 - 139MM HG: CPT | Performed by: FAMILY MEDICINE

## 2024-04-19 PROCEDURE — 3078F DIAST BP <80 MM HG: CPT | Performed by: FAMILY MEDICINE

## 2024-04-19 RX ORDER — ALPRAZOLAM 0.25 MG/1
TABLET ORAL
COMMUNITY

## 2024-04-19 RX ORDER — AZELASTINE 1 MG/ML
SPRAY, METERED NASAL
COMMUNITY

## 2024-04-19 ASSESSMENT — PATIENT HEALTH QUESTIONNAIRE - PHQ9: CLINICAL INTERPRETATION OF PHQ2 SCORE: 0

## 2024-04-19 ASSESSMENT — FIBROSIS 4 INDEX: FIB4 SCORE: 1.05

## 2024-04-19 NOTE — PROGRESS NOTES
Subjective:     CC:   Chief Complaint   Patient presents with    Medication Refill    Requesting Labs       HPI:   Kiley presents today for request of getting some lab work done also she would like her mammogram ordered.  Patient is doing her self breast exams has not felt anything of concern patient in the past was told that she has very dense breast she also wants a 3D breast ultrasound done.  Patient does see Dr. Collins for her hyper thyroidism and did have a biopsy done recently of her thyroid that was benign.  Patient does see sleep clinic and recently has seen them she does not need to follow-up with them until 2025.  Patient does see urology for her urination issues.  Patient's noticed that sometimes usually in the morning when she stands up she does have urine that comes out.  Patient did not know if it was related to her water pill or not.  Patient did talk to urology about that they recommend her doing Kegel exercises she continues having problems to follow-up with them.  Patient states that if she takes her water pill later during the day she does not have that happening.  I did mention to patient 1 option is to stop the water pill and have her come back in a couple weeks because we may need to increase her other blood pressure pill but at this time patient would like to wait and follow-up with Dr. Maciel to discuss this further.    Past Medical History:   Diagnosis Date    Anxiety     Arrhythmia 11/2019    Flutter feeling, vibration, palpitation, PVC's occ, had [place] Patch in Dec 2019    Arthritis 01/21/2020    Osteo in knees    ASTHMA     Chronic pain of both knees 6/20/2018    Bilateral cortisone shots to both knees in Jan 2019    Daytime sleepiness     due to knee pain , busy mind , Naps once a day [facility]    Dyslipidemia     Dysuria 5/27/2020    Edema extremities 5/30/2018     IMO 10/1 Regulatory Update    Gasping for breath     GERD (gastroesophageal reflux disease)     Heart burn 01/21/2020    Heart  palpitations 8/14/2019     IMO load March 2020    Hemorrhage     Hemorrhoids 11/12/15    colonoscopy     Hypertension     Infectious disease 01/21/2020    Sick grandchild and  with flu and cold    Insomnia     trouble going to sleep and staying asleep    Low serum vitamin D 9/19/2017    Multiple thyroid nodules 01/21/2020    enlarged thyroid with nodules, Followed by Dr. Collins    Non-seasonal allergic rhinitis     Non-smoker 9/28/2020    Obesity -- 43 7/27/2011    Pain 01/21/2020    knees    RAD (reactive airway disease) 7/1/2011    Only issues with colds, environmental    Rash 5/14/2021    Simple phobia     flying    Sinus infection 01/21/2020    currently being treated with antibiotics and steroids by Dr. Quinones    Sleep apnea 01/21/2020    Sleep study on 5/1/20    Snoring     Urinary incontinence 01/21/2020    Uses Maxi pads    Varicose vein of leg 7/10/2015       Social History     Tobacco Use    Smoking status: Never    Smokeless tobacco: Never   Vaping Use    Vaping Use: Never used   Substance Use Topics    Alcohol use: Yes     Comment: twice a month    Drug use: No       Current Outpatient Medications Ordered in Epic   Medication Sig Dispense Refill    ALPRAZolam (XANAX) 0.25 MG Tab       azelastine (ASTELIN) 137 MCG/SPRAY nasal spray 2 sprays in each nostril Nasally Twice a day for 30 day(s)      sertraline (ZOLOFT) 50 MG Tab TAKE 1 TABLET BY MOUTH EVERY DAY 90 Tablet 0    hydroCHLOROthiazide 25 MG Tab TAKE 1 TABLET BY MOUTH EVERY DAY 90 Tablet 0    amLODIPine (NORVASC) 5 MG Tab TAKE 1 TABLET BY MOUTH EVERY DAY 90 Tablet 3    meloxicam (MOBIC) 7.5 MG Tab TAKE 1 TABLET BY MOUTH EVERY DAY 90 Tablet 3    RESTASIS 0.05 % ophthalmic emulsion Administer 1 Drop into both eyes 2 times a day.      VENTOLIN  (90 Base) MCG/ACT Aero Soln inhalation aerosol INHALE 2 PUFFS BY MOUTH EVERY 6 HOURS AS NEEDED FOR SHORTNESS OF BREATH 18 g 2    cetirizine (ZYRTEC) 10 MG Tab Zyrtec 10 mg tablet   1 tablet every  "day by oral route.      fluticasone (FLONASE) 50 MCG/ACT nasal spray fluticasone propionate 50 mcg/actuation nasal spray,suspension   2 sprays every day by nasal route.      methimazole (TAPAZOLE) 5 MG Tab Just 1/2 tablet every other day      vitamin D (CHOLECALCIFEROL) 1000 UNIT TABS Take 1,000 Units by mouth every day.      Multiple Vitamins-Minerals (WOMENS MULTI VITAMIN & MINERAL PO) Take  by mouth every day.       No current [facility]-ordered facility-administered medications on file.       Allergies:  Amoxicillin-pot clavulanate, Augmentin, Cephalexin, Pcn [penicillins], Sulfa drugs, Diclofenac sodium-propylene glycol, Ibuprofen, Keflex, Penicillin g, and Latex    Health Maintenance: Completed    ROS:  Gen: no fevers/chills, looks like patient has gained 13 pounds in the year  Eyes: no changes in vision  ENT: no sore throat, no hearing loss, no bloody nose  Pulm: no sob, no cough  CV: no chest pain, no palpitations  GI: no nausea/vomiting, no diarrhea  Neuro: no headaches, no numbness/tingling  Heme/Lymph: no easy bruising    Objective:     Exam:  /78   Pulse 75   Temp 36.4 °C (97.5 °F) (Temporal)   Resp 20   Ht 1.626 m (5' 4\")   Wt 119 kg (263 lb)   LMP 07/26/2012   SpO2 97%   BMI 45.14 kg/m²  Body mass index is 45.14 kg/m².    Gen: Alert and oriented, No apparent distress.  Skin: Warm and dry.  No obvious lesions.  Eyes: Sclera wnl Pupils normal in size  Lungs: Normal effort, CTA bilaterally, no wheezes, rhonchi, or rales  CV: Regular rate and rhythm. No murmurs, rubs, or gallops.  Musculoskeletal: Normal gait. No extremity cyanosis, clubbing, or edema.  Neuro: Oriented to person, place and time  Psych: Mood is wnl         Assessment & Plan:     68 y.o. female with the following -     1. Primary hypertension  Blood pressure looks at goal we will go ahead and preorder her labs so she can follow-up with Dr. Maciel and discuss results  - CBC WITH DIFFERENTIAL; Future    2. Mixed hyperlipidemia  Labs " were ordered  - Comp Metabolic Panel; Future  - Lipid Profile; Future    3. Vitamin D deficiency  Labs were ordered  - VITAMIN D,25 HYDROXY (DEFICIENCY); Future    4. Encounter for screening mammogram for malignant neoplasm of breast  Mammogram was ordered patient denies feeling anything of concern  - MA-SCREENING MAMMO BILAT W/TOMOSYNTHESIS W/O CAD; Future    5. Fibrocystic breast changes of both breasts  Patient has been told in the past that she needs to get a ultrasound of both breast due to fibrocystic areas I will go ahead and order  - US-SCREENING WHOLE BREAST BILATERAL (3D SCREENING); Future    6. Postural urinary incontinence  Patient will try the Kegel exercises as requested by urology I did recommend she may need to follow-up with urology if she continues having problems.  She may also want to discuss this with Dr. Maciel if she thinks is related to her diuretic.    Return in about 6 weeks (around 5/31/2024), or if symptoms worsen or fail to improve.    Please note that this dictation was created using voice recognition software. I have made every reasonable attempt to correct obvious errors, but I expect that there are errors of grammar and possibly content that I did not discover before finalizing the note.

## 2024-06-11 ENCOUNTER — HOSPITAL ENCOUNTER (OUTPATIENT)
Dept: LAB | Facility: MEDICAL CENTER | Age: 69
End: 2024-06-11
Attending: STUDENT IN AN ORGANIZED HEALTH CARE EDUCATION/TRAINING PROGRAM
Payer: MEDICARE

## 2024-06-11 ENCOUNTER — HOSPITAL ENCOUNTER (OUTPATIENT)
Dept: LAB | Facility: MEDICAL CENTER | Age: 69
End: 2024-06-11
Attending: FAMILY MEDICINE
Payer: MEDICARE

## 2024-06-11 DIAGNOSIS — E78.2 MIXED HYPERLIPIDEMIA: ICD-10-CM

## 2024-06-11 DIAGNOSIS — E55.9 VITAMIN D DEFICIENCY: ICD-10-CM

## 2024-06-11 DIAGNOSIS — I10 PRIMARY HYPERTENSION: ICD-10-CM

## 2024-06-11 LAB
25(OH)D3 SERPL-MCNC: 31 NG/ML (ref 30–100)
ALBUMIN SERPL BCP-MCNC: 4.2 G/DL (ref 3.2–4.9)
ALBUMIN/GLOB SERPL: 1.5 G/DL
ALP SERPL-CCNC: 78 U/L (ref 30–99)
ALT SERPL-CCNC: 18 U/L (ref 2–50)
ANION GAP SERPL CALC-SCNC: 13 MMOL/L (ref 7–16)
AST SERPL-CCNC: 22 U/L (ref 12–45)
BASOPHILS # BLD AUTO: 0.7 % (ref 0–1.8)
BASOPHILS # BLD: 0.04 K/UL (ref 0–0.12)
BILIRUB SERPL-MCNC: 2 MG/DL (ref 0.1–1.5)
BUN SERPL-MCNC: 18 MG/DL (ref 8–22)
CALCIUM ALBUM COR SERPL-MCNC: 9.4 MG/DL (ref 8.5–10.5)
CALCIUM SERPL-MCNC: 9.6 MG/DL (ref 8.5–10.5)
CHLORIDE SERPL-SCNC: 105 MMOL/L (ref 96–112)
CHOLEST SERPL-MCNC: 170 MG/DL (ref 100–199)
CO2 SERPL-SCNC: 25 MMOL/L (ref 20–33)
CREAT SERPL-MCNC: 0.7 MG/DL (ref 0.5–1.4)
EOSINOPHIL # BLD AUTO: 0.13 K/UL (ref 0–0.51)
EOSINOPHIL NFR BLD: 2.2 % (ref 0–6.9)
ERYTHROCYTE [DISTWIDTH] IN BLOOD BY AUTOMATED COUNT: 45.5 FL (ref 35.9–50)
FASTING STATUS PATIENT QL REPORTED: NORMAL
GFR SERPLBLD CREATININE-BSD FMLA CKD-EPI: 95 ML/MIN/1.73 M 2
GLOBULIN SER CALC-MCNC: 2.8 G/DL (ref 1.9–3.5)
GLUCOSE SERPL-MCNC: 81 MG/DL (ref 65–99)
HCT VFR BLD AUTO: 47.8 % (ref 37–47)
HDLC SERPL-MCNC: 50 MG/DL
HGB BLD-MCNC: 15.7 G/DL (ref 12–16)
IMM GRANULOCYTES # BLD AUTO: 0.03 K/UL (ref 0–0.11)
IMM GRANULOCYTES NFR BLD AUTO: 0.5 % (ref 0–0.9)
LDLC SERPL CALC-MCNC: 102 MG/DL
LYMPHOCYTES # BLD AUTO: 2.06 K/UL (ref 1–4.8)
LYMPHOCYTES NFR BLD: 34.9 % (ref 22–41)
MCH RBC QN AUTO: 30.4 PG (ref 27–33)
MCHC RBC AUTO-ENTMCNC: 32.8 G/DL (ref 32.2–35.5)
MCV RBC AUTO: 92.6 FL (ref 81.4–97.8)
MONOCYTES # BLD AUTO: 0.55 K/UL (ref 0–0.85)
MONOCYTES NFR BLD AUTO: 9.3 % (ref 0–13.4)
NEUTROPHILS # BLD AUTO: 3.1 K/UL (ref 1.82–7.42)
NEUTROPHILS NFR BLD: 52.4 % (ref 44–72)
NRBC # BLD AUTO: 0 K/UL
NRBC BLD-RTO: 0 /100 WBC (ref 0–0.2)
PLATELET # BLD AUTO: 222 K/UL (ref 164–446)
PMV BLD AUTO: 11 FL (ref 9–12.9)
POTASSIUM SERPL-SCNC: 3.7 MMOL/L (ref 3.6–5.5)
PROT SERPL-MCNC: 7 G/DL (ref 6–8.2)
RBC # BLD AUTO: 5.16 M/UL (ref 4.2–5.4)
SODIUM SERPL-SCNC: 143 MMOL/L (ref 135–145)
T3FREE SERPL-MCNC: 3.07 PG/ML (ref 2–4.4)
T4 FREE SERPL-MCNC: 1.28 NG/DL (ref 0.93–1.7)
TRIGL SERPL-MCNC: 91 MG/DL (ref 0–149)
TSH SERPL DL<=0.005 MIU/L-ACNC: 0.85 UIU/ML (ref 0.38–5.33)
WBC # BLD AUTO: 5.9 K/UL (ref 4.8–10.8)

## 2024-06-11 PROCEDURE — 82306 VITAMIN D 25 HYDROXY: CPT

## 2024-06-11 PROCEDURE — 80061 LIPID PANEL: CPT

## 2024-06-11 PROCEDURE — 84481 FREE ASSAY (FT-3): CPT

## 2024-06-11 PROCEDURE — 84439 ASSAY OF FREE THYROXINE: CPT

## 2024-06-11 PROCEDURE — 85025 COMPLETE CBC W/AUTO DIFF WBC: CPT

## 2024-06-11 PROCEDURE — 84443 ASSAY THYROID STIM HORMONE: CPT

## 2024-06-11 PROCEDURE — 80053 COMPREHEN METABOLIC PANEL: CPT

## 2024-06-11 PROCEDURE — 36415 COLL VENOUS BLD VENIPUNCTURE: CPT

## 2024-06-14 NOTE — PROGRESS NOTES
Verbal consent was acquired by the patient to use [employer] ambient listening note generation during this visit Yes     Subjective:     CC: No chief complaint on file.        HPI:   Kiley Foss is a 68-year-old female here for follow-up visit.    Kiley Foss is a 68-year-old female here for annual wellness visit.    History of Present Illness            Past Medical History:   Diagnosis Date    Anxiety     Arrhythmia 11/2019    Flutter feeling, vibration, palpitation, PVC's occ, had William Patch in Dec 2019    Arthritis 01/21/2020    Osteo in knees    ASTHMA     Chronic pain of both knees 6/20/2018    Bilateral cortisone shots to both knees in Jan 2019    Daytime sleepiness     due to knee pain , busy mind , Naps once a day [facility]    Dyslipidemia     Dysuria 5/27/2020    Edema extremities 5/30/2018     IMO 10/1 Regulatory Update    Gasping for breath     GERD (gastroesophageal reflux disease)     Heart burn 01/21/2020    Heart palpitations 8/14/2019     IMO load March 2020    Hemorrhage     Hemorrhoids 11/12/15    colonoscopy     Hypertension     Infectious disease 01/21/2020    Sick grandchild and  with flu and cold    Insomnia     trouble going to sleep and staying asleep    Low serum vitamin D 9/19/2017    Multiple thyroid nodules 01/21/2020    enlarged thyroid with nodules, Followed by Dr. Collins    Non-seasonal allergic rhinitis     Non-smoker 9/28/2020    Obesity -- 43 7/27/2011    Pain 01/21/2020    knees    RAD (reactive airway disease) 7/1/2011    Only issues with colds, environmental    Rash 5/14/2021    Simple phobia     flying    Sinus infection 01/21/2020    currently being treated with antibiotics and steroids by Dr. Quinones    Sleep apnea 01/21/2020    Sleep study on 5/1/20    Snoring     Urinary incontinence 01/21/2020    Uses Maxi pads    Varicose vein of leg 7/10/2015       Social History     Tobacco Use    Smoking status: Never    Smokeless tobacco: Never   Vaping Use     Vaping status: Never Used   Substance Use Topics    Alcohol use: Yes     Comment: twice a month    Drug use: No       Current Outpatient Medications Ordered in Epic   Medication Sig Dispense Refill    ALPRAZolam (XANAX) 0.25 MG Tab       azelastine (ASTELIN) 137 MCG/SPRAY nasal spray 2 sprays in each nostril Nasally Twice a day for 30 day(s)      sertraline (ZOLOFT) 50 MG Tab TAKE 1 TABLET BY MOUTH EVERY DAY 90 Tablet 0    hydroCHLOROthiazide 25 MG Tab TAKE 1 TABLET BY MOUTH EVERY DAY 90 Tablet 0    amLODIPine (NORVASC) 5 MG Tab TAKE 1 TABLET BY MOUTH EVERY DAY 90 Tablet 3    meloxicam (MOBIC) 7.5 MG Tab TAKE 1 TABLET BY MOUTH EVERY DAY 90 Tablet 3    RESTASIS 0.05 % ophthalmic emulsion Administer 1 Drop into both eyes 2 times a day.      VENTOLIN  (90 Base) MCG/ACT Aero Soln inhalation aerosol INHALE 2 PUFFS BY MOUTH EVERY 6 HOURS AS NEEDED FOR SHORTNESS OF BREATH 18 g 2    cetirizine (ZYRTEC) 10 MG Tab Zyrtec 10 mg tablet   1 tablet every day by oral route.      fluticasone (FLONASE) 50 MCG/ACT nasal spray fluticasone propionate 50 mcg/actuation nasal spray,suspension   2 sprays every day by nasal route.      methimazole (TAPAZOLE) 5 MG Tab Just 1/2 tablet every other day      vitamin D (CHOLECALCIFEROL) 1000 UNIT TABS Take 1,000 Units by mouth every day.      Multiple Vitamins-Minerals (WOMENS MULTI VITAMIN & MINERAL PO) Take  by mouth every day.       No current [facility]-ordered facility-administered medications on file.       Allergies:  Amoxicillin-pot clavulanate, Augmentin, Cephalexin, Pcn [penicillins], Sulfa drugs, Diclofenac sodium-propylene glycol, Ibuprofen, Keflex, Penicillin g, and Latex    Health Maintenance: Completed    Review of Systems:  No fevers or chills. No cough, chest pain, or shortness of breath.     Review of Systems:  Constitutional: Negative for fever, chills.  Respiratory: Negative for cough and shortness of breath.    Cardiovascular: Negative for chest pain or  palpitations.  Gastrointestinal: Negative for abdominal pain, nausea, vomiting, and diarrhea.   Neurological: Negative for headaches, numbness, or tingling.  Psychiatric: Negative for anxiety or depression.      Objective:       Exam:  LMP 07/26/2012  There is no height or weight on file to calculate BMI.    Constitutional: Well-developed, no acute distress.  Lungs: Clear to auscultation bilaterally. No wheezes, ronchi, or rales.   Cardiovascular: Regular rate and rhythm, no murmurs noted.   Extremities: No edema or erythema.    Constitutional: Well-developed, no acute distress.  HEENT: Pupils are equal, round, and reactive to light. Oropharynx is without erythema, edema or exudates.   Neck: No thyromegaly or palpable thyroid nodules. No cervical or supraclavicular lymphadenopathy noted.  Lungs: Clear to auscultation bilaterally. No wheezes, rhonchi, or rales.   Cardiovascular: Regular rate and rhythm, no murmurs noted.   Abdomen: Soft, nontender, nondistended.   Extremities: No edema or erythema.  Psychiatric:  Behavior, mood, and affect are appropriate.        Assessment & Plan:     68 y.o. female with the following -     The patient feels well and denies any acute medical complaints.  The patient's past medical and surgical history, medications, allergies, and family history were reviewed.  The patient is up to date on labs, immunizations, and other preventative care.     Health maintenance:  Diet:   Exercise:   Substance Use:     Immunizations:  Influenza:  PCV-20:  Tetanus:   Shingles:     Cancer screenings:  Colorectal Cancer Screening:   Cervical Cancer Screening:   Breast Cancer Screening:     Wellness examination  The patient feels well and denies any complaints. There are no concerning signs and/or symptoms of any significant disease process.  Normal exam findings.  The patient was counseled about regular exercise, healthy diet, abstinence from smoking, drugs, and excessive alcohol.     HCM:  Completed  Labs per orders  Immunizations per orders  The patient was counseled about regular exercise, healthy diet, abstinence from smoking, drugs, and excessive alcohol.    Assessment & Plan            No follow-ups on file.    Please note that this dictation was created using voice recognition software. I have made every reasonable attempt to correct obvious errors, but I expect that there are errors of grammar and possibly content that I did not discover before finalizing the note.

## 2024-06-19 SDOH — ECONOMIC STABILITY: HOUSING INSECURITY: IN THE LAST 12 MONTHS, HOW MANY PLACES HAVE YOU LIVED?: 1

## 2024-06-19 SDOH — HEALTH STABILITY: PHYSICAL HEALTH: ON AVERAGE, HOW MANY DAYS PER WEEK DO YOU ENGAGE IN MODERATE TO STRENUOUS EXERCISE (LIKE A BRISK WALK)?: 0 DAYS

## 2024-06-19 SDOH — ECONOMIC STABILITY: INCOME INSECURITY: IN THE LAST 12 MONTHS, WAS THERE A TIME WHEN YOU WERE NOT ABLE TO PAY THE MORTGAGE OR RENT ON TIME?: NO

## 2024-06-19 SDOH — HEALTH STABILITY: MENTAL HEALTH
STRESS IS WHEN SOMEONE FEELS TENSE, NERVOUS, ANXIOUS, OR CAN'T SLEEP AT NIGHT BECAUSE THEIR MIND IS TROUBLED. HOW STRESSED ARE YOU?: NOT AT ALL

## 2024-06-19 SDOH — HEALTH STABILITY: PHYSICAL HEALTH: ON AVERAGE, HOW MANY MINUTES DO YOU ENGAGE IN EXERCISE AT THIS LEVEL?: 0 MIN

## 2024-06-19 SDOH — ECONOMIC STABILITY: FOOD INSECURITY: WITHIN THE PAST 12 MONTHS, THE FOOD YOU BOUGHT JUST DIDN'T LAST AND YOU DIDN'T HAVE MONEY TO GET MORE.: NEVER TRUE

## 2024-06-19 SDOH — ECONOMIC STABILITY: FOOD INSECURITY: WITHIN THE PAST 12 MONTHS, YOU WORRIED THAT YOUR FOOD WOULD RUN OUT BEFORE YOU GOT MONEY TO BUY MORE.: NEVER TRUE

## 2024-06-19 SDOH — ECONOMIC STABILITY: INCOME INSECURITY: HOW HARD IS IT FOR YOU TO PAY FOR THE VERY BASICS LIKE FOOD, HOUSING, MEDICAL CARE, AND HEATING?: NOT HARD AT ALL

## 2024-06-19 ASSESSMENT — SOCIAL DETERMINANTS OF HEALTH (SDOH)
DO YOU BELONG TO ANY CLUBS OR ORGANIZATIONS SUCH AS CHURCH GROUPS UNIONS, FRATERNAL OR ATHLETIC GROUPS, OR SCHOOL GROUPS?: YES
IN A TYPICAL WEEK, HOW MANY TIMES DO YOU TALK ON THE PHONE WITH FAMILY, FRIENDS, OR NEIGHBORS?: MORE THAN THREE TIMES A WEEK
HOW OFTEN DO YOU HAVE SIX OR MORE DRINKS ON ONE OCCASION: NEVER
HOW OFTEN DO YOU ATTENT MEETINGS OF THE CLUB OR ORGANIZATION YOU BELONG TO?: MORE THAN 4 TIMES PER YEAR
DO YOU BELONG TO ANY CLUBS OR ORGANIZATIONS SUCH AS CHURCH GROUPS UNIONS, FRATERNAL OR ATHLETIC GROUPS, OR SCHOOL GROUPS?: YES
HOW HARD IS IT FOR YOU TO PAY FOR THE VERY BASICS LIKE FOOD, HOUSING, MEDICAL CARE, AND HEATING?: NOT HARD AT ALL
HOW OFTEN DO YOU HAVE A DRINK CONTAINING ALCOHOL: MONTHLY OR LESS
HOW OFTEN DO YOU ATTENT MEETINGS OF THE CLUB OR ORGANIZATION YOU BELONG TO?: MORE THAN 4 TIMES PER YEAR
IN THE PAST 12 MONTHS, HAS THE ELECTRIC, GAS, OIL, OR WATER COMPANY THREATENED TO SHUT OFF SERVICE IN YOUR HOME?: NO
IN A TYPICAL WEEK, HOW MANY TIMES DO YOU TALK ON THE PHONE WITH FAMILY, FRIENDS, OR NEIGHBORS?: MORE THAN THREE TIMES A WEEK
HOW OFTEN DO YOU ATTEND CHURCH OR RELIGIOUS SERVICES?: 1 TO 4 TIMES PER YEAR
WITHIN THE PAST 12 MONTHS, YOU WORRIED THAT YOUR FOOD WOULD RUN OUT BEFORE YOU GOT THE MONEY TO BUY MORE: NEVER TRUE
HOW OFTEN DO YOU GET TOGETHER WITH FRIENDS OR RELATIVES?: ONCE A WEEK
HOW MANY DRINKS CONTAINING ALCOHOL DO YOU HAVE ON A TYPICAL DAY WHEN YOU ARE DRINKING: 1 OR 2
HOW OFTEN DO YOU ATTEND CHURCH OR RELIGIOUS SERVICES?: 1 TO 4 TIMES PER YEAR
HOW OFTEN DO YOU GET TOGETHER WITH FRIENDS OR RELATIVES?: ONCE A WEEK

## 2024-06-19 ASSESSMENT — LIFESTYLE VARIABLES
AUDIT-C TOTAL SCORE: 1
SKIP TO QUESTIONS 9-10: 1
HOW OFTEN DO YOU HAVE A DRINK CONTAINING ALCOHOL: MONTHLY OR LESS
HOW MANY STANDARD DRINKS CONTAINING ALCOHOL DO YOU HAVE ON A TYPICAL DAY: 1 OR 2
HOW OFTEN DO YOU HAVE SIX OR MORE DRINKS ON ONE OCCASION: NEVER

## 2024-06-20 ENCOUNTER — OFFICE VISIT (OUTPATIENT)
Dept: MEDICAL GROUP | Facility: PHYSICIAN GROUP | Age: 69
End: 2024-06-20
Payer: MEDICARE

## 2024-06-20 VITALS
BODY MASS INDEX: 42.68 KG/M2 | WEIGHT: 250 LBS | TEMPERATURE: 97.7 F | HEIGHT: 64 IN | SYSTOLIC BLOOD PRESSURE: 130 MMHG | DIASTOLIC BLOOD PRESSURE: 70 MMHG | OXYGEN SATURATION: 96 % | HEART RATE: 67 BPM

## 2024-06-20 DIAGNOSIS — Z00.00 ENCOUNTER FOR MEDICARE ANNUAL WELLNESS EXAM: ICD-10-CM

## 2024-06-20 DIAGNOSIS — E05.90 SUBCLINICAL HYPERTHYROIDISM: ICD-10-CM

## 2024-06-20 DIAGNOSIS — I10 PRIMARY HYPERTENSION: ICD-10-CM

## 2024-06-20 DIAGNOSIS — E78.2 MIXED HYPERLIPIDEMIA: ICD-10-CM

## 2024-06-20 PROCEDURE — G0439 PPPS, SUBSEQ VISIT: HCPCS | Performed by: INTERNAL MEDICINE

## 2024-06-20 RX ORDER — CYCLOSPORINE 0.5 MG/ML
EMULSION OPHTHALMIC
COMMUNITY

## 2024-06-20 RX ORDER — FLUCONAZOLE 150 MG/1
150 TABLET ORAL DAILY
COMMUNITY
End: 2024-06-20

## 2024-06-20 ASSESSMENT — PATIENT HEALTH QUESTIONNAIRE - PHQ9: CLINICAL INTERPRETATION OF PHQ2 SCORE: 0

## 2024-06-20 ASSESSMENT — FIBROSIS 4 INDEX: FIB4 SCORE: 1.54

## 2024-06-20 ASSESSMENT — ACTIVITIES OF DAILY LIVING (ADL): BATHING_REQUIRES_ASSISTANCE: 0

## 2024-06-20 ASSESSMENT — ENCOUNTER SYMPTOMS: GENERAL WELL-BEING: FAIR

## 2024-06-20 NOTE — PROGRESS NOTES
Chief Complaint   Patient presents with    Annual Exam       HPI:  Kiley Foss is a 68 y.o. here for Medicare Annual Wellness Visit     Patient Active Problem List    Diagnosis Date Noted    Fibrocystic breast changes of both breasts 04/19/2024    Postural urinary incontinence 04/19/2024    Renal cyst 08/03/2022    Kidney stone 08/03/2022    Mixed hyperlipidemia 05/14/2021    Primary osteoarthritis of both knees 05/14/2021    Subclinical hyperthyroidism 03/30/2021    Microscopic hematuria 03/30/2021    Gilbert's disease 12/01/2020    EVE on CPAP 09/26/2020    Allergic rhinitis 10/30/2018    Fear of flying 02/21/2017    Morbid obesity with BMI of 40.0-44.9, adult (HCC) 02/21/2017    Primary hypertension 07/10/2015    REINALDO (generalized anxiety disorder)     Mild intermittent asthma without complication     GERD (gastroesophageal reflux disease)     Multiple thyroid nodules        Current Outpatient Medications   Medication Sig Dispense Refill    ALPRAZolam (XANAX) 0.25 MG Tab       azelastine (ASTELIN) 137 MCG/SPRAY nasal spray 2 sprays in each nostril Nasally Twice a day for 30 day(s)      sertraline (ZOLOFT) 50 MG Tab TAKE 1 TABLET BY MOUTH EVERY DAY (Patient taking differently: Take 25 mg by mouth every day.) 90 Tablet 0    hydroCHLOROthiazide 25 MG Tab TAKE 1 TABLET BY MOUTH EVERY DAY 90 Tablet 0    amLODIPine (NORVASC) 5 MG Tab TAKE 1 TABLET BY MOUTH EVERY DAY 90 Tablet 3    meloxicam (MOBIC) 7.5 MG Tab TAKE 1 TABLET BY MOUTH EVERY DAY 90 Tablet 3    VENTOLIN  (90 Base) MCG/ACT Aero Soln inhalation aerosol INHALE 2 PUFFS BY MOUTH EVERY 6 HOURS AS NEEDED FOR SHORTNESS OF BREATH 18 g 2    cetirizine (ZYRTEC) 10 MG Tab Zyrtec 10 mg tablet   1 tablet every day by oral route.      fluticasone (FLONASE) 50 MCG/ACT nasal spray fluticasone propionate 50 mcg/actuation nasal spray,suspension   2 sprays every day by nasal route.      methimazole (TAPAZOLE) 5 MG Tab Just 1/2 tablet Tuesday, Thursday Saturday,  Sunday      vitamin D (CHOLECALCIFEROL) 1000 UNIT TABS Take 1,000 Units by mouth every day.      Multiple Vitamins-Minerals (WOMENS MULTI VITAMIN & MINERAL PO) Take  by mouth every day.      cycloSPORINE (RESTASIS) 0.05 % ophthalmic emulsion 1 drop into affected eye Ophthalmic Twice a day       No current facility-administered medications for this visit.          Current supplements as per medication list.     Allergies: Amoxicillin-pot clavulanate, Augmentin, Cephalexin, Pcn [penicillins], Sulfa drugs, Diclofenac sodium-propylene glycol, Ibuprofen, Keflex, Penicillin g, and Latex    Current social contact/activities: visit with family and go to dinners with friends.      She  reports that she has never smoked. She has never used smokeless tobacco. She reports current alcohol use. She reports that she does not use drugs.  Counseling given: Not Answered      ROS:    Gait: Uses a cane  Ostomy: No  Other tubes: No  Amputations: No  Chronic oxygen use: No  Last eye exam: 6/2023  Wears hearing aids: No   : Reports urinary leakage during the last 6 months that has not interfered at all with their daily activities or sleep.    Screening:    Depression Screening  Little interest or pleasure in doing things?  0 - not at all  Feeling down, depressed , or hopeless? 0 - not at all  Patient Health Questionnaire Score: 0     If depressive symptoms identified deferred to follow up visit unless specifically addressed in assessment and plan.    Interpretation of PHQ-9 Total Score   Score Severity   1-4 No Depression   5-9 Mild Depression   10-14 Moderate Depression   15-19 Moderately Severe Depression   20-27 Severe Depression    Screening for Cognitive Impairment  Do you or any of your friends or family members have any concern about your memory? No  Three Minute Recall (Leader, Season, Table) 2/3    Uriel clock face with all 12 numbers and set the hands to show 10 minutes after 11.  No    Cognitive concerns identified deferred  for follow up unless specifically addressed in assessment and plan.    Fall Risk Assessment  Has the patient had two or more falls in the last year or any fall with injury in the last year?  No    Safety Assessment  Do you always wear your seatbelt?  Yes  Any changes to home needed to function safely? No  Difficulty hearing.  No  Patient counseled about all safety risks that were identified.    Functional Assessment ADLs  Are there any barriers preventing you from cooking for yourself or meeting nutritional needs?  No.    Are there any barriers preventing you from driving safely or obtaining transportation?  No.    Are there any barriers preventing you from using a telephone or calling for help?  No    Are there any barriers preventing you from shopping?  No.    Are there any barriers preventing you from taking care of your own finances?  No    Are there any barriers preventing you from managing your medications?  No    Are there any barriers preventing you from showering, bathing or dressing yourself? No    Are there any barriers preventing you from doing housework or laundry? No  Are there any barriers preventing you from using the toilet?No  Are you currently engaging in any exercise or physical activity?  No.      Self-Assessment of Health  What is your perception of your health? Fair  Do you sleep more than six hours a night? Yes  In the past 7 days, how much did pain keep you from doing your normal work? A lot  Do you spend quality time with family or friends (virtually or in person)? Yes  Do you usually eat a heart healthy diet that constists of a variety of fruits, vegetables, whole grains and fiber? Yes  Do you eat foods high in fat and/or Fast Food more than three times per week? No    Advance Care Planning  Do you have an Advance Directive, Living Will, Durable Power of , or POLST? Yes  Advance Directive Living Will Durable Power of  POLST is on file      Health Maintenance Summary             Overdue - Annual Wellness Visit (Yearly) Overdue since 12/11/2023 12/11/2022  Initial Annual Wellness Visit - Includes PPPS ()    12/09/2022  Visit Dx: Encounter for Medicare annual wellness exam              Scheduled - Mammogram (Every 2 Years) Scheduled for 7/2/2024 02/08/2022  MA-SCREENING MAMMO BILAT W/TOMOSYNTHESIS W/CAD    10/12/2020  MA-SCREENING MAMMO BILAT W/TOMOSYNTHESIS W/CAD    09/24/2019  MA-SCREENING MAMMO BILAT W/TOMOSYNTHESIS W/CAD    06/15/2018  MA-MAMMO SCREENING BILAT W/MOISE W/CAD    04/25/2017  MA-MAMMO SCREENING BILAT W/MOISE W/CAD    Only the first 5 history entries have been loaded, but more history exists.              Colorectal Cancer Screening (Colonoscopy - Every 5 Years) Next due on 3/25/2026      03/25/2021  Colonoscopy (Done)    11/12/2015  AMB REFERRAL TO GI FOR COLONOSCOPY              Bone Density Scan (Every 5 Years) Next due on 4/6/2028 04/06/2023  DS-BONE DENSITY STUDY (DEXA)              IMM DTaP/Tdap/Td Vaccine (2 - Td or Tdap) Next due on 7/31/2028 07/31/2018  Imm Admin: Tdap Vaccine              Zoster (Shingles) Vaccines (Series Information) Completed      10/17/2018  Imm Admin: Zoster Vaccine Recombinant (RZV) (SHINGRIX)    07/31/2018  Imm Admin: Zoster Vaccine Recombinant (RZV) (SHINGRIX)              Hepatitis C Screening  Completed      01/11/2020  Hepatitis C Antibody component of HEP C VIRUS ANTIBODY              Pneumococcal Vaccine: 65+ Years (Series Information) Completed      02/02/2023  Imm Admin: Pneumococcal polysaccharide vaccine (PPSV-23)    10/17/2012  Imm Admin: Pneumococcal polysaccharide vaccine (PPSV-23)    10/17/2012  Imm Admin: Pneumococcal Conjugate Vaccine (Prevnar/PCV-13)              Influenza Vaccine (Series Information) Completed      09/22/2023  Imm Admin: Influenza Vaccine Adult HD    09/28/2022  Imm Admin: Influenza Vaccine Quad Inj (Pf)    10/10/2021  Imm Admin: Influenza Vaccine Quad Inj (Pf)    09/22/2020  Imm  Admin: Influenza Vaccine Quad Recombinant    09/19/2019  Imm Admin: Influenza Vaccine Quad Inj (Pf)    Only the first 5 history entries have been loaded, but more history exists.              COVID-19 Vaccine (Series Information) Completed      10/09/2023  Imm Admin: Covid-19 Mrna (Spikevax) Moderna 12+ Years    10/12/2022  Imm Admin: MODERNA BIVALENT BOOSTER SARS-COV-2 VACCINE (6+)    06/15/2022  Imm Admin: MODERNA SARS-COV-2 VACCINE (12+)    11/20/2021  Imm Admin: MODERNA SARS-COV-2 VACCINE (12+)    02/18/2021  Imm Admin: MODERNA SARS-COV-2 VACCINE (12+)    Only the first 5 history entries have been loaded, but more history exists.              Hepatitis A Vaccine (Hep A) (Series Information) Aged Out      No completion history exists for this topic.              HPV Vaccines (Series Information) Aged Out      No completion history exists for this topic.              Polio Vaccine (Inactivated Polio) (Series Information) Aged Out      No completion history exists for this topic.              Meningococcal Immunization (Series Information) Aged Out      No completion history exists for this topic.              Discontinued - Cervical Cancer Screening  Discontinued        Frequency changed to Never automatically (Topic No Longer Applies)    08/06/2020  PAP IG (IMAGE GUIDED)    01/05/2017  PATHOLOGY GYN SPECIMEN    01/05/2017  THINPREP PAP WITH HPV    08/14/2012  PAP IG (IMAGE GUIDED)    Only the first 5 history entries have been loaded, but more history exists.              Discontinued - Hepatitis B Vaccine (Hep B)  Discontinued      No completion history exists for this topic.                    Patient Care Team:  Oralia Maciel M.D. as PCP - General (Internal Medicine)  Arnulfo Lind M.D. as Consulting Physician (Orthopedic Surgery)  Mitzi Collins M.D. as Consulting Physician (Endocrinology)  Kelley Buck M.D. as Consulting Physician (Surgery)  Vitor Magallon M.D. as Consulting Physician (OB & Gyn  - Gynecology)  Robyn Quinones M.D. (Otolaryngology)  Paul Monson M.D. (Cardiovascular Disease (Cardiology))  Felipe St M.D. (Gastroenterology)  aBlta Burnett M.D. (Urology)  [employer] as Respiratory Therapist (DME Supplier)        Social History     Tobacco Use    Smoking status: Never    Smokeless tobacco: Never   Vaping Use    Vaping status: Never Used   Substance Use Topics    Alcohol use: Yes     Comment: twice a month    Drug use: No     Family History   Problem Relation Age of Onset    Hypertension Mother     Diabetes Mother     Cancer Father         Non Hodgekin's Lymphoma    Cancer Sister         cervical    Cancer Brother         skin    Heart Disease Maternal Grandfather     Stroke Maternal Grandfather     Heart Disease Paternal Grandfather     Stroke Paternal Grandfather      She  has a past medical history of Anxiety, Arrhythmia (11/2019), Arthritis (01/21/2020), ASTHMA, Chronic pain of both knees (6/20/2018), Daytime sleepiness, Dyslipidemia, Dysuria (5/27/2020), Edema extremities (5/30/2018), Gasping for breath, GERD (gastroesophageal reflux disease), Heart burn (01/21/2020), Heart palpitations (8/14/2019), Hemorrhage, Hemorrhoids (11/12/15), Hypertension, Infectious disease (01/21/2020), Insomnia, Low serum vitamin D (9/19/2017), Multiple thyroid nodules (01/21/2020), Non-seasonal allergic rhinitis, Non-smoker (9/28/2020), Obesity -- 43 (7/27/2011), Pain (01/21/2020), RAD (reactive airway disease) (7/1/2011), Rash (5/14/2021), Simple phobia, Sinus infection (01/21/2020), Sleep apnea (01/21/2020), Snoring, Urinary incontinence (01/21/2020), and Varicose vein of leg (7/10/2015).    She has no past medical history of Breast cancer (HCC) or Morning headache.   Past Surgical History:   Procedure Laterality Date    BIOPSY GENERAL Bilateral 01/08/2020    moles removed back of left arm and right shoulder    COLONOSCOPY N/A 2016    Polyps and on 5 year plan for testing    BIOPSY GENERAL       "thyroid     DENTAL EXTRACTION(S) N/A 1980's    3 extracted over a period of time    SALPINGECTOMY      ectopic pregnancy    SINUSOTOMIES      TONSILLECTOMY      TUBAL COAGULATION LAPAROSCOPIC BILATERAL         Exam:   /70   Pulse 67   Temp 36.5 °C (97.7 °F) (Temporal)   Ht 1.626 m (5' 4\")   Wt 113 kg (250 lb)   SpO2 96%  Body mass index is 42.91 kg/m².    Hearing good.    Dentition good  Alert, oriented in no acute distress.  Eye contact is good, speech goal directed, affect calm    Assessment and Plan. The following treatment and monitoring plan is recommended:      Encounter for Medicare annual wellness exam  - Subsequent Annual Wellness Visit - Includes PPPS ()    Subclinical hyperthyroidism  Chronic condition, controlled.  The patient is managed by endocrinology, Dr. Collins.  The patient is taking methimazole 2.5 mg daily.  Labs on 6/11/2024 showed a TSH of 0.851, free T41.28, and free T33.07.  Thyroid uptake scan on 4/5/2021 was normal.  She is clinically euthyroid.    -Continue methimazole 2.5 mg  as managed per endocrinology     Primary hypertension  Chronic condition, controlled.  The patient is taking amlodipine 5 mg daily and HCTZ 25 mg daily.  Blood pressure today's visit is 130/70.  She denies new headaches, vision changes, chest pain, shortness of breath, lower extremity edema.   -Continue current regimen of HCTZ 25 mg daily and amlodipine 5 mg daily     Mixed hyperlipidemia  Chronic condition, improved.  Currently managed through dietary modification with a low-cholesterol diet.  Lipid panel on 6/11/2024 showed a total cholesterol of 170, , HDL 50, triglycerides 91.  The 10-year ASCVD risk score (Tony DK, et al., 2019) is: 8.2%  -Continue dietary management with a low cholesterol diet given the patient's borderline 10-year ASCVD risk score     Services suggested: No services needed at this time  Health Care Screening: Age-appropriate preventive services recommended by USPTF and " ACIP covered by Medicare were discussed today. Services ordered if indicated and agreed upon by the patient.  Referrals offered: Community-based lifestyle interventions to reduce health risks and promote self-management and wellness, fall prevention, nutrition, physical activity, tobacco-use cessation, weight loss, and mental health services as per orders if indicated.    Discussion today about general wellness and lifestyle habits:    Prevent falls and reduce trip hazards; Cautioned about securing or removing rugs.  Have a working fire alarm and carbon monoxide detector;   Engage in regular physical activity and social activities     Follow-up: Return in about 1 year (around 6/20/2025) for Annual/Wellness Visit.    Please note that this dictation was created using voice recognition software. I have made every reasonable attempt to correct obvious errors, but I expect that there are errors of grammar and possibly content that I did not discover before finalizing the note.

## 2024-07-02 ENCOUNTER — HOSPITAL ENCOUNTER (OUTPATIENT)
Dept: RADIOLOGY | Facility: MEDICAL CENTER | Age: 69
End: 2024-07-02
Attending: FAMILY MEDICINE
Payer: MEDICARE

## 2024-07-02 DIAGNOSIS — N60.11 FIBROCYSTIC BREAST CHANGES OF BOTH BREASTS: ICD-10-CM

## 2024-07-02 DIAGNOSIS — Z12.31 ENCOUNTER FOR SCREENING MAMMOGRAM FOR MALIGNANT NEOPLASM OF BREAST: ICD-10-CM

## 2024-07-02 DIAGNOSIS — N60.12 FIBROCYSTIC BREAST CHANGES OF BOTH BREASTS: ICD-10-CM

## 2024-07-02 PROCEDURE — 77063 BREAST TOMOSYNTHESIS BI: CPT

## 2024-07-02 PROCEDURE — 76641 ULTRASOUND BREAST COMPLETE: CPT

## 2024-07-11 RX ORDER — MELOXICAM 7.5 MG/1
7.5 TABLET ORAL DAILY
Qty: 90 TABLET | Refills: 3 | Status: SHIPPED | OUTPATIENT
Start: 2024-07-11

## 2024-07-16 DIAGNOSIS — I10 ESSENTIAL HYPERTENSION: ICD-10-CM

## 2024-07-16 RX ORDER — HYDROCHLOROTHIAZIDE 25 MG/1
25 TABLET ORAL
Qty: 90 TABLET | Refills: 3 | Status: SHIPPED | OUTPATIENT
Start: 2024-07-16

## 2024-09-11 ENCOUNTER — HOSPITAL ENCOUNTER (OUTPATIENT)
Dept: LAB | Facility: MEDICAL CENTER | Age: 69
End: 2024-09-11
Attending: INTERNAL MEDICINE
Payer: MEDICARE

## 2024-09-11 LAB
T4 FREE SERPL-MCNC: 1.14 NG/DL (ref 0.93–1.7)
TSH SERPL-ACNC: 1.66 UIU/ML (ref 0.35–5.5)

## 2024-09-11 PROCEDURE — 36415 COLL VENOUS BLD VENIPUNCTURE: CPT

## 2024-09-11 PROCEDURE — 84443 ASSAY THYROID STIM HORMONE: CPT

## 2024-09-11 PROCEDURE — 84439 ASSAY OF FREE THYROXINE: CPT

## 2024-10-21 SDOH — HEALTH STABILITY: PHYSICAL HEALTH: ON AVERAGE, HOW MANY DAYS PER WEEK DO YOU ENGAGE IN MODERATE TO STRENUOUS EXERCISE (LIKE A BRISK WALK)?: 0 DAYS

## 2024-10-21 SDOH — ECONOMIC STABILITY: INCOME INSECURITY: IN THE LAST 12 MONTHS, WAS THERE A TIME WHEN YOU WERE NOT ABLE TO PAY THE MORTGAGE OR RENT ON TIME?: NO

## 2024-10-21 SDOH — ECONOMIC STABILITY: FOOD INSECURITY: WITHIN THE PAST 12 MONTHS, THE FOOD YOU BOUGHT JUST DIDN'T LAST AND YOU DIDN'T HAVE MONEY TO GET MORE.: NEVER TRUE

## 2024-10-21 SDOH — ECONOMIC STABILITY: FOOD INSECURITY: WITHIN THE PAST 12 MONTHS, YOU WORRIED THAT YOUR FOOD WOULD RUN OUT BEFORE YOU GOT MONEY TO BUY MORE.: NEVER TRUE

## 2024-10-21 SDOH — HEALTH STABILITY: PHYSICAL HEALTH: ON AVERAGE, HOW MANY MINUTES DO YOU ENGAGE IN EXERCISE AT THIS LEVEL?: 0 MIN

## 2024-10-21 SDOH — ECONOMIC STABILITY: INCOME INSECURITY: HOW HARD IS IT FOR YOU TO PAY FOR THE VERY BASICS LIKE FOOD, HOUSING, MEDICAL CARE, AND HEATING?: NOT HARD AT ALL

## 2024-10-21 ASSESSMENT — SOCIAL DETERMINANTS OF HEALTH (SDOH)
DO YOU BELONG TO ANY CLUBS OR ORGANIZATIONS SUCH AS CHURCH GROUPS UNIONS, FRATERNAL OR ATHLETIC GROUPS, OR SCHOOL GROUPS?: YES
HOW OFTEN DO YOU ATTENT MEETINGS OF THE CLUB OR ORGANIZATION YOU BELONG TO?: 1 TO 4 TIMES PER YEAR
HOW OFTEN DO YOU ATTEND CHURCH OR RELIGIOUS SERVICES?: 1 TO 4 TIMES PER YEAR
HOW OFTEN DO YOU ATTENT MEETINGS OF THE CLUB OR ORGANIZATION YOU BELONG TO?: 1 TO 4 TIMES PER YEAR
HOW OFTEN DO YOU HAVE A DRINK CONTAINING ALCOHOL: 2-4 TIMES A MONTH
WITHIN THE PAST 12 MONTHS, YOU WORRIED THAT YOUR FOOD WOULD RUN OUT BEFORE YOU GOT THE MONEY TO BUY MORE: NEVER TRUE
IN THE PAST 12 MONTHS, HAS THE ELECTRIC, GAS, OIL, OR WATER COMPANY THREATENED TO SHUT OFF SERVICE IN YOUR HOME?: NO
DO YOU BELONG TO ANY CLUBS OR ORGANIZATIONS SUCH AS CHURCH GROUPS UNIONS, FRATERNAL OR ATHLETIC GROUPS, OR SCHOOL GROUPS?: YES
HOW OFTEN DO YOU ATTEND CHURCH OR RELIGIOUS SERVICES?: 1 TO 4 TIMES PER YEAR
HOW MANY DRINKS CONTAINING ALCOHOL DO YOU HAVE ON A TYPICAL DAY WHEN YOU ARE DRINKING: 1 OR 2
HOW OFTEN DO YOU GET TOGETHER WITH FRIENDS OR RELATIVES?: TWICE A WEEK
IN A TYPICAL WEEK, HOW MANY TIMES DO YOU TALK ON THE PHONE WITH FAMILY, FRIENDS, OR NEIGHBORS?: MORE THAN THREE TIMES A WEEK
IN A TYPICAL WEEK, HOW MANY TIMES DO YOU TALK ON THE PHONE WITH FAMILY, FRIENDS, OR NEIGHBORS?: MORE THAN THREE TIMES A WEEK
HOW HARD IS IT FOR YOU TO PAY FOR THE VERY BASICS LIKE FOOD, HOUSING, MEDICAL CARE, AND HEATING?: NOT HARD AT ALL
HOW OFTEN DO YOU GET TOGETHER WITH FRIENDS OR RELATIVES?: TWICE A WEEK
HOW OFTEN DO YOU HAVE SIX OR MORE DRINKS ON ONE OCCASION: NEVER

## 2024-10-21 ASSESSMENT — LIFESTYLE VARIABLES
SKIP TO QUESTIONS 9-10: 1
HOW MANY STANDARD DRINKS CONTAINING ALCOHOL DO YOU HAVE ON A TYPICAL DAY: 1 OR 2
HOW OFTEN DO YOU HAVE SIX OR MORE DRINKS ON ONE OCCASION: NEVER
HOW OFTEN DO YOU HAVE A DRINK CONTAINING ALCOHOL: 2-4 TIMES A MONTH
AUDIT-C TOTAL SCORE: 2

## 2024-10-22 ENCOUNTER — APPOINTMENT (RX ONLY)
Dept: URBAN - METROPOLITAN AREA CLINIC 4 | Facility: CLINIC | Age: 69
Setting detail: DERMATOLOGY
End: 2024-10-22

## 2024-10-22 DIAGNOSIS — D22 MELANOCYTIC NEVI: ICD-10-CM

## 2024-10-22 DIAGNOSIS — L11.1 TRANSIENT ACANTHOLYTIC DERMATOSIS [GROVER]: ICD-10-CM

## 2024-10-22 DIAGNOSIS — L82.1 OTHER SEBORRHEIC KERATOSIS: ICD-10-CM

## 2024-10-22 DIAGNOSIS — Z71.89 OTHER SPECIFIED COUNSELING: ICD-10-CM

## 2024-10-22 PROBLEM — D22.5 MELANOCYTIC NEVI OF TRUNK: Status: ACTIVE | Noted: 2024-10-22

## 2024-10-22 PROCEDURE — 99213 OFFICE O/P EST LOW 20 MIN: CPT

## 2024-10-22 PROCEDURE — ? SUNSCREEN RECOMMENDATIONS

## 2024-10-22 PROCEDURE — ? COUNSELING

## 2024-10-22 ASSESSMENT — LOCATION ZONE DERM
LOCATION ZONE: NECK
LOCATION ZONE: TRUNK

## 2024-10-22 ASSESSMENT — LOCATION DETAILED DESCRIPTION DERM
LOCATION DETAILED: RIGHT MEDIAL SUPERIOR CHEST
LOCATION DETAILED: SUPERIOR LUMBAR SPINE
LOCATION DETAILED: RIGHT INFERIOR MEDIAL MIDBACK
LOCATION DETAILED: LEFT SUPERIOR MEDIAL LOWER BACK
LOCATION DETAILED: LEFT INFERIOR LATERAL MIDBACK
LOCATION DETAILED: LEFT INFERIOR LATERAL NECK
LOCATION DETAILED: RIGHT SUPERIOR MEDIAL MIDBACK

## 2024-10-22 ASSESSMENT — LOCATION SIMPLE DESCRIPTION DERM
LOCATION SIMPLE: LOWER BACK
LOCATION SIMPLE: CHEST
LOCATION SIMPLE: LEFT LOWER BACK
LOCATION SIMPLE: RIGHT LOWER BACK
LOCATION SIMPLE: LEFT ANTERIOR NECK

## 2024-10-23 ENCOUNTER — OFFICE VISIT (OUTPATIENT)
Dept: MEDICAL GROUP | Facility: PHYSICIAN GROUP | Age: 69
End: 2024-10-23
Payer: MEDICARE

## 2024-10-23 VITALS
OXYGEN SATURATION: 98 % | BODY MASS INDEX: 43.71 KG/M2 | DIASTOLIC BLOOD PRESSURE: 70 MMHG | SYSTOLIC BLOOD PRESSURE: 124 MMHG | HEART RATE: 68 BPM | HEIGHT: 64 IN | TEMPERATURE: 98.2 F | WEIGHT: 256 LBS

## 2024-10-23 DIAGNOSIS — E05.90 SUBCLINICAL HYPERTHYROIDISM: ICD-10-CM

## 2024-10-23 DIAGNOSIS — E78.2 MIXED HYPERLIPIDEMIA: ICD-10-CM

## 2024-10-23 DIAGNOSIS — I10 PRIMARY HYPERTENSION: ICD-10-CM

## 2024-10-23 DIAGNOSIS — K21.9 GASTROESOPHAGEAL REFLUX DISEASE, UNSPECIFIED WHETHER ESOPHAGITIS PRESENT: ICD-10-CM

## 2024-10-23 DIAGNOSIS — E55.9 VITAMIN D DEFICIENCY: ICD-10-CM

## 2024-10-23 DIAGNOSIS — Z76.89 ENCOUNTER TO ESTABLISH CARE: ICD-10-CM

## 2024-10-23 DIAGNOSIS — R71.8 ELEVATED HEMATOCRIT: ICD-10-CM

## 2024-10-23 DIAGNOSIS — N39.492 POSTURAL URINARY INCONTINENCE: ICD-10-CM

## 2024-10-23 DIAGNOSIS — J45.20 MILD INTERMITTENT ASTHMA WITHOUT COMPLICATION: ICD-10-CM

## 2024-10-23 DIAGNOSIS — E04.2 MULTIPLE THYROID NODULES: ICD-10-CM

## 2024-10-23 DIAGNOSIS — M17.0 PRIMARY OSTEOARTHRITIS OF BOTH KNEES: ICD-10-CM

## 2024-10-23 DIAGNOSIS — N60.11 FIBROCYSTIC BREAST CHANGES OF BOTH BREASTS: ICD-10-CM

## 2024-10-23 DIAGNOSIS — R31.29 MICROSCOPIC HEMATURIA: ICD-10-CM

## 2024-10-23 DIAGNOSIS — N60.12 FIBROCYSTIC BREAST CHANGES OF BOTH BREASTS: ICD-10-CM

## 2024-10-23 DIAGNOSIS — E80.4 GILBERT'S DISEASE: ICD-10-CM

## 2024-10-23 DIAGNOSIS — F41.1 GAD (GENERALIZED ANXIETY DISORDER): ICD-10-CM

## 2024-10-23 DIAGNOSIS — G47.33 OSA ON CPAP: ICD-10-CM

## 2024-10-23 DIAGNOSIS — E66.01 MORBID OBESITY WITH BMI OF 40.0-44.9, ADULT (HCC): ICD-10-CM

## 2024-10-23 DIAGNOSIS — N28.1 RENAL CYST: ICD-10-CM

## 2024-10-23 PROBLEM — N20.0 KIDNEY STONE: Status: RESOLVED | Noted: 2022-08-03 | Resolved: 2024-10-23

## 2024-10-23 PROCEDURE — 3078F DIAST BP <80 MM HG: CPT | Performed by: STUDENT IN AN ORGANIZED HEALTH CARE EDUCATION/TRAINING PROGRAM

## 2024-10-23 PROCEDURE — 3074F SYST BP LT 130 MM HG: CPT | Performed by: STUDENT IN AN ORGANIZED HEALTH CARE EDUCATION/TRAINING PROGRAM

## 2024-10-23 PROCEDURE — 99215 OFFICE O/P EST HI 40 MIN: CPT | Performed by: STUDENT IN AN ORGANIZED HEALTH CARE EDUCATION/TRAINING PROGRAM

## 2024-10-23 ASSESSMENT — ENCOUNTER SYMPTOMS
CHILLS: 0
FEVER: 0
PALPITATIONS: 0
SHORTNESS OF BREATH: 0

## 2024-10-23 ASSESSMENT — FIBROSIS 4 INDEX: FIB4 SCORE: 1.56

## 2024-10-28 ENCOUNTER — PATIENT MESSAGE (OUTPATIENT)
Dept: MEDICAL GROUP | Facility: PHYSICIAN GROUP | Age: 69
End: 2024-10-28
Payer: MEDICARE

## 2024-10-28 DIAGNOSIS — R06.02 SOB (SHORTNESS OF BREATH): ICD-10-CM

## 2024-10-28 DIAGNOSIS — J45.20 MILD INTERMITTENT ASTHMA WITHOUT COMPLICATION: ICD-10-CM

## 2024-10-28 RX ORDER — ALBUTEROL SULFATE 90 UG/1
2 AEROSOL, METERED RESPIRATORY (INHALATION) EVERY 6 HOURS PRN
Qty: 18 G | Refills: 1 | Status: SHIPPED | OUTPATIENT
Start: 2024-10-28

## 2024-10-28 RX ORDER — ALBUTEROL SULFATE 90 UG/1
2 INHALANT RESPIRATORY (INHALATION) EVERY 6 HOURS PRN
COMMUNITY
End: 2024-10-28

## 2024-12-10 ENCOUNTER — HOSPITAL ENCOUNTER (OUTPATIENT)
Dept: LAB | Facility: MEDICAL CENTER | Age: 69
End: 2024-12-10
Attending: STUDENT IN AN ORGANIZED HEALTH CARE EDUCATION/TRAINING PROGRAM
Payer: MEDICARE

## 2024-12-10 PROCEDURE — 84443 ASSAY THYROID STIM HORMONE: CPT

## 2024-12-10 PROCEDURE — 83520 IMMUNOASSAY QUANT NOS NONAB: CPT

## 2024-12-10 PROCEDURE — 84481 FREE ASSAY (FT-3): CPT

## 2024-12-10 PROCEDURE — 84445 ASSAY OF TSI GLOBULIN: CPT

## 2024-12-10 PROCEDURE — 84439 ASSAY OF FREE THYROXINE: CPT

## 2024-12-10 PROCEDURE — 36415 COLL VENOUS BLD VENIPUNCTURE: CPT

## 2024-12-11 LAB
T3FREE SERPL-MCNC: 3.13 PG/ML (ref 2–4.4)
T4 FREE SERPL-MCNC: 1.17 NG/DL (ref 0.93–1.7)
TSH SERPL-ACNC: 1.34 UIU/ML (ref 0.35–5.5)

## 2024-12-12 LAB — TSI SER-ACNC: <0.1 IU/L

## 2024-12-13 LAB — TSH RECEP AB SER-ACNC: <1.1 IU/L

## 2024-12-25 DIAGNOSIS — J45.20 MILD INTERMITTENT ASTHMA WITHOUT COMPLICATION: ICD-10-CM

## 2024-12-25 DIAGNOSIS — R06.02 SOB (SHORTNESS OF BREATH): ICD-10-CM

## 2024-12-25 RX ORDER — ALBUTEROL SULFATE 0.83 MG/ML
2.5 SOLUTION RESPIRATORY (INHALATION) EVERY 4 HOURS PRN
Qty: 75 ML | Refills: 0 | Status: SHIPPED | OUTPATIENT
Start: 2024-12-25

## 2024-12-26 NOTE — PROGRESS NOTES
Requested Prescriptions     Signed Prescriptions Disp Refills    albuterol (PROVENTIL) 2.5mg/3ml Nebu Soln solution for nebulization 75 mL 0     Sig: Take 3 mL by nebulization every four hours as needed for Shortness of Breath.       [facility].

## 2025-01-03 ENCOUNTER — OFFICE VISIT (OUTPATIENT)
Dept: URGENT CARE | Facility: PHYSICIAN GROUP | Age: 70
End: 2025-01-03
Payer: MEDICARE

## 2025-01-03 VITALS
BODY MASS INDEX: 43.02 KG/M2 | SYSTOLIC BLOOD PRESSURE: 118 MMHG | DIASTOLIC BLOOD PRESSURE: 72 MMHG | HEART RATE: 72 BPM | TEMPERATURE: 98.1 F | OXYGEN SATURATION: 96 % | HEIGHT: 64 IN | WEIGHT: 252 LBS | RESPIRATION RATE: 14 BRPM

## 2025-01-03 DIAGNOSIS — J20.9 ACUTE BRONCHITIS, UNSPECIFIED ORGANISM: ICD-10-CM

## 2025-01-03 DIAGNOSIS — J45.901 EXACERBATION OF ASTHMA, UNSPECIFIED ASTHMA SEVERITY, UNSPECIFIED WHETHER PERSISTENT: ICD-10-CM

## 2025-01-03 PROCEDURE — 3078F DIAST BP <80 MM HG: CPT | Performed by: FAMILY MEDICINE

## 2025-01-03 PROCEDURE — 99214 OFFICE O/P EST MOD 30 MIN: CPT | Performed by: FAMILY MEDICINE

## 2025-01-03 PROCEDURE — 3074F SYST BP LT 130 MM HG: CPT | Performed by: FAMILY MEDICINE

## 2025-01-03 RX ORDER — PREDNISONE 10 MG/1
30 TABLET ORAL EVERY MORNING
Qty: 15 TABLET | Refills: 0 | Status: SHIPPED | OUTPATIENT
Start: 2025-01-03 | End: 2025-01-08

## 2025-01-03 RX ORDER — BENZONATATE 200 MG/1
200 CAPSULE ORAL 3 TIMES DAILY PRN
Qty: 30 CAPSULE | Refills: 0 | Status: SHIPPED | OUTPATIENT
Start: 2025-01-03

## 2025-01-03 RX ORDER — DOXYCYCLINE 100 MG/1
100 CAPSULE ORAL 2 TIMES DAILY
Qty: 14 CAPSULE | Refills: 0 | Status: SHIPPED | OUTPATIENT
Start: 2025-01-03

## 2025-01-03 ASSESSMENT — FIBROSIS 4 INDEX: FIB4 SCORE: 1.56

## 2025-01-03 ASSESSMENT — ENCOUNTER SYMPTOMS: COUGH: 1

## 2025-01-04 NOTE — PROGRESS NOTES
Subjective     Kiley Foss is a 69 y.o. female who presents with URI (Been sick since mid dec /P/t has asthma as well /Just can't shake this cold )    This is a  new problem with uncertain prognosis along with exacerbation:   This is a very pleasant 69 y.o. who has come to the walk-in clinic today for 2 weeks sinus pain and pressure occasional discharge coughing feels tight in chest and wheezing like asthma acting up.  No nausea vomiting fevers chills in the past has done prednisone and helps well          ALLERGIES:  Amoxicillin-pot clavulanate, Augmentin, Cephalexin, Pcn [penicillins], Sulfa drugs, Diclofenac sodium-propylene glycol, Ibuprofen, Keflex, Penicillin g, and Latex     PMH:  Past Medical History:   Diagnosis Date    Anxiety     Arrhythmia 11/2019    Flutter feeling, vibration, palpitation, PVC's occ, had [place] Patch in Dec 2019    Arthritis 01/21/2020    Osteo in knees    ASTHMA     Chronic pain of both knees 06/20/2018    Bilateral cortisone shots to both knees in Jan 2019    Daytime sleepiness     due to knee pain , busy mind , Naps once a day OCC    Dyslipidemia     Dysuria 05/27/2020    Edema extremities 05/30/2018     IMO 10/1 Regulatory Update    Gasping for breath     GERD (gastroesophageal reflux disease)     Heart burn 01/21/2020    Heart palpitations 08/14/2019     IMO load March 2020    Hemorrhage     Hemorrhoids 11/12/2015    colonoscopy     Hypertension     Infectious disease 01/21/2020    Sick grandchild and  with flu and cold    Insomnia     trouble going to sleep and staying asleep    Kidney stone 08/03/2022    Low serum vitamin D 09/19/2017    Multiple thyroid nodules 01/21/2020    enlarged thyroid with nodules, Followed by Dr. Collins    Non-seasonal allergic rhinitis     Non-smoker 09/28/2020    Obesity -- 43 07/27/2011    Pain 01/21/2020    knees    RAD (reactive airway disease) 07/01/2011    Only issues with colds, environmental    Rash 05/14/2021    Simple phobia      flying    Sinus infection 01/21/2020    currently being treated with antibiotics and steroids by Dr. Quinones    Sleep apnea 01/21/2020    Sleep study on 5/1/20    Snoring     Urinary incontinence 01/21/2020    Uses Maxi pads    Varicose vein of leg 07/10/2015        PSH:  Past Surgical History:   Procedure Laterality Date    BIOPSY GENERAL Bilateral 01/08/2020    moles removed back of left arm and right shoulder    COLONOSCOPY N/A 2016    Polyps and on 5 year plan for testing    BIOPSY GENERAL      thyroid     DENTAL EXTRACTION(S) N/A 1980's    3 extracted over a period of time    SALPINGECTOMY      ectopic pregnancy    SINUSOTOMIES      TONSILLECTOMY      TUBAL COAGULATION LAPAROSCOPIC BILATERAL         MEDS:    Current Outpatient Medications:     doxycycline (MONODOX) 100 MG capsule, Take 1 Capsule by mouth 2 times a day., Disp: 14 Capsule, Rfl: 0    benzonatate (TESSALON) 200 MG capsule, Take 1 Capsule by mouth 3 times a day as needed for Cough., Disp: 30 Capsule, Rfl: 0    predniSONE (DELTASONE) 10 MG Tab, Take 3 Tablets by mouth every morning for 5 days., Disp: 15 Tablet, Rfl: 0    albuterol (PROVENTIL) 2.5mg/3ml Nebu Soln solution for nebulization, Take 3 mL by nebulization every four hours as needed for Shortness of Breath., Disp: 75 mL, Rfl: 0    VENTOLIN  (90 Base) MCG/ACT Aero Soln inhalation aerosol, Inhale 2 Puffs every 6 hours as needed for Shortness of Breath., Disp: 18 g, Rfl: 1    hydroCHLOROthiazide 25 MG Tab, TAKE 1 TABLET BY MOUTH EVERY DAY, Disp: 90 Tablet, Rfl: 3    meloxicam (MOBIC) 7.5 MG Tab, TAKE 1 TABLET BY MOUTH EVERY DAY, Disp: 90 Tablet, Rfl: 3    cycloSPORINE (RESTASIS) 0.05 % ophthalmic emulsion, 1 drop into affected eye Ophthalmic Twice a day, Disp: , Rfl:     ALPRAZolam (XANAX) 0.25 MG Tab, , Disp: , Rfl:     azelastine (ASTELIN) 137 MCG/SPRAY nasal spray, 2 sprays in each nostril Nasally Twice a day for 30 day(s), Disp: , Rfl:     amLODIPine (NORVASC) 5 MG Tab, TAKE 1 TABLET  "BY MOUTH EVERY DAY, Disp: 90 Tablet, Rfl: 3    cetirizine (ZYRTEC) 10 MG Tab, Zyrtec 10 mg tablet  1 tablet every day by oral route., Disp: , Rfl:     fluticasone (FLONASE) 50 MCG/ACT nasal spray, fluticasone propionate 50 mcg/actuation nasal spray,suspension  2 sprays every day by nasal route., Disp: , Rfl:     methimazole (TAPAZOLE) 5 MG Tab, Just 1/2 tablet Tuesday, Thursday Saturday, Sunday, Disp: , Rfl:     vitamin D (CHOLECALCIFEROL) 1000 UNIT TABS, Take 1,000 Units by mouth every day., Disp: , Rfl:     Multiple Vitamins-Minerals (WOMENS MULTI VITAMIN & MINERAL PO), Take  by mouth every day., Disp: , Rfl:     sertraline (ZOLOFT) 50 MG Tab, TAKE 1 TABLET BY MOUTH EVERY DAY, Disp: 90 Tablet, Rfl: 3    ** I have documented what I find to be significant in regards to past medical, social, family and surgical history  in my HPI or under PMH/PSH/FH review section, otherwise it is noncontributory **           HPI    Review of Systems   Respiratory:  Positive for cough.    All other systems reviewed and are negative.             Objective     /72 (BP Location: Left arm, Patient Position: Sitting, BP Cuff Size: Adult)   Pulse 72   Temp 36.7 °C (98.1 °F) (Temporal)   Resp 14   Ht 1.626 m (5' 4\")   Wt 114 kg (252 lb)   LMP 07/26/2012   SpO2 96%   BMI 43.26 kg/m²      Physical Exam  Vitals and nursing note reviewed.   Constitutional:       General: She is not in acute distress.     Appearance: Normal appearance. She is well-developed. She is not ill-appearing, toxic-appearing or diaphoretic.   HENT:      Head: Normocephalic.      Nose: Congestion and rhinorrhea present.      Mouth/Throat:      Mouth: Mucous membranes are moist.      Pharynx: Oropharynx is clear. No oropharyngeal exudate or posterior oropharyngeal erythema.   Cardiovascular:      Rate and Rhythm: Normal rate and regular rhythm.   Pulmonary:      Effort: Pulmonary effort is normal. No respiratory distress.      Breath sounds: Normal breath " sounds.   Neurological:      Mental Status: She is alert.      Motor: No abnormal muscle tone.   Psychiatric:         Mood and Affect: Mood normal.         Behavior: Behavior normal.                             Assessment & Plan       1. Acute bronchitis, unspecified organism  doxycycline (MONODOX) 100 MG capsule    benzonatate (TESSALON) 200 MG capsule    predniSONE (DELTASONE) 10 MG Tab      2. Exacerbation of asthma, unspecified asthma severity, unspecified whether persistent  predniSONE (DELTASONE) 10 MG Tab      Ongoing viral respiratory infection with asthma exacerbation turned bacterial sinusitis bronchitis will treat per assessment and plan    - Dx, plan & d/c instructions discussed   - Rest, stay hydrated, OTC Flonase      Follow up with your regular primary care providers office within a week to keep them updated and informed of this visit and for regular routine health maintenance check-ups. ER if not improving in 2-3 days or if feeling/getting worse. (If you do not have a primary care provider and need to schedule one you may call [facility] at [phone] to do this).    Patient left in stable condition     Discussed if any testing, labs or imaging studies are obtained outside of the [facility] facility, it is their responsibility to contact the Urgent Care and let us know that it was done and get us the results so adequate follow up can be initiated    Pertinent prior lab work and/or imaging studies in Epic have been reviewed by me today on day of this visit and taken into account for my treatment and plan today    Pertinent PMH/PSH and/or chronic conditions and medications if any were reviewed today and taken into account for my treatment and plan today    Pertinent prior office visit notes in [facility] have been reviewed by me today on day of this visit.    Please note that this dictation may have been created using voice recognition software, if so I have made every reasonable attempt to correct obvious errors,  but I expect that there are errors of grammar and possibly content that I did not discover before finalizing the note.

## 2025-01-06 DIAGNOSIS — I10 ESSENTIAL HYPERTENSION: ICD-10-CM

## 2025-01-06 RX ORDER — AMLODIPINE BESYLATE 5 MG/1
5 TABLET ORAL
Qty: 100 TABLET | Refills: 3 | Status: SHIPPED | OUTPATIENT
Start: 2025-01-06

## 2025-01-06 NOTE — TELEPHONE ENCOUNTER
Received request via: Pharmacy    Was the patient seen in the last year in this department? Yes    Does the patient have an active prescription (recently filled or refills available) for medication(s) requested? No    Pharmacy Name:   [id number] - [place], [place] - [address] [facility] [facility]  [address] [place] [address]  Phone: [phone] Fax: [phone]        Does the patient have correction Plus and need 100-day supply? (This applies to ALL medications) Patient does not have SCP

## 2025-03-12 ENCOUNTER — HOSPITAL ENCOUNTER (OUTPATIENT)
Dept: LAB | Facility: MEDICAL CENTER | Age: 70
End: 2025-03-12
Attending: INTERNAL MEDICINE
Payer: MEDICARE

## 2025-03-12 PROCEDURE — 36415 COLL VENOUS BLD VENIPUNCTURE: CPT

## 2025-03-12 PROCEDURE — 84443 ASSAY THYROID STIM HORMONE: CPT

## 2025-03-12 PROCEDURE — 84439 ASSAY OF FREE THYROXINE: CPT

## 2025-03-13 LAB
T4 FREE SERPL-MCNC: 1.12 NG/DL (ref 0.93–1.7)
TSH SERPL-ACNC: 1.52 UIU/ML (ref 0.35–5.5)

## 2025-03-28 ENCOUNTER — APPOINTMENT (OUTPATIENT)
Dept: SLEEP MEDICINE | Facility: MEDICAL CENTER | Age: 70
End: 2025-03-28
Attending: PHYSICIAN ASSISTANT
Payer: MEDICARE

## 2025-04-15 ENCOUNTER — OFFICE VISIT (OUTPATIENT)
Dept: SLEEP MEDICINE | Facility: MEDICAL CENTER | Age: 70
End: 2025-04-15
Attending: PHYSICIAN ASSISTANT
Payer: MEDICARE

## 2025-04-15 VITALS
WEIGHT: 250 LBS | SYSTOLIC BLOOD PRESSURE: 118 MMHG | BODY MASS INDEX: 42.68 KG/M2 | OXYGEN SATURATION: 93 % | HEIGHT: 64 IN | HEART RATE: 73 BPM | RESPIRATION RATE: 14 BRPM | DIASTOLIC BLOOD PRESSURE: 62 MMHG

## 2025-04-15 DIAGNOSIS — G47.33 OSA ON CPAP: ICD-10-CM

## 2025-04-15 PROCEDURE — 3078F DIAST BP <80 MM HG: CPT | Performed by: PHYSICIAN ASSISTANT

## 2025-04-15 PROCEDURE — 3074F SYST BP LT 130 MM HG: CPT | Performed by: PHYSICIAN ASSISTANT

## 2025-04-15 PROCEDURE — 99213 OFFICE O/P EST LOW 20 MIN: CPT | Performed by: PHYSICIAN ASSISTANT

## 2025-04-15 ASSESSMENT — ENCOUNTER SYMPTOMS
ORTHOPNEA: 0
HEADACHES: 0
FEVER: 0
SINUS PAIN: 0
INSOMNIA: 0
SHORTNESS OF BREATH: 0
WHEEZING: 0
COUGH: 0
PALPITATIONS: 0
HEARTBURN: 0
ROS GI COMMENTS: NO DENTURES, MISSING TWO MOLARS, NO SWALLOWING ISSUES
SPUTUM PRODUCTION: 0
CHILLS: 0
SORE THROAT: 0
DIZZINESS: 0
TREMORS: 0
WEIGHT LOSS: 0

## 2025-04-15 ASSESSMENT — FIBROSIS 4 INDEX: FIB4 SCORE: 1.56

## 2025-04-15 NOTE — PROGRESS NOTES
"Chief Complaint   Patient presents with    Apnea     Last Office Visit 3/29/24 with Cecile Garcia P.A.-C.    auto CPAP @ 5-15 cm H20 pressure    Set up: 12/12/2022       HPI:  Kiley Foss is a 69 y.o. year old female here today for follow-up on obstructive sleep apnea.  Last seen in clinic 3/29/2024 by CHRISTAL Tellez.  Previously evaluated by Dr. Vickey Lechuga via telemedicine on 9/28/2020.    Past Medical History: EVE, hypertension, Gilbert's disease, generalized anxiety disorder, mild intermittent asthma, GERD, multiple thyroid nodules, obesity, allergic rhinitis, mixed hyperlipidemia.     Vitals:  /62 (BP Location: Left arm, Patient Position: Sitting, BP Cuff Size: Adult)   Pulse 73   Resp 14   Ht 1.626 m (5' 4\")   Wt 113 kg (250 lb)   SpO2 93% BMI of 42.91 kg/m².    Recent Imaging: None    Echocardiogram obtained 8/21/2019 demonstrated normal left ventricular chamber size, wall thickness, systolic and diastolic function. LVEF estimated at 65%. Normal right ventricular size and systolic function. Trace tricuspid regurgitation with estimated RVSP of 25 mmHg.     Currently using  Resmed auto CPAP @ 5-15 cm H20 pressure; compliance reviewed for 3/16/2025-4/14/2025, days used 30/30, average daily usage 8 hours 29 minutes, 100% of days greater than or equal to 4 hours, mask leak at 13.5 LPM at 95th percentile, AHI 3.9 per hour.  See media for full report.    Device obtained January 2022  DME provider [facility] [place]  Mask interface nasal cushion    Overnight home sleep study obtained 11/18/2021 demonstrating findings consistent with severe obstructive sleep apnea with NANCY of 59.8 events per hour, nocturnal hypoxia with low O2 sat of 70% and sats less than been 89% for 406 minutes of evaluated time. Patient was recommended a CPAP titration study versus auto CPAP trial     Sleep schedule goes to bed 10 PM, wakens 7-8 a.m. , and gets up during the night bathroom x 2-3   Symptoms denies day time " somnolence and denies morning headache      Review of Systems   Constitutional:  Negative for chills, fever, malaise/fatigue and weight loss.   HENT:  Positive for congestion (allergies). Negative for hearing loss, nosebleeds, sinus pain, sore throat and tinnitus.    Respiratory:  Negative for cough, sputum production, shortness of breath and wheezing.    Cardiovascular:  Negative for chest pain, palpitations, orthopnea and leg swelling.   Gastrointestinal:  Negative for heartburn.        No dentures, missing two molars, no swallowing issues   Neurological:  Negative for dizziness, tremors and headaches.   Psychiatric/Behavioral:  The patient does not have insomnia.        Past Medical History:   Diagnosis Date    Anxiety     Arrhythmia 11/2019    Flutter feeling, vibration, palpitation, PVC's occ, had [place] Patch in Dec 2019    Arthritis 01/21/2020    Osteo in knees    ASTHMA     Chronic pain of both knees 06/20/2018    Bilateral cortisone shots to both knees in Jan 2019    Daytime sleepiness     due to knee pain , busy mind , Naps once a day OCC    Dyslipidemia     Dysuria 05/27/2020    Edema extremities 05/30/2018     IMO 10/1 Regulatory Update    Gasping for breath     GERD (gastroesophageal reflux disease)     Heart burn 01/21/2020    Heart palpitations 08/14/2019     IMO load March 2020    Hemorrhage     Hemorrhoids 11/12/2015    colonoscopy     Hypertension     Infectious disease 01/21/2020    Sick grandchild and  with flu and cold    Insomnia     trouble going to sleep and staying asleep    Kidney stone 08/03/2022    Low serum vitamin D 09/19/2017    Multiple thyroid nodules 01/21/2020    enlarged thyroid with nodules, Followed by Dr. Collins    Non-seasonal allergic rhinitis     Non-smoker 09/28/2020    Obesity -- 43 07/27/2011    Pain 01/21/2020    knees    RAD (reactive airway disease) 07/01/2011    Only issues with colds, environmental    Rash 05/14/2021    Simple phobia     flying    Sinus infection  01/21/2020    currently being treated with antibiotics and steroids by Dr. Quinones    Sleep apnea 01/21/2020    Sleep study on 5/1/20    Snoring     Urinary incontinence 01/21/2020    Uses Maxi pads    Varicose vein of leg 07/10/2015       Past Surgical History:   Procedure Laterality Date    BIOPSY GENERAL Bilateral 01/08/2020    moles removed back of left arm and right shoulder    COLONOSCOPY N/A 2016    Polyps and on 5 year plan for testing    BIOPSY GENERAL      thyroid     DENTAL EXTRACTION(S) N/A 1980's    3 extracted over a period of time    SALPINGECTOMY      ectopic pregnancy    SINUSOTOMIES      TONSILLECTOMY      TUBAL COAGULATION LAPAROSCOPIC BILATERAL         Family History   Problem Relation Age of Onset    Hypertension Mother     Diabetes Mother     Dementia Father     Cancer Father         Non Hodgekin's Lymphoma    Cancer Sister         cervical    Cancer Brother         skin    Breast Cancer Maternal Aunt     Breast Cancer Paternal Aunt     Heart Disease Maternal Grandfather     Stroke Maternal Grandfather     Heart Disease Paternal Grandfather     Stroke Paternal Grandfather     Colorectal Cancer Neg Hx        Social History     Socioeconomic History    Marital status:      Spouse name: Not on file    Number of children: 2S    Years of education: 1y college    Highest education level: Some college, no degree   Occupational History    Occupation: Retired     Employer: OTHER   Tobacco Use    Smoking status: Never    Smokeless tobacco: Never   Vaping Use    Vaping status: Never Used   Substance and Sexual Activity    Alcohol use: Yes     Comment: twice a month    Drug use: No    Sexual activity: Yes     Partners: Male     Comment: , 2 sons, retired from [employer] [place]   Other Topics Concern    Not on file   Social History Narrative    Not on file     Social Drivers of Health     Financial Resource Strain: Low Risk  (10/21/2024)    Overall Financial Resource Strain (CARDIA)     Difficulty of  Paying Living Expenses: Not hard at all   Food Insecurity: No Food Insecurity (10/21/2024)    Hunger Vital Sign     Worried About Running Out of Food in the Last Year: Never true     Ran Out of Food in the Last Year: Never true   Transportation Needs: No Transportation Needs (10/21/2024)    PRAPARE - Transportation     Lack of Transportation (Medical): No     Lack of Transportation (Non-Medical): No   Physical Activity: Inactive (10/21/2024)    Exercise Vital Sign     Days of Exercise per Week: 0 days     Minutes of Exercise per Session: 0 min   Stress: No Stress Concern Present (10/21/2024)    [place] - Occupational Stress Questionnaire     Feeling of Stress : Not at all   Social Connections: Socially Integrated (10/21/2024)    Social Connection and Isolation Panel [NHANES]     Frequency of Communication with Friends and Family: More than three times a week     Frequency of Social Gatherings with Friends and Family: Twice a week     Attends Alevism Services: 1 to 4 times per year     Active Member of Clubs or Organizations: Yes     Attends Club or Organization Meetings: 1 to 4 times per year     Marital Status:    Intimate Partner Violence: Not on file   Housing Stability: Low Risk  (10/21/2024)    Housing Stability Vital Sign     Unable to Pay for Housing in the Last Year: No     Number of Times Moved in the Last Year: 0     Homeless in the Last Year: No       Allergies as of 04/15/2025 - Reviewed 04/15/2025   Allergen Reaction Noted    Amoxicillin-pot clavulanate Unspecified 06/28/2023    Augmentin Anxiety and Unspecified 01/13/2011    Cephalexin Vomiting 09/21/2015    Pcn [penicillins] Anxiety 01/13/2011    Sulfa drugs Anxiety 12/21/2011    Diclofenac sodium-propylene glycol Anxiety and Palpitations 08/22/2021    Ibuprofen Anxiety and Palpitations 08/22/2021    Keflex Unspecified 12/17/2021    Penicillin g Unspecified 12/17/2021    Latex Rash 01/21/2020          Current  medications as of today   Current Outpatient Medications   Medication Sig Dispense Refill    amLODIPine (NORVASC) 5 MG Tab Take 1 Tablet by mouth every day. 100 Tablet 3    benzonatate (TESSALON) 200 MG capsule Take 1 Capsule by mouth 3 times a day as needed for Cough. 30 Capsule 0    albuterol (PROVENTIL) 2.5mg/3ml Nebu Soln solution for nebulization Take 3 mL by nebulization every four hours as needed for Shortness of Breath. 75 mL 0    VENTOLIN  (90 Base) MCG/ACT Aero Soln inhalation aerosol Inhale 2 Puffs every 6 hours as needed for Shortness of Breath. 18 g 1    sertraline (ZOLOFT) 50 MG Tab TAKE 1 TABLET BY MOUTH EVERY DAY 90 Tablet 3    hydroCHLOROthiazide 25 MG Tab TAKE 1 TABLET BY MOUTH EVERY DAY 90 Tablet 3    meloxicam (MOBIC) 7.5 MG Tab TAKE 1 TABLET BY MOUTH EVERY DAY 90 Tablet 3    cycloSPORINE (RESTASIS) 0.05 % ophthalmic emulsion 1 drop into affected eye Ophthalmic Twice a day      ALPRAZolam (XANAX) 0.25 MG Tab       azelastine (ASTELIN) 137 MCG/SPRAY nasal spray 2 sprays in each nostril Nasally Twice a day for 30 day(s)      cetirizine (ZYRTEC) 10 MG Tab Zyrtec 10 mg tablet   1 tablet every day by oral route.      fluticasone (FLONASE) 50 MCG/ACT nasal spray fluticasone propionate 50 mcg/actuation nasal spray,suspension   2 sprays every day by nasal route.      methimazole (TAPAZOLE) 5 MG Tab Just 1/2 tablet Tuesday, Thursday Saturday, Sunday      vitamin D (CHOLECALCIFEROL) 1000 UNIT TABS Take 1,000 Units by mouth every day.      Multiple Vitamins-Minerals (WOMENS MULTI VITAMIN & MINERAL PO) Take  by mouth every day.      doxycycline (MONODOX) 100 MG capsule Take 1 Capsule by mouth 2 times a day. 14 Capsule 0     No current facility-administered medications for this visit.         Physical Exam:   Gen:           Alert and oriented, No apparent distress. Mood and affect appropriate, normal interaction with examiner.   Hearing:     Grossly intact.  Nose:          Normal, no lesions or  deformities.  Dentition:    Fair dentition.   Oropharynx:   Tongue normal, posterior pharynx without erythema or exudate.  Mallampati Classification: IV  Neck:        Supple, trachea midline, no masses.  Respiratory Effort: No intercostal retractions or use of accessory muscles.   Gait and Station: Altered, requires cane  Digits and Nails: No clubbing, cyanosis, petechiae, or nodes.   Skin:        No rashes, lesions or ulcers noted.               Ext:           No cyanosis or edema.      Immunizations:  Flu: 9/23/2024  Pneumovax 23: 2/2/2023  Prevnar 13: 10/17/2012  Moderna SARS CoV2 Vaccine: 6/15/2022, 11/20/2021, 2/18/2021, 1/21/2021  Moderna booster SARS-CoV-2 vaccine: 10/12/2022  COVID-19 mRNA vaccine: 9/16/2024, 10/9/2023    Assessment / Plan:  1. EVE on CPAP  - DME Mask and Supplies    Reviewed compliance which is excellent, new order placed for mask and supplies which will be sent to [facility].  Patient was reminded to use distilled water only in humidifier chamber with fresh fill daily.  Reviewed equipment cleaning as well as equipment replacement recommendations.  Follow-up in 1 year, sooner if needed.      Follow-up:   Return in about 1 year (around 4/15/2026) for Return with Cecile Garcia PA-C.    Please note that this dictation was created using voice recognition software. I have made every reasonable attempt to correct obvious errors, but it is possible there are errors of grammar and possibly content that I did not discover before finalizing the note.

## 2025-04-15 NOTE — PATIENT INSTRUCTIONS
1-reviewed compliance which is excellent  2-new order placed for mask and supplies  3-send to [facility]  4-As a reminder use distilled water only in humidifier chamber.  Fresh fill daily  5-Today we reviewed equipment cleaning  once weekly minimum  mask, tubing and water chamber  use dedicated container  use mild soap and water  SoClean or other ozone  are not recommended  white vinegar and water solution is no longer recommended  hang tubing to dry  mask sanitizing wipes are an option for use   6-Equipment replacement schedule : Nasal pillows 2 times per month, Head gear every 6 months, Tubing every 3 months, Ultra-fine filters 2 times per month, Humidifier chamber every 6 months   7-follow up in one year

## 2025-06-10 ENCOUNTER — HOSPITAL ENCOUNTER (OUTPATIENT)
Dept: LAB | Facility: MEDICAL CENTER | Age: 70
End: 2025-06-10
Attending: STUDENT IN AN ORGANIZED HEALTH CARE EDUCATION/TRAINING PROGRAM
Payer: MEDICARE

## 2025-06-10 DIAGNOSIS — I10 PRIMARY HYPERTENSION: ICD-10-CM

## 2025-06-10 DIAGNOSIS — R71.8 ELEVATED HEMATOCRIT: ICD-10-CM

## 2025-06-10 DIAGNOSIS — E78.2 MIXED HYPERLIPIDEMIA: ICD-10-CM

## 2025-06-10 DIAGNOSIS — E55.9 VITAMIN D DEFICIENCY: ICD-10-CM

## 2025-06-10 LAB
BASOPHILS # BLD AUTO: 0.9 % (ref 0–1.8)
BASOPHILS # BLD: 0.06 K/UL (ref 0–0.12)
EOSINOPHIL # BLD AUTO: 0.14 K/UL (ref 0–0.51)
EOSINOPHIL NFR BLD: 2.2 % (ref 0–6.9)
ERYTHROCYTE [DISTWIDTH] IN BLOOD BY AUTOMATED COUNT: 43.3 FL (ref 35.9–50)
HCT VFR BLD AUTO: 45.4 % (ref 37–47)
HGB BLD-MCNC: 15.3 G/DL (ref 12–16)
IMM GRANULOCYTES # BLD AUTO: 0.04 K/UL (ref 0–0.11)
IMM GRANULOCYTES NFR BLD AUTO: 0.6 % (ref 0–0.9)
LYMPHOCYTES # BLD AUTO: 1.94 K/UL (ref 1–4.8)
LYMPHOCYTES NFR BLD: 30.6 % (ref 22–41)
MCH RBC QN AUTO: 30.3 PG (ref 27–33)
MCHC RBC AUTO-ENTMCNC: 33.7 G/DL (ref 32.2–35.5)
MCV RBC AUTO: 89.9 FL (ref 81.4–97.8)
MONOCYTES # BLD AUTO: 0.55 K/UL (ref 0–0.85)
MONOCYTES NFR BLD AUTO: 8.7 % (ref 0–13.4)
NEUTROPHILS # BLD AUTO: 3.62 K/UL (ref 1.82–7.42)
NEUTROPHILS NFR BLD: 57 % (ref 44–72)
NRBC # BLD AUTO: 0 K/UL
NRBC BLD-RTO: 0 /100 WBC (ref 0–0.2)
PLATELET # BLD AUTO: 233 K/UL (ref 164–446)
PMV BLD AUTO: 10.5 FL (ref 9–12.9)
RBC # BLD AUTO: 5.05 M/UL (ref 4.2–5.4)
WBC # BLD AUTO: 6.4 K/UL (ref 4.8–10.8)

## 2025-06-10 PROCEDURE — 80061 LIPID PANEL: CPT

## 2025-06-10 PROCEDURE — 36415 COLL VENOUS BLD VENIPUNCTURE: CPT

## 2025-06-10 PROCEDURE — 85025 COMPLETE CBC W/AUTO DIFF WBC: CPT

## 2025-06-10 PROCEDURE — 82306 VITAMIN D 25 HYDROXY: CPT

## 2025-06-10 PROCEDURE — 80053 COMPREHEN METABOLIC PANEL: CPT

## 2025-06-11 ENCOUNTER — RESULTS FOLLOW-UP (OUTPATIENT)
Dept: MEDICAL GROUP | Facility: PHYSICIAN GROUP | Age: 70
End: 2025-06-11

## 2025-06-11 LAB
25(OH)D3 SERPL-MCNC: 27 NG/ML (ref 30–100)
ALBUMIN SERPL BCP-MCNC: 4.5 G/DL (ref 3.2–4.9)
ALBUMIN/GLOB SERPL: 1.6 G/DL
ALP SERPL-CCNC: 80 U/L (ref 30–99)
ALT SERPL-CCNC: 19 U/L (ref 2–50)
ANION GAP SERPL CALC-SCNC: 13 MMOL/L (ref 7–16)
AST SERPL-CCNC: 22 U/L (ref 12–45)
BILIRUB SERPL-MCNC: 1.9 MG/DL (ref 0.1–1.5)
BUN SERPL-MCNC: 23 MG/DL (ref 8–22)
CALCIUM ALBUM COR SERPL-MCNC: 8.9 MG/DL (ref 8.5–10.5)
CALCIUM SERPL-MCNC: 9.3 MG/DL (ref 8.5–10.5)
CHLORIDE SERPL-SCNC: 105 MMOL/L (ref 96–112)
CHOLEST SERPL-MCNC: 180 MG/DL (ref 100–199)
CO2 SERPL-SCNC: 24 MMOL/L (ref 20–33)
CREAT SERPL-MCNC: 0.89 MG/DL (ref 0.5–1.4)
FASTING STATUS PATIENT QL REPORTED: NORMAL
GFR SERPLBLD CREATININE-BSD FMLA CKD-EPI: 71 ML/MIN/1.73 M 2
GLOBULIN SER CALC-MCNC: 2.8 G/DL (ref 1.9–3.5)
GLUCOSE SERPL-MCNC: 91 MG/DL (ref 65–99)
HDLC SERPL-MCNC: 52 MG/DL
LDLC SERPL CALC-MCNC: 109 MG/DL
POTASSIUM SERPL-SCNC: 4.1 MMOL/L (ref 3.6–5.5)
PROT SERPL-MCNC: 7.3 G/DL (ref 6–8.2)
SODIUM SERPL-SCNC: 142 MMOL/L (ref 135–145)
TRIGL SERPL-MCNC: 97 MG/DL (ref 0–149)

## 2025-06-17 ENCOUNTER — HOSPITAL ENCOUNTER (OUTPATIENT)
Facility: MEDICAL CENTER | Age: 70
End: 2025-06-17
Attending: STUDENT IN AN ORGANIZED HEALTH CARE EDUCATION/TRAINING PROGRAM
Payer: MEDICARE

## 2025-06-17 ENCOUNTER — OFFICE VISIT (OUTPATIENT)
Dept: MEDICAL GROUP | Facility: PHYSICIAN GROUP | Age: 70
End: 2025-06-17
Payer: MEDICARE

## 2025-06-17 VITALS
HEART RATE: 70 BPM | TEMPERATURE: 98.7 F | SYSTOLIC BLOOD PRESSURE: 130 MMHG | BODY MASS INDEX: 43.36 KG/M2 | WEIGHT: 254 LBS | OXYGEN SATURATION: 97 % | DIASTOLIC BLOOD PRESSURE: 62 MMHG | HEIGHT: 64 IN

## 2025-06-17 DIAGNOSIS — I10 PRIMARY HYPERTENSION: ICD-10-CM

## 2025-06-17 DIAGNOSIS — M17.0 PRIMARY OSTEOARTHRITIS OF BOTH KNEES: ICD-10-CM

## 2025-06-17 DIAGNOSIS — F41.1 GAD (GENERALIZED ANXIETY DISORDER): ICD-10-CM

## 2025-06-17 DIAGNOSIS — N39.492 POSTURAL URINARY INCONTINENCE: ICD-10-CM

## 2025-06-17 DIAGNOSIS — E78.2 MIXED HYPERLIPIDEMIA: ICD-10-CM

## 2025-06-17 DIAGNOSIS — E66.01 MORBID OBESITY WITH BMI OF 40.0-44.9, ADULT (HCC): ICD-10-CM

## 2025-06-17 DIAGNOSIS — J45.20 MILD INTERMITTENT ASTHMA WITHOUT COMPLICATION: ICD-10-CM

## 2025-06-17 DIAGNOSIS — Z79.899 CHRONIC USE OF BENZODIAZEPINE FOR THERAPEUTIC PURPOSE: ICD-10-CM

## 2025-06-17 DIAGNOSIS — E80.4 GILBERT'S DISEASE: ICD-10-CM

## 2025-06-17 DIAGNOSIS — Z00.00 ENCOUNTER FOR MEDICARE ANNUAL WELLNESS EXAM: Primary | ICD-10-CM

## 2025-06-17 DIAGNOSIS — G47.33 OSA ON CPAP: ICD-10-CM

## 2025-06-17 PROCEDURE — 3075F SYST BP GE 130 - 139MM HG: CPT | Performed by: STUDENT IN AN ORGANIZED HEALTH CARE EDUCATION/TRAINING PROGRAM

## 2025-06-17 PROCEDURE — 3078F DIAST BP <80 MM HG: CPT | Performed by: STUDENT IN AN ORGANIZED HEALTH CARE EDUCATION/TRAINING PROGRAM

## 2025-06-17 PROCEDURE — 80307 DRUG TEST PRSMV CHEM ANLYZR: CPT

## 2025-06-17 PROCEDURE — G0439 PPPS, SUBSEQ VISIT: HCPCS | Performed by: STUDENT IN AN ORGANIZED HEALTH CARE EDUCATION/TRAINING PROGRAM

## 2025-06-17 RX ORDER — OXYBUTYNIN CHLORIDE 5 MG/1
5 TABLET, EXTENDED RELEASE ORAL
COMMUNITY

## 2025-06-17 RX ORDER — ALPRAZOLAM 0.25 MG
0.25 TABLET ORAL
Qty: 30 TABLET | Refills: 0 | Status: SHIPPED | OUTPATIENT
Start: 2025-06-17 | End: 2025-07-17

## 2025-06-17 ASSESSMENT — FIBROSIS 4 INDEX: FIB4 SCORE: 1.45

## 2025-06-17 ASSESSMENT — PATIENT HEALTH QUESTIONNAIRE - PHQ9: CLINICAL INTERPRETATION OF PHQ2 SCORE: 0

## 2025-06-17 ASSESSMENT — ENCOUNTER SYMPTOMS: GENERAL WELL-BEING: FAIR

## 2025-06-17 ASSESSMENT — ACTIVITIES OF DAILY LIVING (ADL): BATHING_REQUIRES_ASSISTANCE: 0

## 2025-06-17 NOTE — PROGRESS NOTES
Chief Complaint   Patient presents with    Annual Exam     wellness       HPI:  Kiley Foss is a 69 y.o. here for Medicare Annual Wellness Visit     Patient Active Problem List    Diagnosis Date Noted    Fibrocystic breast changes of both breasts 04/19/2024    Postural urinary incontinence 04/19/2024    Renal cyst 08/03/2022    Mixed hyperlipidemia 05/14/2021    Primary osteoarthritis of both knees 05/14/2021    Subclinical hyperthyroidism 03/30/2021    Microscopic hematuria 03/30/2021    Gilbert's disease 12/01/2020    EVE on CPAP 09/26/2020    Allergic rhinitis 10/30/2018    Fear of flying 02/21/2017    Morbid obesity with BMI of 40.0-44.9, adult ([facility]) 02/21/2017    Primary hypertension 07/10/2015    REINALDO (generalized anxiety disorder)     Mild intermittent asthma without complication     GERD (gastroesophageal reflux disease)     Multiple thyroid nodules        Current Medications[1]       Current supplements as per medication list.     Allergies: Amoxicillin-pot clavulanate, Augmentin, Cephalexin, Pcn [penicillins], Sulfa drugs, Diclofenac sodium-propylene glycol, Ibuprofen, Keflex, Penicillin g, and Latex    Current social contact/activities: She will be going camping and will be joining gatherings for family birthdays.    She  reports that she has never smoked. She has never used smokeless tobacco. She reports current alcohol use. She reports that she does not use drugs.  Counseling given: Not Answered      ROS:    Gait: Uses a cane  Ostomy: No  Other tubes: No  Amputations: No  Chronic oxygen use: No  Last eye exam: 2024  Wears hearing aids: No   : Reports urinary leakage during the last 6 months that has somewhat interfered with their daily activities or sleep.    Screening:    Depression Screening  Little interest or pleasure in doing things?  0 - not at all  Feeling down, depressed , or hopeless? 0 - not at all  Patient Health Questionnaire Score: 0     If depressive symptoms identified deferred  to follow up visit unless specifically addressed in assessment and plan.    Interpretation of PHQ-9 Total Score   Score Severity   1-4 No Depression   5-9 Mild Depression   10-14 Moderate Depression   15-19 Moderately Severe Depression   20-27 Severe Depression    Screening for Cognitive Impairment  Do you or any of your friends or family members have any concern about your memory? No  Three Minute Recall (Village, Kitchen, Baby) 3/3    Uriel clock face with all 12 numbers and set the hands to show 10 minutes past 11.  Yes    Cognitive concerns identified deferred for follow up unless specifically addressed in assessment and plan.    Fall Risk Assessment  Has the patient had two or more falls in the last year or any fall with injury in the last year?  No    Safety Assessment  Do you always wear your seatbelt?  Yes  Any changes to home needed to function safely? No  Difficulty hearing.  No  Patient counseled about all safety risks that were identified.    Functional Assessment ADLs  Are there any barriers preventing you from cooking for yourself or meeting nutritional needs?  No.    Are there any barriers preventing you from driving safely or obtaining transportation?  No.    Are there any barriers preventing you from using a telephone or calling for help?  No    Are there any barriers preventing you from shopping?  No.    Are there any barriers preventing you from taking care of your own finances?  No    Are there any barriers preventing you from managing your medications?  No    Are there any barriers preventing you from showering, bathing or dressing yourself? No    Are there any barriers preventing you from doing housework or laundry? No  Are there any barriers preventing you from using the toilet?No  Are you currently engaging in any exercise or physical activity?  No.      Self-Assessment of Health  What is your perception of your health? Fair    Do you sleep more than six hours a night? Yes    In the past 7  days, how much did pain keep you from doing your normal work? A lot    Do you spend quality time with family or friends (virtually or in person)? Yes    Do you usually eat a heart healthy diet that constists of a variety of fruits, vegetables, whole grains and fiber? Yes    Do you eat foods high in fat and/or Fast Food more than three times per week? Yes    How concerned are you that your medical conditions are not being well managed? a little    Are you worried that in the next 2 months, you may not have stable housing that you own, rent, or stay in as part of a household? No      Advance Care Planning  Do you have an Advance Directive, Living Will, Durable Power of , or POLST? Yes  Advance Directive Living Will Durable Power of    is not on file - instructed patient to bring in a copy to scan into their chart      Health Maintenance Summary            Current Care Gaps       COVID-19 Vaccine (7 - 2024-25 season) Overdue since 3/16/2025      09/16/2024  Imm Admin: Covid-19 Mrna (Spikevax) Moderna 12+ Years    10/09/2023  Imm Admin: Covid-19 Mrna (Spikevax) Moderna 12+ Years    10/12/2022  Imm Admin: MODERNA BIVALENT BOOSTER SARS-COV-2 VACCINE (6+)    06/15/2022  Imm Admin: MODERNA SARS-COV-2 VACCINE (12+)    11/20/2021  Imm Admin: MODERNA SARS-COV-2 VACCINE (12+)     Only the first 5 history entries have been loaded, but more history exists.            Mammogram (Yearly) Due soon on 7/2/2025 07/02/2024  MA-SCREENING MAMMO BILAT W/TOMOSYNTHESIS W/CAD    02/08/2022  MA-SCREENING MAMMO BILAT W/TOMOSYNTHESIS W/CAD    10/12/2020  MA-SCREENING MAMMO BILAT W/TOMOSYNTHESIS W/CAD    09/24/2019  MA-SCREENING MAMMO BILAT W/TOMOSYNTHESIS W/CAD    06/15/2018  MA-MAMMO SCREENING BILAT W/MOISE W/CAD      Only the first 5 history entries have been loaded, but more history exists.                      Upcoming       Colorectal Cancer Screening (Colonoscopy - Every 5 Years) Next due on 3/25/2026      03/25/2021   Colonoscopy (Done)    11/12/2015  AMB REFERRAL TO GI FOR COLONOSCOPY              Annual Wellness Visit (Yearly) Next due on 6/17/2026 06/17/2025  Visit Dx: Encounter for Medicare annual wellness exam    06/21/2024  Subsequent Annual Wellness Visit - Includes PPPS ()    06/20/2024  Visit Dx: Encounter for Medicare annual wellness exam    12/11/2022  Initial Annual Wellness Visit - Includes PPPS ()    12/09/2022  Visit Dx: Encounter for Medicare annual wellness exam      Only the first 5 history entries have been loaded, but more history exists.              Bone Density Scan (Every 5 Years) Next due on 4/6/2028 04/06/2023  DS-BONE DENSITY STUDY (DEXA)              IMM DTaP/Tdap/Td Vaccine (2 - Td or Tdap) Next due on 7/31/2028 07/31/2018  Imm Admin: Tdap Vaccine                      Completed or No Longer Recommended       Influenza Vaccine (Series Information) Completed      09/23/2024  Imm Admin: Influenza high-dose trivalent (PF)    09/22/2023  Imm Admin: Influenza Vaccine Adult HD    09/28/2022  Imm Admin: Influenza Vaccine Quad Inj (Pf)    10/10/2021  Imm Admin: Influenza Vaccine Quad Inj (Pf)    09/22/2020  Imm Admin: Influenza Vaccine Quad Recombinant      Only the first 5 history entries have been loaded, but more history exists.              Zoster (Shingles) Vaccines (Series Information) Completed      10/17/2018  Imm Admin: Zoster Vaccine Recombinant (RZV) (SHINGRIX)    07/31/2018  Imm Admin: Zoster Vaccine Recombinant (RZV) (SHINGRIX)              Hepatitis C Screening  Completed      01/11/2020  Hepatitis C Antibody component of HEP C VIRUS ANTIBODY              Pneumococcal Vaccine: 50+ Years (Series Information) Completed      02/02/2023  Imm Admin: Pneumococcal polysaccharide vaccine (PPSV-23)    10/17/2012  Imm Admin: Pneumococcal Conjugate Vaccine (Prevnar/PCV-13)    10/17/2012  Imm Admin: Pneumococcal polysaccharide vaccine (PPSV-23)              Hepatitis A Vaccine (Hep  A) (Series Information) Aged Out      No completion history exists for this topic.              HPV Vaccines (Series Information) Aged Out     No completion history exists for this topic.              Polio Vaccine (Inactivated Polio) (Series Information) Aged Out     No completion history exists for this topic.              Meningococcal Immunization (Series Information) Aged Out     No completion history exists for this topic.              Meningococcal B Vaccine (Series Information) Aged Out     No completion history exists for this topic.              Cervical Cancer Screening  Discontinued        Frequency changed to Never automatically (Topic No Longer Applies)    08/06/2020  PAP IG (IMAGE GUIDED)    01/05/2017  THINPREP PAP WITH HPV    08/14/2012  PAP IG (IMAGE GUIDED)              Hepatitis B Vaccine (Hep B)  Discontinued     No completion history exists for this topic.                            Patient Care Team:  Jillian Angulo M.D. as PCP - General (Family Medicine)  Arnulfo Lind M.D. as Consulting Physician (Orthopedic Surgery)  Mitzi Collins M.D. as Consulting Physician (Endocrinology)  Kelley Buck M.D. as Consulting Physician (Surgery)  Vitor Magallon M.D. (Inactive) as Consulting Physician (OB & Gyn - Gynecology)  Robyn Quinones M.D. (Otolaryngology)  Paul Monson M.D. (Cardiovascular Disease (Cardiology))  Felipe St M.D. (Gastroenterology)  Balta Burnett M.D. (Urology)  [facility] as Respiratory Therapist (DME Supplier)        Social History[2]  Family History   Problem Relation Age of Onset    Hypertension Mother     Diabetes Mother     Dementia Father     Cancer Father         Non Hodgekin's Lymphoma    Cancer Sister         cervical    Cancer Brother         skin    Breast Cancer Maternal Aunt     Breast Cancer Paternal Aunt     Heart Disease Maternal Grandfather     Stroke Maternal Grandfather     Heart Disease Paternal Grandfather     Stroke Paternal Grandfather      "Colorectal Cancer Neg Hx      She  has a past medical history of Anxiety, Arrhythmia (11/2019), Arthritis (01/21/2020), ASTHMA, Chronic pain of both knees (06/20/2018), Daytime sleepiness, Dyslipidemia, Dysuria (05/27/2020), Edema extremities (05/30/2018), Gasping for breath, GERD (gastroesophageal reflux disease), Heart burn (01/21/2020), Heart palpitations (08/14/2019), Hemorrhage, Hemorrhoids (11/12/2015), Hypertension, Infectious disease (01/21/2020), Insomnia, Kidney stone (08/03/2022), Low serum vitamin D (09/19/2017), Multiple thyroid nodules (01/21/2020), Non-seasonal allergic rhinitis, Non-smoker (09/28/2020), Obesity -- 43 (07/27/2011), Pain (01/21/2020), RAD (reactive airway disease) (07/01/2011), Rash (05/14/2021), Simple phobia, Sinus infection (01/21/2020), Sleep apnea (01/21/2020), Snoring, Urinary incontinence (01/21/2020), and Varicose vein of leg (07/10/2015).    She has no past medical history of Breast cancer (HCC) or Morning headache.   Past Surgical History[3]    Exam:   /62   Pulse 70   Temp 37.1 °C (98.7 °F) (Temporal)   Ht 1.626 m (5' 4\")   Wt 115 kg (254 lb)   SpO2 97%  Body mass index is 43.6 kg/m².    Hearing good.    Dentition good  Alert, oriented in no acute distress.  Eye contact is good, speech goal directed, affect calm    Assessment and Plan. The following treatment and monitoring plan is recommended:      1. Encounter for Medicare annual wellness exam (Primary)  Medicare AWV done today.  Patient is healthy with no major concerns.  Patient is able to perform ADLs without difficulty.  Follows up with dentist and ophthalmology regularly. Patient denies falls and was able to get up out of her the and ambulate without difficulty.     2. Primary hypertension  Chronic, controlled.  Continue amlodipine 5 mg daily and chlorothiazide 25 mg daily.    3. Mixed hyperlipidemia  Chronic, stable.    4. REINALDO (generalized anxiety disorder)  Chronic, stable.  Patient is requesting refill " for Xanax 0.25 mg once daily as needed for anxiety.  UDS collected and CSA agreement signed.  PDMP reviewed.  - Controlled Substance Treatment Agreement  - ALPRAZolam (XANAX) 0.25 MG Tab; Take 1 Tablet by mouth 1 time a day as needed for Anxiety for up to 30 days.  Dispense: 30 Tablet; Refill: 0  - URINE DRUG SCREEN W/CONF (SEND OUT); Future    5. Gilbert's disease  Chronic, stable.    6. Primary osteoarthritis of both knees  Chronic, stable.    7. Postural urinary incontinence  Chronic, stable.  Patient is established with urology.  She has recently started on oxybutynin 5 mg daily.    8. Mild intermittent asthma without complication  Chronic, stable.  Continue albuterol inhaler as needed.    9. Morbid obesity with BMI of 40.0-44.9, adult (HCC)  Chronic, stable.  Healthy lifestyle modifications recommended.    10. EVE on CPAP  Chronic, stable.       Services suggested: No services needed at this time  Health Care Screening: Age-appropriate preventive services recommended by USPTF and ACIP covered by Medicare were discussed today. Services ordered if indicated and agreed upon by the patient.  Referrals offered: Community-based lifestyle interventions to reduce health risks and promote self-management and wellness, fall prevention, nutrition, physical activity, tobacco-use cessation, weight loss, and mental health services as per orders if indicated.    Discussion today about general wellness and lifestyle habits:    Prevent falls and reduce trip hazards; Cautioned about securing or removing rugs.  Have a working fire alarm and carbon monoxide detector;   Engage in regular physical activity and social activities     Follow-up: Return if symptoms worsen or fail to improve.         [1]   Current Outpatient Medications   Medication Sig Dispense Refill    ALPRAZolam (XANAX) 0.25 MG Tab Take 1 Tablet by mouth 1 time a day as needed for Anxiety for up to 30 days. 30 Tablet 0    amLODIPine (NORVASC) 5 MG Tab Take 1 Tablet  by mouth every day. 100 Tablet 3    albuterol (PROVENTIL) 2.5mg/3ml Nebu Soln solution for nebulization Take 3 mL by nebulization every four hours as needed for Shortness of Breath. 75 mL 0    VENTOLIN  (90 Base) MCG/ACT Aero Soln inhalation aerosol Inhale 2 Puffs every 6 hours as needed for Shortness of Breath. 18 g 1    sertraline (ZOLOFT) 50 MG Tab TAKE 1 TABLET BY MOUTH EVERY DAY 90 Tablet 3    hydroCHLOROthiazide 25 MG Tab TAKE 1 TABLET BY MOUTH EVERY DAY 90 Tablet 3    meloxicam (MOBIC) 7.5 MG Tab TAKE 1 TABLET BY MOUTH EVERY DAY 90 Tablet 3    cycloSPORINE (RESTASIS) 0.05 % ophthalmic emulsion 1 drop into affected eye Ophthalmic Twice a day      azelastine (ASTELIN) 137 MCG/SPRAY nasal spray 2 sprays in each nostril Nasally Twice a day for 30 day(s)      cetirizine (ZYRTEC) 10 MG Tab Zyrtec 10 mg tablet   1 tablet every day by oral route.      fluticasone (FLONASE) 50 MCG/ACT nasal spray fluticasone propionate 50 mcg/actuation nasal spray,suspension   2 sprays every day by nasal route.      methimazole (TAPAZOLE) 5 MG Tab Just 1/2 tablet Tuesday, Thursday Saturday, Sunday      Multiple Vitamins-Minerals (WOMENS MULTI VITAMIN & MINERAL PO) Take  by mouth every day.      oxybutynin SR (DITROPAN-XL) 5 MG TABLET SR 24 HR Take 5 mg by mouth every day.       No current facility-administered medications for this visit.   [2]   Social History  Tobacco Use    Smoking status: Never    Smokeless tobacco: Never   Vaping Use    Vaping status: Never Used   Substance Use Topics    Alcohol use: Yes     Comment: twice a month    Drug use: No   [3]   Past Surgical History:  Procedure Laterality Date    BIOPSY GENERAL Bilateral 01/08/2020    moles removed back of left arm and right shoulder    COLONOSCOPY N/A 2016    Polyps and on 5 year plan for testing    BIOPSY GENERAL      thyroid     DENTAL EXTRACTION(S) N/A 1980's    3 extracted over a period of time    SALPINGECTOMY      ectopic pregnancy    SINUSOTOMIES       TONSILLECTOMY      TUBAL COAGULATION LAPAROSCOPIC BILATERAL

## 2025-06-25 DIAGNOSIS — Z79.899 CHRONIC USE OF BENZODIAZEPINE FOR THERAPEUTIC PURPOSE: ICD-10-CM

## 2025-06-26 LAB
AMPHET CTO UR CFM-MCNC: NEGATIVE NG/ML
BARBITURATES CTO UR CFM-MCNC: NEGATIVE NG/ML
BENZODIAZ CTO UR CFM-MCNC: NEGATIVE NG/ML
CANNABINOIDS CTO UR CFM-MCNC: NEGATIVE NG/ML
COCAINE CTO UR CFM-MCNC: NEGATIVE NG/ML
CREAT UR-MCNC: 66.7 MG/DL (ref 20–400)
DRUG COMMENT 753798: NORMAL
METHADONE CTO UR CFM-MCNC: NEGATIVE NG/ML
OPIATES CTO UR CFM-MCNC: NEGATIVE NG/ML
PCP CTO UR CFM-MCNC: NEGATIVE NG/ML
PROPOXYPH CTO UR CFM-MCNC: NEGATIVE NG/ML

## 2025-07-02 ENCOUNTER — APPOINTMENT (OUTPATIENT)
Dept: LAB | Facility: MEDICAL CENTER | Age: 70
End: 2025-07-02
Payer: MEDICARE

## 2025-07-02 LAB
T3FREE SERPL-MCNC: 2.86 PG/ML (ref 2–4.4)
T4 FREE SERPL-MCNC: 1.23 NG/DL (ref 0.93–1.7)
TSH SERPL-ACNC: 1.08 UIU/ML (ref 0.38–5.33)

## 2025-07-02 PROCEDURE — 84481 FREE ASSAY (FT-3): CPT

## 2025-07-02 PROCEDURE — 36415 COLL VENOUS BLD VENIPUNCTURE: CPT

## 2025-07-02 PROCEDURE — 84443 ASSAY THYROID STIM HORMONE: CPT

## 2025-07-02 PROCEDURE — 84439 ASSAY OF FREE THYROXINE: CPT

## 2025-07-16 DIAGNOSIS — I10 ESSENTIAL HYPERTENSION: ICD-10-CM

## 2025-07-16 NOTE — TELEPHONE ENCOUNTER
Received request via: Pharmacy    Was the patient seen in the last year in this department? Yes    Does the patient have an active prescription (recently filled or refills available) for medication(s) requested? No    Pharmacy Name: [id number] - [place], [place] - [address] AT [facility] [address]     Does the patient have care home Plus and need 100-day supply? (This applies to ALL medications) Patient does not have SCP

## 2025-07-17 DIAGNOSIS — I10 ESSENTIAL HYPERTENSION: ICD-10-CM

## 2025-07-18 RX ORDER — HYDROCHLOROTHIAZIDE 25 MG/1
25 TABLET ORAL
Qty: 90 TABLET | Refills: 0 | OUTPATIENT
Start: 2025-07-18

## 2025-07-18 RX ORDER — MELOXICAM 7.5 MG/1
7.5 TABLET ORAL DAILY
Qty: 90 TABLET | Refills: 0 | OUTPATIENT
Start: 2025-07-18

## 2025-07-18 RX ORDER — HYDROCHLOROTHIAZIDE 25 MG/1
25 TABLET ORAL
Qty: 90 TABLET | Refills: 3 | Status: SHIPPED | OUTPATIENT
Start: 2025-07-18

## 2025-07-18 RX ORDER — MELOXICAM 7.5 MG/1
7.5 TABLET ORAL DAILY
Qty: 90 TABLET | Refills: 3 | Status: SHIPPED | OUTPATIENT
Start: 2025-07-18

## 2025-08-20 ENCOUNTER — APPOINTMENT (OUTPATIENT)
Dept: LAB | Facility: MEDICAL CENTER | Age: 70
End: 2025-08-20
Payer: MEDICARE